# Patient Record
Sex: FEMALE | Race: BLACK OR AFRICAN AMERICAN | Employment: OTHER | URBAN - METROPOLITAN AREA
[De-identification: names, ages, dates, MRNs, and addresses within clinical notes are randomized per-mention and may not be internally consistent; named-entity substitution may affect disease eponyms.]

---

## 2018-04-05 ENCOUNTER — HOSPITAL ENCOUNTER (OUTPATIENT)
Age: 68
Discharge: HOME | End: 2018-04-05
Payer: MEDICARE

## 2018-04-05 LAB
ALBUMIN SERPL-MCNC: 4.5 G/DL (ref 3.5–5.2)
ALP BLD-CCNC: 60 U/L (ref 35–104)
ALT SERPL-CCNC: 31 U/L (ref 0–32)
ANION GAP SERPL CALCULATED.3IONS-SCNC: 15 MMOL/L (ref 7–16)
AST SERPL-CCNC: 24 U/L (ref 0–31)
BILIRUB SERPL-MCNC: 0.7 MG/DL (ref 0–1.2)
BUN BLDV-MCNC: 16 MG/DL (ref 8–23)
CALCIUM SERPL-MCNC: 9.7 MG/DL (ref 8.6–10.2)
CHLORIDE BLD-SCNC: 98 MMOL/L (ref 98–107)
CHOLESTEROL, FASTING: 211 MG/DL (ref 0–199)
CO2: 24 MMOL/L (ref 22–29)
CREAT SERPL-MCNC: 1 MG/DL (ref 0.5–1)
GFR AFRICAN AMERICAN: >60
GFR NON-AFRICAN AMERICAN: >60 ML/MIN/1.73
GLUCOSE FASTING: 82 MG/DL (ref 74–109)
HCT VFR BLD CALC: 41.6 % (ref 34–48)
HDLC SERPL-MCNC: 62 MG/DL
HEMOGLOBIN: 14 G/DL (ref 11.5–15.5)
LDL CHOLESTEROL CALCULATED: 121 MG/DL (ref 0–99)
MCH RBC QN AUTO: 31.2 PG (ref 26–35)
MCHC RBC AUTO-ENTMCNC: 33.7 % (ref 32–34.5)
MCV RBC AUTO: 92.7 FL (ref 80–99.9)
PDW BLD-RTO: 12.1 FL (ref 11.5–15)
PLATELET # BLD: 308 E9/L (ref 130–450)
PMV BLD AUTO: 10.9 FL (ref 7–12)
POTASSIUM SERPL-SCNC: 3.5 MMOL/L (ref 3.5–5)
RBC # BLD: 4.49 E12/L (ref 3.5–5.5)
SODIUM BLD-SCNC: 137 MMOL/L (ref 132–146)
TOTAL PROTEIN: 8.6 G/DL (ref 6.4–8.3)
TRIGLYCERIDE, FASTING: 140 MG/DL (ref 0–149)
TSH SERPL DL<=0.05 MIU/L-ACNC: 1.25 UIU/ML (ref 0.27–4.2)
VITAMIN D 25-HYDROXY: 53 NG/ML (ref 30–100)
VLDLC SERPL CALC-MCNC: 28 MG/DL
WBC # BLD: 8.4 E9/L (ref 4.5–11.5)

## 2018-04-05 PROCEDURE — 80053 COMPREHEN METABOLIC PANEL: CPT

## 2018-04-05 PROCEDURE — 84443 ASSAY THYROID STIM HORMONE: CPT

## 2018-04-05 PROCEDURE — 82306 VITAMIN D 25 HYDROXY: CPT

## 2018-04-05 PROCEDURE — 85027 COMPLETE CBC AUTOMATED: CPT

## 2018-04-05 PROCEDURE — 80061 LIPID PANEL: CPT

## 2018-04-05 PROCEDURE — 36415 COLL VENOUS BLD VENIPUNCTURE: CPT

## 2019-12-20 ENCOUNTER — HOSPITAL ENCOUNTER (OUTPATIENT)
Dept: MAMMOGRAPHY | Age: 69
Discharge: HOME | End: 2019-12-22
Payer: MEDICARE

## 2019-12-20 DIAGNOSIS — Z13.820 ENCOUNTER FOR SCREENING FOR OSTEOPOROSIS: ICD-10-CM

## 2019-12-20 PROCEDURE — 77080 DXA BONE DENSITY AXIAL: CPT

## 2021-12-22 ENCOUNTER — HOSPITAL ENCOUNTER (OUTPATIENT)
Dept: MAMMOGRAPHY | Age: 71
Discharge: HOME | End: 2021-12-24
Payer: MEDICARE

## 2021-12-22 DIAGNOSIS — M81.0 AGE-RELATED OSTEOPOROSIS WITHOUT CURRENT PATHOLOGICAL FRACTURE: ICD-10-CM

## 2021-12-22 PROCEDURE — 77080 DXA BONE DENSITY AXIAL: CPT

## 2022-05-08 ENCOUNTER — HOSPITAL ENCOUNTER (EMERGENCY)
Age: 72
Discharge: HOME | End: 2022-05-08
Attending: EMERGENCY MEDICINE
Payer: COMMERCIAL

## 2022-05-08 ENCOUNTER — APPOINTMENT (OUTPATIENT)
Dept: GENERAL RADIOLOGY | Age: 72
End: 2022-05-08
Payer: COMMERCIAL

## 2022-05-08 VITALS
WEIGHT: 226 LBS | BODY MASS INDEX: 41.59 KG/M2 | HEIGHT: 62 IN | DIASTOLIC BLOOD PRESSURE: 76 MMHG | HEART RATE: 86 BPM | RESPIRATION RATE: 16 BRPM | OXYGEN SATURATION: 99 % | SYSTOLIC BLOOD PRESSURE: 138 MMHG | TEMPERATURE: 97.1 F
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
## 2022-05-08 DIAGNOSIS — S33.5XXA LUMBAR SPRAIN, INITIAL ENCOUNTER: Primary | ICD-10-CM

## 2022-05-08 PROCEDURE — 72100 X-RAY EXAM L-S SPINE 2/3 VWS: CPT

## 2022-05-08 PROCEDURE — 99283 EMERGENCY DEPT VISIT LOW MDM: CPT

## 2022-05-08 RX ORDER — TRIAMTERENE AND HYDROCHLOROTHIAZIDE 37.5; 25 MG/1; MG/1
1 CAPSULE ORAL EVERY MORNING
COMMUNITY

## 2022-05-08 RX ORDER — AMLODIPINE BESYLATE 10 MG/1
10 TABLET ORAL DAILY
COMMUNITY

## 2022-05-08 RX ORDER — CYCLOBENZAPRINE HCL 10 MG
10 TABLET ORAL 3 TIMES DAILY PRN
Qty: 15 TABLET | Refills: 0 | Status: SHIPPED | OUTPATIENT
Start: 2022-05-08 | End: 2022-05-13

## 2022-05-08 RX ORDER — IBUPROFEN 400 MG/1
400 TABLET ORAL EVERY 8 HOURS PRN
Qty: 30 TABLET | Refills: 0 | Status: SHIPPED | OUTPATIENT
Start: 2022-05-08 | End: 2022-07-26

## 2022-05-08 RX ORDER — POTASSIUM CHLORIDE 750 MG/1
10 CAPSULE, EXTENDED RELEASE ORAL 2 TIMES DAILY
COMMUNITY

## 2022-05-08 RX ORDER — PHENOL 1.4 %
1 AEROSOL, SPRAY (ML) MUCOUS MEMBRANE 2 TIMES DAILY PRN
COMMUNITY

## 2022-05-08 ASSESSMENT — ENCOUNTER SYMPTOMS
EYE DISCHARGE: 0
EYE REDNESS: 0
SHORTNESS OF BREATH: 0
VOMITING: 0
ABDOMINAL PAIN: 0
ABDOMINAL DISTENTION: 0
COUGH: 0
EYE PAIN: 0
WHEEZING: 0
BACK PAIN: 0
SORE THROAT: 0
DIARRHEA: 0
BLOOD IN STOOL: 0
SINUS PRESSURE: 0
NAUSEA: 0

## 2022-05-08 ASSESSMENT — PAIN DESCRIPTION - LOCATION: LOCATION: ABDOMEN;BACK

## 2022-05-08 ASSESSMENT — PAIN DESCRIPTION - ORIENTATION: ORIENTATION: LOWER

## 2022-05-08 ASSESSMENT — PAIN DESCRIPTION - PAIN TYPE: TYPE: ACUTE PAIN

## 2022-05-08 ASSESSMENT — PAIN DESCRIPTION - DESCRIPTORS: DESCRIPTORS: SORE

## 2022-05-08 ASSESSMENT — PAIN SCALES - GENERAL: PAINLEVEL_OUTOF10: 7

## 2022-05-08 ASSESSMENT — PAIN DESCRIPTION - FREQUENCY: FREQUENCY: CONTINUOUS

## 2022-05-08 ASSESSMENT — PAIN - FUNCTIONAL ASSESSMENT: PAIN_FUNCTIONAL_ASSESSMENT: 0-10
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
## 2022-07-19 ENCOUNTER — APPOINTMENT (OUTPATIENT)
Dept: GENERAL RADIOLOGY | Age: 72
DRG: 494 | End: 2022-07-19
Payer: MEDICARE

## 2022-07-19 ENCOUNTER — HOSPITAL ENCOUNTER (INPATIENT)
Age: 72
LOS: 1 days | Discharge: NURSING, REHAB OR LONG-TERM CARE | DRG: 494 | End: 2022-07-21
Attending: EMERGENCY MEDICINE | Admitting: INTERNAL MEDICINE
Payer: MEDICARE

## 2022-07-19 DIAGNOSIS — Z01.811 PRE-OP CHEST EXAM: ICD-10-CM

## 2022-07-19 DIAGNOSIS — S82.892A CLOSED FRACTURE OF LEFT ANKLE, INITIAL ENCOUNTER: ICD-10-CM

## 2022-07-19 DIAGNOSIS — S82.842A CLOSED BIMALLEOLAR FRACTURE OF LEFT ANKLE, INITIAL ENCOUNTER: Primary | ICD-10-CM

## 2022-07-19 DIAGNOSIS — S82.842A: ICD-10-CM

## 2022-07-19 PROCEDURE — 2500000003 HC RX 250 WO HCPCS: Performed by: STUDENT IN AN ORGANIZED HEALTH CARE EDUCATION/TRAINING PROGRAM

## 2022-07-19 PROCEDURE — 6360000002 HC RX W HCPCS

## 2022-07-19 PROCEDURE — 99285 EMERGENCY DEPT VISIT HI MDM: CPT

## 2022-07-19 PROCEDURE — 73600 X-RAY EXAM OF ANKLE: CPT

## 2022-07-19 PROCEDURE — 6360000002 HC RX W HCPCS: Performed by: EMERGENCY MEDICINE

## 2022-07-19 PROCEDURE — 73590 X-RAY EXAM OF LOWER LEG: CPT

## 2022-07-19 PROCEDURE — 6370000000 HC RX 637 (ALT 250 FOR IP): Performed by: STUDENT IN AN ORGANIZED HEALTH CARE EDUCATION/TRAINING PROGRAM

## 2022-07-19 PROCEDURE — 2500000003 HC RX 250 WO HCPCS

## 2022-07-19 PROCEDURE — 73610 X-RAY EXAM OF ANKLE: CPT

## 2022-07-19 PROCEDURE — 96375 TX/PRO/DX INJ NEW DRUG ADDON: CPT

## 2022-07-19 PROCEDURE — 96374 THER/PROPH/DIAG INJ IV PUSH: CPT

## 2022-07-19 PROCEDURE — 73630 X-RAY EXAM OF FOOT: CPT

## 2022-07-19 PROCEDURE — 96376 TX/PRO/DX INJ SAME DRUG ADON: CPT

## 2022-07-19 PROCEDURE — 6360000002 HC RX W HCPCS: Performed by: STUDENT IN AN ORGANIZED HEALTH CARE EDUCATION/TRAINING PROGRAM

## 2022-07-19 RX ORDER — ONDANSETRON 2 MG/ML
4 INJECTION INTRAMUSCULAR; INTRAVENOUS EVERY 6 HOURS PRN
Status: DISCONTINUED | OUTPATIENT
Start: 2022-07-19 | End: 2022-07-22 | Stop reason: HOSPADM

## 2022-07-19 RX ORDER — LIDOCAINE HYDROCHLORIDE AND EPINEPHRINE 10; 10 MG/ML; UG/ML
20 INJECTION, SOLUTION INFILTRATION; PERINEURAL ONCE
Status: COMPLETED | OUTPATIENT
Start: 2022-07-19 | End: 2022-07-19

## 2022-07-19 RX ORDER — PROPOFOL 10 MG/ML
INJECTION, EMULSION INTRAVENOUS
Status: COMPLETED
Start: 2022-07-19 | End: 2022-07-19

## 2022-07-19 RX ORDER — FENTANYL CITRATE 0.05 MG/ML
50 INJECTION, SOLUTION INTRAMUSCULAR; INTRAVENOUS ONCE
Status: COMPLETED | OUTPATIENT
Start: 2022-07-19 | End: 2022-07-19

## 2022-07-19 RX ORDER — KETAMINE HYDROCHLORIDE 10 MG/ML
INJECTION, SOLUTION INTRAMUSCULAR; INTRAVENOUS
Status: COMPLETED
Start: 2022-07-19 | End: 2022-07-19

## 2022-07-19 RX ORDER — ACETAMINOPHEN 500 MG
1000 TABLET ORAL ONCE
Status: COMPLETED | OUTPATIENT
Start: 2022-07-19 | End: 2022-07-19

## 2022-07-19 RX ORDER — PROPOFOL 10 MG/ML
0.5 INJECTION, EMULSION INTRAVENOUS ONCE
Status: COMPLETED | OUTPATIENT
Start: 2022-07-19 | End: 2022-07-19

## 2022-07-19 RX ORDER — KETAMINE HYDROCHLORIDE 10 MG/ML
0.5 INJECTION, SOLUTION INTRAMUSCULAR; INTRAVENOUS ONCE
Status: COMPLETED | OUTPATIENT
Start: 2022-07-19 | End: 2022-07-19

## 2022-07-19 RX ADMIN — ONDANSETRON 4 MG: 2 INJECTION INTRAMUSCULAR; INTRAVENOUS at 19:00

## 2022-07-19 RX ADMIN — PROPOFOL 51 MG: 10 INJECTION, EMULSION INTRAVENOUS at 23:31

## 2022-07-19 RX ADMIN — ONDANSETRON 4 MG: 2 INJECTION INTRAMUSCULAR; INTRAVENOUS at 20:15

## 2022-07-19 RX ADMIN — FENTANYL CITRATE 50 MCG: 50 INJECTION INTRAMUSCULAR; INTRAVENOUS at 20:05

## 2022-07-19 RX ADMIN — LIDOCAINE HYDROCHLORIDE,EPINEPHRINE BITARTRATE 20 ML: 10; .01 INJECTION, SOLUTION INFILTRATION; PERINEURAL at 23:20

## 2022-07-19 RX ADMIN — KETAMINE HYDROCHLORIDE 51.1 MG: 10 INJECTION, SOLUTION INTRAMUSCULAR; INTRAVENOUS at 23:31

## 2022-07-19 RX ADMIN — ACETAMINOPHEN 1000 MG: 500 TABLET ORAL at 22:13

## 2022-07-19 ASSESSMENT — PAIN SCALES - GENERAL
PAINLEVEL_OUTOF10: 10
PAINLEVEL_OUTOF10: 10
PAINLEVEL_OUTOF10: 0

## 2022-07-19 ASSESSMENT — PAIN - FUNCTIONAL ASSESSMENT
PAIN_FUNCTIONAL_ASSESSMENT: NONE - DENIES PAIN

## 2022-07-19 NOTE — ED PROVIDER NOTES
79-year-old female presenting for leg injury. Injury happened prior to arrival.  Patient was on the porch when she slipped and fell onto her left leg. She not hit her head or pass out. She does not lose consciousness and is not on any blood thinners. She complains of pain in her left ankle. She denies any other injuries. Of note, patient was given Ketamine by EMS and had nausea and emesis subsequently. Review of Systems   Respiratory:  Negative for cough. Cardiovascular:  Negative for chest pain. Gastrointestinal:  Positive for nausea and vomiting. Negative for diarrhea. Musculoskeletal:  Positive for arthralgias. Negative for back pain and neck pain. Left ankle pain   Skin:  Negative for wound. Neurological:  Negative for dizziness, syncope, light-headedness, numbness and headaches. All other systems reviewed and are negative. Physical Exam  Constitutional:       General: She is not in acute distress. Appearance: She is not ill-appearing. HENT:      Head: Normocephalic and atraumatic. Right Ear: External ear normal.      Left Ear: External ear normal.      Nose: Nose normal.   Eyes:      Conjunctiva/sclera: Conjunctivae normal.   Cardiovascular:      Rate and Rhythm: Normal rate and regular rhythm. Comments: DP pulse strong LLE; PT pulse detectable LLE  Pulmonary:      Effort: Pulmonary effort is normal.      Breath sounds: Normal breath sounds. Abdominal:      General: There is no distension. Palpations: Abdomen is soft. Tenderness: There is no abdominal tenderness. Musculoskeletal:      Comments: Obvious deformity left ankle, no wound or overlying skin changes, tender to palpation, ROM limited   Lymphadenopathy:      Cervical: No cervical adenopathy. Skin:     General: Skin is warm and dry. Neurological:      General: No focal deficit present. Mental Status: She is alert.       Comments: Sensation to touch intact LLE in all nerve distributions   Psychiatric:         Mood and Affect: Mood normal.         Behavior: Behavior normal.        Procedures   Conscious Sedation Procedure Note    Indication: ankle dislocation    Consent: I have discussed with the patient and/or the patient representative the indication, alternatives, and the possible risks and/or complications of the planned procedure and the anesthesia methods. The patient and/or patient representative appear to understand and agree to proceed. Physician Involvement: The attending physician was present and supervising this procedure. Pre-Sedation Documentation and Exam:  Time: 0265  I have personally completed a history, physical exam & review of systems for this patient (see notes). Vital signs have been reviewed (see flow sheet for vitals). I have reviewed the patient's history and review of systems. Lungs: clear to auscultation bilaterally, Cardiovascular: regular rate and rhythm. Airway Assessment: Mallampati Class I - (soft palate, fauces, uvula & anterior/posterior tonsillar pillars are visible)    Prior History of Anesthesia Complications: vomiting with anesthesia administration    ASA Classification: Class 2 - A normal healthy patient with mild systemic disease    Sedation/ Anesthesia Plan: intravenous sedation    Medications Used: ketamine intravenously and propofol intravenously    Monitoring and Safety: The patient was placed on a cardiac monitor and vital signs, pulse oximetry and level of consciousness were continuously evaluated throughout the procedure. The patient was closely monitored until recovery from the medications was complete and the patient had returned to baseline status. Respiratory therapy was on standby at all times during the procedure. (The following sections must be completed)  Post-Sedation Vital Signs: Vital signs were reviewed and were stable after the procedure (see flow sheet for vitals)            Post-Sedation Exam:   Time: 4081. Lungs: clear to auscultation bilaterally and Cardiovascular: regular rate and rhythm           Complications: none        MDM  Number of Diagnoses or Management Options  Closed bimalleolar fracture of left ankle, initial encounter  Diagnosis management comments: 71-year-old female presenting for left ankle pain. X-ray showed bimalleolar fracture with subluxation. Orthopedic surgery was consulted. Patient underwent conscious sedation with closed reduction of ankle joint in the ED. patient neurovascular intact before and after procedure. Patient to be admitted with plans for surgery tomorrow. ED Course as of 07/20/22 0031 Tue Jul 19, 2022 2337 I Supervised the procedural sedation by the resident which was done well with no complications. [GJ]      ED Course User Index  [GJ] Odalis Cotter MD           ED Course as of 07/20/22 0031 Tue Jul 19, 2022 2337 I Supervised the procedural sedation by the resident which was done well with no complications. [GJ]      ED Course User Index  [GJ] Odalis Cotter MD       --------------------------------------------- PAST HISTORY ---------------------------------------------  Past Medical History:  has a past medical history of Hypertension. Past Surgical History:  has a past surgical history that includes Hysterectomy. Social History:  reports that she has never smoked. She has never used smokeless tobacco. She reports that she does not currently use alcohol. She reports that she does not use drugs. Family History: family history is not on file. The patients home medications have been reviewed. Allergies: Codeine    -------------------------------------------------- RESULTS -------------------------------------------------    LABS:  No results found for this visit on 07/19/22. RADIOLOGY:  XR TIBIA FIBULA LEFT (2 VIEWS)   Final Result   1. Fractures of the distal tibia and fibula with lateral subluxation of the   ankle joint.    2. The remainder of the tibia and fibula as well as left foot. 3. Suboptimal visualization of the ankle joint due to deformity. Attention   recommended the the on post reduction radiographs. XR ANKLE LEFT (2 VIEWS)   Final Result   1. Fractures of the distal tibia and fibula with lateral subluxation of the   ankle joint. 2. The remainder of the tibia and fibula as well as left foot. 3. Suboptimal visualization of the ankle joint due to deformity. Attention   recommended the the on post reduction radiographs. XR FOOT LEFT (MIN 3 VIEWS)   Final Result   1. Fractures of the distal tibia and fibula with lateral subluxation of the   ankle joint. 2. The remainder of the tibia and fibula as well as left foot. 3. Suboptimal visualization of the ankle joint due to deformity. Attention   recommended the the on post reduction radiographs. XR ANKLE LEFT (MIN 3 VIEWS)    (Results Pending)       ------------------------- NURSING NOTES AND VITALS REVIEWED ---------------------------  Date / Time Roomed:  7/19/2022  6:51 PM  ED Bed Assignment:  08/08    The nursing notes within the ED encounter and vital signs as below have been reviewed.      Patient Vitals for the past 24 hrs:   BP Temp Temp src Pulse Resp SpO2 Height Weight   07/19/22 2348 (!) 172/69 -- -- (!) 123 20 96 % -- --   07/19/22 2344 (!) 175/82 -- -- (!) 123 18 96 % -- --   07/19/22 2340 (!) 180/80 -- -- -- 24 96 % -- --   07/19/22 2336 (!) 176/84 -- -- (!) 133 26 95 % -- --   07/19/22 2329 (!) 173/76 -- -- (!) 126 20 98 % -- --   07/19/22 2215 (!) 143/78 -- -- 89 18 94 % -- --   07/19/22 1858 (!) 167/79 98.4 °F (36.9 °C) Oral (!) 113 20 93 % 5' 4\" (1.626 m) 225 lb (102.1 kg)       Oxygen Saturation Interpretation: Normal    ------------------------------------------ PROGRESS NOTES ------------------------------------------    Counseling:  I have spoken with the patient and discussed todays results, in addition to providing specific details for the plan of care and counseling regarding the diagnosis and prognosis. Their questions are answered at this time and they are agreeable with the plan of admission.    --------------------------------- ADDITIONAL PROVIDER NOTES ---------------------------------    This patient's ED course included: a personal history and physicial examination, re-evaluation prior to disposition, multiple bedside re-evaluations, IV medications, cardiac monitoring, and continuous pulse oximetry    This patient has remained hemodynamically stable during their ED course. Diagnosis:  1. Closed bimalleolar fracture of left ankle, initial encounter        Disposition:  Patient's disposition: Admit to med/surg floor  Patient's condition is stable.          Lottie Ding MD  Resident  07/20/22 7975       Lottie Ding MD  Resident  07/20/22 4753       Lottie Ding MD  Resident  07/20/22 2483

## 2022-07-19 NOTE — LETTER
[place] [employer] Medicaid  CERTIFICATION OF NECESSITY  FOR NON-EMERGENCY TRANSPORTATION   BY [facility]      Individual Information   1. Name: Emilia Merida 2. [place] Medicaid Billing Number: NA   3. Address: [address] [place] [address],[address]  [place] [address]      Transportation Provider Information   4. Provider Name: [facility]   5. [place] Medicaid Provider Number: NA National Provider Identifier (NPI):      Certification  7. Criteria:  During transport, this individual requires:  [x] Medical treatment or continuous     supervision by an EMT. [] The administration or regulation of oxygen by another person. [] Supervised protective restraint. 8. Period Beginning Date: 7/21/2022     9. Length  [x] Not more than 1 day(s)  [] One Year     Additional Information Relevant to Certification   10. Comments or Explanations, If Necessary or Appropriate Dx- Tibia/Fibula fracture, closed bimalleolar fracture of left ankle, placement of external fixator post-op on 7/20, pt non weight bearing, non ambulatory, mod assist of 2 and supervision with transfers and for safety in route. Certifying Practitioner Information   11. Name of Practitioner: Dr Glendy Souza   12. [place] Medicaid Provider Number, If Applicable: NA 13. National Provider Identifier (NPI):      Signature Information   14. Date of Signature: 7/21/2022   15. Name of Person Signing: MINAL Temple   Electronically signed by MINAL Temple on 7/21/2022 at 2:16 PM     16.  Signature and Professional Designation: ***     [facility] [id number]  Rev. 7/2015

## 2022-07-20 ENCOUNTER — APPOINTMENT (OUTPATIENT)
Dept: GENERAL RADIOLOGY | Age: 72
DRG: 494 | End: 2022-07-20
Payer: MEDICARE

## 2022-07-20 ENCOUNTER — ANESTHESIA (OUTPATIENT)
Dept: OPERATING ROOM | Age: 72
DRG: 494 | End: 2022-07-20
Payer: MEDICARE

## 2022-07-20 ENCOUNTER — APPOINTMENT (OUTPATIENT)
Dept: CT IMAGING | Age: 72
DRG: 494 | End: 2022-07-20
Payer: MEDICARE

## 2022-07-20 ENCOUNTER — ANESTHESIA EVENT (OUTPATIENT)
Dept: OPERATING ROOM | Age: 72
DRG: 494 | End: 2022-07-20
Payer: MEDICARE

## 2022-07-20 PROBLEM — S82.209A TIBIA/FIBULA FRACTURE: Status: ACTIVE | Noted: 2022-07-20

## 2022-07-20 PROBLEM — S82.842A: Status: ACTIVE | Noted: 2022-07-20

## 2022-07-20 PROBLEM — S82.409A TIBIA/FIBULA FRACTURE: Status: ACTIVE | Noted: 2022-07-20

## 2022-07-20 LAB
ABO/RH: NORMAL
ABO/RH: NORMAL
ANION GAP SERPL CALCULATED.3IONS-SCNC: 13 MMOL/L (ref 7–16)
ANTIBODY SCREEN: NORMAL
APTT: 29.7 SEC (ref 24.5–35.1)
BASOPHILS ABSOLUTE: 0.03 E9/L (ref 0–0.2)
BASOPHILS RELATIVE PERCENT: 0.2 % (ref 0–2)
BUN BLDV-MCNC: 14 MG/DL (ref 6–23)
CALCIUM SERPL-MCNC: 9.3 MG/DL (ref 8.6–10.2)
CHLORIDE BLD-SCNC: 106 MMOL/L (ref 98–107)
CO2: 26 MMOL/L (ref 22–29)
CREAT SERPL-MCNC: 1 MG/DL (ref 0.5–1)
EOSINOPHILS ABSOLUTE: 0.01 E9/L (ref 0.05–0.5)
EOSINOPHILS RELATIVE PERCENT: 0.1 % (ref 0–6)
GFR AFRICAN AMERICAN: >60
GFR NON-AFRICAN AMERICAN: >60 ML/MIN/1.73
GLUCOSE BLD-MCNC: 124 MG/DL (ref 74–99)
HCT VFR BLD CALC: 39.6 % (ref 34–48)
HEMOGLOBIN: 12.7 G/DL (ref 11.5–15.5)
IMMATURE GRANULOCYTES #: 0.05 E9/L
IMMATURE GRANULOCYTES %: 0.3 % (ref 0–5)
INR BLD: 1
LYMPHOCYTES ABSOLUTE: 1.65 E9/L (ref 1.5–4)
LYMPHOCYTES RELATIVE PERCENT: 11.5 % (ref 20–42)
MCH RBC QN AUTO: 31.8 PG (ref 26–35)
MCHC RBC AUTO-ENTMCNC: 32.1 % (ref 32–34.5)
MCV RBC AUTO: 99.2 FL (ref 80–99.9)
MONOCYTES ABSOLUTE: 1.03 E9/L (ref 0.1–0.95)
MONOCYTES RELATIVE PERCENT: 7.2 % (ref 2–12)
NEUTROPHILS ABSOLUTE: 11.54 E9/L (ref 1.8–7.3)
NEUTROPHILS RELATIVE PERCENT: 80.7 % (ref 43–80)
PDW BLD-RTO: 12.2 FL (ref 11.5–15)
PLATELET # BLD: 310 E9/L (ref 130–450)
PMV BLD AUTO: 10.7 FL (ref 7–12)
POTASSIUM SERPL-SCNC: 3.1 MMOL/L (ref 3.5–5)
PROTHROMBIN TIME: 12 SEC (ref 9.3–12.4)
RBC # BLD: 3.99 E12/L (ref 3.5–5.5)
SODIUM BLD-SCNC: 145 MMOL/L (ref 132–146)
WBC # BLD: 14.3 E9/L (ref 4.5–11.5)

## 2022-07-20 PROCEDURE — 2580000003 HC RX 258

## 2022-07-20 PROCEDURE — 73700 CT LOWER EXTREMITY W/O DYE: CPT

## 2022-07-20 PROCEDURE — 6370000000 HC RX 637 (ALT 250 FOR IP): Performed by: FAMILY MEDICINE

## 2022-07-20 PROCEDURE — 27818 TREATMENT OF ANKLE FRACTURE: CPT | Performed by: ORTHOPAEDIC SURGERY

## 2022-07-20 PROCEDURE — 2580000003 HC RX 258: Performed by: FAMILY MEDICINE

## 2022-07-20 PROCEDURE — 3700000001 HC ADD 15 MINUTES (ANESTHESIA): Performed by: ORTHOPAEDIC SURGERY

## 2022-07-20 PROCEDURE — 85730 THROMBOPLASTIN TIME PARTIAL: CPT

## 2022-07-20 PROCEDURE — 3600000002 HC SURGERY LEVEL 2 BASE: Performed by: ORTHOPAEDIC SURGERY

## 2022-07-20 PROCEDURE — 6360000002 HC RX W HCPCS: Performed by: FAMILY MEDICINE

## 2022-07-20 PROCEDURE — 36415 COLL VENOUS BLD VENIPUNCTURE: CPT

## 2022-07-20 PROCEDURE — 86900 BLOOD TYPING SEROLOGIC ABO: CPT

## 2022-07-20 PROCEDURE — 6370000000 HC RX 637 (ALT 250 FOR IP): Performed by: INTERNAL MEDICINE

## 2022-07-20 PROCEDURE — 6360000002 HC RX W HCPCS: Performed by: NURSE ANESTHETIST, CERTIFIED REGISTERED

## 2022-07-20 PROCEDURE — 7100000001 HC PACU RECOVERY - ADDTL 15 MIN: Performed by: ORTHOPAEDIC SURGERY

## 2022-07-20 PROCEDURE — C1713 ANCHOR/SCREW BN/BN,TIS/BN: HCPCS | Performed by: ORTHOPAEDIC SURGERY

## 2022-07-20 PROCEDURE — 6360000002 HC RX W HCPCS

## 2022-07-20 PROCEDURE — 0SSG34Z REPOSITION LEFT ANKLE JOINT WITH INTERNAL FIXATION DEVICE, PERCUTANEOUS APPROACH: ICD-10-PCS | Performed by: ORTHOPAEDIC SURGERY

## 2022-07-20 PROCEDURE — 99223 1ST HOSP IP/OBS HIGH 75: CPT | Performed by: ORTHOPAEDIC SURGERY

## 2022-07-20 PROCEDURE — 85610 PROTHROMBIN TIME: CPT

## 2022-07-20 PROCEDURE — 0QHH34Z INSERTION OF INTERNAL FIXATION DEVICE INTO LEFT TIBIA, PERCUTANEOUS APPROACH: ICD-10-PCS | Performed by: ORTHOPAEDIC SURGERY

## 2022-07-20 PROCEDURE — 86901 BLOOD TYPING SEROLOGIC RH(D): CPT

## 2022-07-20 PROCEDURE — 20690 APPL UNIPLN UNI EXT FIXJ SYS: CPT | Performed by: ORTHOPAEDIC SURGERY

## 2022-07-20 PROCEDURE — 85025 COMPLETE CBC W/AUTO DIFF WBC: CPT

## 2022-07-20 PROCEDURE — 93005 ELECTROCARDIOGRAM TRACING: CPT | Performed by: STUDENT IN AN ORGANIZED HEALTH CARE EDUCATION/TRAINING PROGRAM

## 2022-07-20 PROCEDURE — 7100000000 HC PACU RECOVERY - FIRST 15 MIN: Performed by: ORTHOPAEDIC SURGERY

## 2022-07-20 PROCEDURE — 2720000010 HC SURG SUPPLY STERILE: Performed by: ORTHOPAEDIC SURGERY

## 2022-07-20 PROCEDURE — 3209999900 FLUORO FOR SURGICAL PROCEDURES

## 2022-07-20 PROCEDURE — 6360000002 HC RX W HCPCS: Performed by: ANESTHESIOLOGY

## 2022-07-20 PROCEDURE — 2709999900 HC NON-CHARGEABLE SUPPLY: Performed by: ORTHOPAEDIC SURGERY

## 2022-07-20 PROCEDURE — 80048 BASIC METABOLIC PNL TOTAL CA: CPT

## 2022-07-20 PROCEDURE — 86850 RBC ANTIBODY SCREEN: CPT

## 2022-07-20 PROCEDURE — 3600000012 HC SURGERY LEVEL 2 ADDTL 15MIN: Performed by: ORTHOPAEDIC SURGERY

## 2022-07-20 PROCEDURE — 6360000002 HC RX W HCPCS: Performed by: EMERGENCY MEDICINE

## 2022-07-20 PROCEDURE — 71045 X-RAY EXAM CHEST 1 VIEW: CPT

## 2022-07-20 PROCEDURE — 3700000000 HC ANESTHESIA ATTENDED CARE: Performed by: ORTHOPAEDIC SURGERY

## 2022-07-20 PROCEDURE — 51702 INSERT TEMP BLADDER CATH: CPT

## 2022-07-20 PROCEDURE — 1200000000 HC SEMI PRIVATE

## 2022-07-20 PROCEDURE — 2580000003 HC RX 258: Performed by: NURSE ANESTHETIST, CERTIFIED REGISTERED

## 2022-07-20 PROCEDURE — 27842 TREAT ANKLE DISLOCATION: CPT | Performed by: ORTHOPAEDIC SURGERY

## 2022-07-20 RX ORDER — CEFAZOLIN 2 G/1
INJECTION, POWDER, FOR SOLUTION INTRAMUSCULAR; INTRAVENOUS
Status: DISCONTINUED
Start: 2022-07-20 | End: 2022-07-20 | Stop reason: WASHOUT

## 2022-07-20 RX ORDER — MORPHINE SULFATE 2 MG/ML
2 INJECTION, SOLUTION INTRAMUSCULAR; INTRAVENOUS EVERY 4 HOURS PRN
Status: DISCONTINUED | OUTPATIENT
Start: 2022-07-20 | End: 2022-07-21

## 2022-07-20 RX ORDER — ONDANSETRON 2 MG/ML
INJECTION INTRAMUSCULAR; INTRAVENOUS
Status: COMPLETED
Start: 2022-07-20 | End: 2022-07-20

## 2022-07-20 RX ORDER — DEXAMETHASONE SODIUM PHOSPHATE 10 MG/ML
INJECTION, SOLUTION INTRAMUSCULAR; INTRAVENOUS PRN
Status: DISCONTINUED | OUTPATIENT
Start: 2022-07-20 | End: 2022-07-20 | Stop reason: SDUPTHER

## 2022-07-20 RX ORDER — OXYCODONE HYDROCHLORIDE 5 MG/1
10 TABLET ORAL EVERY 4 HOURS PRN
Status: DISCONTINUED | OUTPATIENT
Start: 2022-07-20 | End: 2022-07-21

## 2022-07-20 RX ORDER — MEPERIDINE HYDROCHLORIDE 25 MG/ML
12.5 INJECTION INTRAMUSCULAR; INTRAVENOUS; SUBCUTANEOUS EVERY 5 MIN PRN
Status: COMPLETED | OUTPATIENT
Start: 2022-07-20 | End: 2022-07-20

## 2022-07-20 RX ORDER — SODIUM CHLORIDE 9 MG/ML
INJECTION, SOLUTION INTRAVENOUS PRN
Status: DISCONTINUED | OUTPATIENT
Start: 2022-07-20 | End: 2022-07-22 | Stop reason: HOSPADM

## 2022-07-20 RX ORDER — PHENOL 1.4 %
1 AEROSOL, SPRAY (ML) MUCOUS MEMBRANE 2 TIMES DAILY PRN
Status: DISCONTINUED | OUTPATIENT
Start: 2022-07-20 | End: 2022-07-20 | Stop reason: CLARIF

## 2022-07-20 RX ORDER — FENTANYL CITRATE 0.05 MG/ML
50 INJECTION, SOLUTION INTRAMUSCULAR; INTRAVENOUS ONCE
Status: DISCONTINUED | OUTPATIENT
Start: 2022-07-20 | End: 2022-07-22 | Stop reason: HOSPADM

## 2022-07-20 RX ORDER — LABETALOL HYDROCHLORIDE 5 MG/ML
10 INJECTION, SOLUTION INTRAVENOUS
Status: DISCONTINUED | OUTPATIENT
Start: 2022-07-20 | End: 2022-07-20 | Stop reason: HOSPADM

## 2022-07-20 RX ORDER — OXYCODONE HYDROCHLORIDE AND ACETAMINOPHEN 5; 325 MG/1; MG/1
1 TABLET ORAL EVERY 6 HOURS PRN
Qty: 28 TABLET | Refills: 0 | Status: SHIPPED | OUTPATIENT
Start: 2022-07-20 | End: 2022-07-21 | Stop reason: HOSPADM

## 2022-07-20 RX ORDER — IPRATROPIUM BROMIDE AND ALBUTEROL SULFATE 2.5; .5 MG/3ML; MG/3ML
1 SOLUTION RESPIRATORY (INHALATION)
Status: DISCONTINUED | OUTPATIENT
Start: 2022-07-20 | End: 2022-07-20 | Stop reason: HOSPADM

## 2022-07-20 RX ORDER — SODIUM CHLORIDE 0.9 % (FLUSH) 0.9 %
5-40 SYRINGE (ML) INJECTION PRN
Status: DISCONTINUED | OUTPATIENT
Start: 2022-07-20 | End: 2022-07-22 | Stop reason: HOSPADM

## 2022-07-20 RX ORDER — SODIUM CHLORIDE 0.9 % (FLUSH) 0.9 %
5-40 SYRINGE (ML) INJECTION EVERY 12 HOURS SCHEDULED
Status: DISCONTINUED | OUTPATIENT
Start: 2022-07-20 | End: 2022-07-22 | Stop reason: HOSPADM

## 2022-07-20 RX ORDER — LORAZEPAM 2 MG/ML
0.5 INJECTION INTRAMUSCULAR
Status: DISCONTINUED | OUTPATIENT
Start: 2022-07-20 | End: 2022-07-20 | Stop reason: HOSPADM

## 2022-07-20 RX ORDER — OXYCODONE HYDROCHLORIDE 5 MG/1
5 TABLET ORAL EVERY 4 HOURS PRN
Status: DISCONTINUED | OUTPATIENT
Start: 2022-07-20 | End: 2022-07-21

## 2022-07-20 RX ORDER — PROPOFOL 10 MG/ML
INJECTION, EMULSION INTRAVENOUS PRN
Status: DISCONTINUED | OUTPATIENT
Start: 2022-07-20 | End: 2022-07-20 | Stop reason: SDUPTHER

## 2022-07-20 RX ORDER — SODIUM CHLORIDE, SODIUM LACTATE, POTASSIUM CHLORIDE, CALCIUM CHLORIDE 600; 310; 30; 20 MG/100ML; MG/100ML; MG/100ML; MG/100ML
INJECTION, SOLUTION INTRAVENOUS CONTINUOUS PRN
Status: DISCONTINUED | OUTPATIENT
Start: 2022-07-20 | End: 2022-07-20 | Stop reason: SDUPTHER

## 2022-07-20 RX ORDER — TRIAMTERENE AND HYDROCHLOROTHIAZIDE 37.5; 25 MG/1; MG/1
1 CAPSULE ORAL EVERY MORNING
Status: DISCONTINUED | OUTPATIENT
Start: 2022-07-20 | End: 2022-07-20 | Stop reason: CLARIF

## 2022-07-20 RX ORDER — PROCHLORPERAZINE EDISYLATE 5 MG/ML
5 INJECTION INTRAMUSCULAR; INTRAVENOUS
Status: COMPLETED | OUTPATIENT
Start: 2022-07-20 | End: 2022-07-20

## 2022-07-20 RX ORDER — PROCHLORPERAZINE EDISYLATE 5 MG/ML
INJECTION INTRAMUSCULAR; INTRAVENOUS
Status: COMPLETED
Start: 2022-07-20 | End: 2022-07-20

## 2022-07-20 RX ORDER — AMLODIPINE BESYLATE 10 MG/1
10 TABLET ORAL DAILY
Status: DISCONTINUED | OUTPATIENT
Start: 2022-07-20 | End: 2022-07-22 | Stop reason: HOSPADM

## 2022-07-20 RX ORDER — ONDANSETRON 2 MG/ML
INJECTION INTRAMUSCULAR; INTRAVENOUS PRN
Status: DISCONTINUED | OUTPATIENT
Start: 2022-07-20 | End: 2022-07-20 | Stop reason: SDUPTHER

## 2022-07-20 RX ORDER — ONDANSETRON 2 MG/ML
4 INJECTION INTRAMUSCULAR; INTRAVENOUS
Status: COMPLETED | OUTPATIENT
Start: 2022-07-20 | End: 2022-07-20

## 2022-07-20 RX ORDER — DIPHENHYDRAMINE HYDROCHLORIDE 50 MG/ML
12.5 INJECTION INTRAMUSCULAR; INTRAVENOUS
Status: DISCONTINUED | OUTPATIENT
Start: 2022-07-20 | End: 2022-07-20 | Stop reason: HOSPADM

## 2022-07-20 RX ORDER — HYDRALAZINE HYDROCHLORIDE 20 MG/ML
10 INJECTION INTRAMUSCULAR; INTRAVENOUS
Status: DISCONTINUED | OUTPATIENT
Start: 2022-07-20 | End: 2022-07-20 | Stop reason: HOSPADM

## 2022-07-20 RX ORDER — ACETAMINOPHEN 325 MG/1
650 TABLET ORAL EVERY 6 HOURS PRN
Status: DISCONTINUED | OUTPATIENT
Start: 2022-07-20 | End: 2022-07-22 | Stop reason: HOSPADM

## 2022-07-20 RX ORDER — MEPERIDINE HYDROCHLORIDE 25 MG/ML
INJECTION INTRAMUSCULAR; INTRAVENOUS; SUBCUTANEOUS
Status: COMPLETED
Start: 2022-07-20 | End: 2022-07-20

## 2022-07-20 RX ORDER — MORPHINE SULFATE 2 MG/ML
2 INJECTION, SOLUTION INTRAMUSCULAR; INTRAVENOUS EVERY 5 MIN PRN
Status: DISCONTINUED | OUTPATIENT
Start: 2022-07-20 | End: 2022-07-20 | Stop reason: HOSPADM

## 2022-07-20 RX ORDER — TRIAMTERENE AND HYDROCHLOROTHIAZIDE 37.5; 25 MG/1; MG/1
1 TABLET ORAL DAILY
Status: DISCONTINUED | OUTPATIENT
Start: 2022-07-20 | End: 2022-07-22 | Stop reason: HOSPADM

## 2022-07-20 RX ORDER — ASPIRIN 325 MG
325 TABLET, DELAYED RELEASE (ENTERIC COATED) ORAL 2 TIMES DAILY
Qty: 56 TABLET | Refills: 0 | Status: SHIPPED | OUTPATIENT
Start: 2022-07-20 | End: 2022-08-17

## 2022-07-20 RX ORDER — CALCIUM CARBONATE 200(500)MG
500 TABLET,CHEWABLE ORAL 2 TIMES DAILY PRN
Status: DISCONTINUED | OUTPATIENT
Start: 2022-07-20 | End: 2022-07-22 | Stop reason: HOSPADM

## 2022-07-20 RX ORDER — FENTANYL CITRATE 50 UG/ML
INJECTION, SOLUTION INTRAMUSCULAR; INTRAVENOUS PRN
Status: DISCONTINUED | OUTPATIENT
Start: 2022-07-20 | End: 2022-07-20 | Stop reason: SDUPTHER

## 2022-07-20 RX ORDER — KETOROLAC TROMETHAMINE 30 MG/ML
INJECTION, SOLUTION INTRAMUSCULAR; INTRAVENOUS PRN
Status: DISCONTINUED | OUTPATIENT
Start: 2022-07-20 | End: 2022-07-20 | Stop reason: SDUPTHER

## 2022-07-20 RX ADMIN — MEPERIDINE HYDROCHLORIDE 12.5 MG: 25 INJECTION, SOLUTION INTRAMUSCULAR; INTRAVENOUS; SUBCUTANEOUS at 17:42

## 2022-07-20 RX ADMIN — AMLODIPINE BESYLATE 10 MG: 10 TABLET ORAL at 11:05

## 2022-07-20 RX ADMIN — ONDANSETRON 4 MG: 2 INJECTION INTRAMUSCULAR; INTRAVENOUS at 20:38

## 2022-07-20 RX ADMIN — SODIUM CHLORIDE, POTASSIUM CHLORIDE, SODIUM LACTATE AND CALCIUM CHLORIDE: 600; 310; 30; 20 INJECTION, SOLUTION INTRAVENOUS at 15:44

## 2022-07-20 RX ADMIN — FENTANYL CITRATE 100 MCG: 50 INJECTION, SOLUTION INTRAMUSCULAR; INTRAVENOUS at 15:40

## 2022-07-20 RX ADMIN — FENTANYL CITRATE 100 MCG: 50 INJECTION, SOLUTION INTRAMUSCULAR; INTRAVENOUS at 15:46

## 2022-07-20 RX ADMIN — KETOROLAC TROMETHAMINE 30 MG: 30 INJECTION, SOLUTION INTRAMUSCULAR at 16:39

## 2022-07-20 RX ADMIN — PROCHLORPERAZINE EDISYLATE 5 MG: 5 INJECTION INTRAMUSCULAR; INTRAVENOUS at 17:29

## 2022-07-20 RX ADMIN — PROPOFOL 200 MG: 10 INJECTION, EMULSION INTRAVENOUS at 15:45

## 2022-07-20 RX ADMIN — CEFAZOLIN 2000 MG: 2 INJECTION, POWDER, FOR SOLUTION INTRAMUSCULAR; INTRAVENOUS at 20:35

## 2022-07-20 RX ADMIN — MEPERIDINE HYDROCHLORIDE 12.5 MG: 25 INJECTION, SOLUTION INTRAMUSCULAR; INTRAVENOUS; SUBCUTANEOUS at 17:34

## 2022-07-20 RX ADMIN — OXYCODONE 10 MG: 5 TABLET ORAL at 20:37

## 2022-07-20 RX ADMIN — SODIUM CHLORIDE, POTASSIUM CHLORIDE, SODIUM LACTATE AND CALCIUM CHLORIDE: 600; 310; 30; 20 INJECTION, SOLUTION INTRAVENOUS at 16:39

## 2022-07-20 RX ADMIN — ONDANSETRON 4 MG: 2 INJECTION INTRAMUSCULAR; INTRAVENOUS at 15:59

## 2022-07-20 RX ADMIN — ACETAMINOPHEN 650 MG: 325 TABLET ORAL at 08:52

## 2022-07-20 RX ADMIN — CEFAZOLIN 2000 MG: 2 INJECTION, POWDER, FOR SOLUTION INTRAMUSCULAR; INTRAVENOUS at 15:47

## 2022-07-20 RX ADMIN — DEXAMETHASONE SODIUM PHOSPHATE 10 MG: 10 INJECTION, SOLUTION INTRAMUSCULAR; INTRAVENOUS at 15:59

## 2022-07-20 RX ADMIN — OXYCODONE 10 MG: 5 TABLET ORAL at 06:08

## 2022-07-20 RX ADMIN — ONDANSETRON 4 MG: 2 INJECTION INTRAMUSCULAR; INTRAVENOUS at 17:25

## 2022-07-20 RX ADMIN — ONDANSETRON 4 MG: 2 INJECTION INTRAMUSCULAR; INTRAVENOUS at 06:08

## 2022-07-20 ASSESSMENT — PAIN SCALES - GENERAL
PAINLEVEL_OUTOF10: 7
PAINLEVEL_OUTOF10: 8
PAINLEVEL_OUTOF10: 8
PAINLEVEL_OUTOF10: 10
PAINLEVEL_OUTOF10: 4
PAINLEVEL_OUTOF10: 8
PAINLEVEL_OUTOF10: 4

## 2022-07-20 ASSESSMENT — PAIN DESCRIPTION - FREQUENCY
FREQUENCY: CONTINUOUS
FREQUENCY: INTERMITTENT

## 2022-07-20 ASSESSMENT — PAIN DESCRIPTION - LOCATION
LOCATION: LEG
LOCATION: ANKLE
LOCATION: ANKLE
LOCATION: ANKLE;LEG
LOCATION: ANKLE
LOCATION: ANKLE

## 2022-07-20 ASSESSMENT — PAIN DESCRIPTION - ORIENTATION
ORIENTATION: LEFT

## 2022-07-20 ASSESSMENT — ENCOUNTER SYMPTOMS
VOMITING: 1
BACK PAIN: 0
NAUSEA: 1
DIARRHEA: 0
COUGH: 0

## 2022-07-20 ASSESSMENT — PAIN - FUNCTIONAL ASSESSMENT
PAIN_FUNCTIONAL_ASSESSMENT: 0-10
PAIN_FUNCTIONAL_ASSESSMENT: NONE - DENIES PAIN
PAIN_FUNCTIONAL_ASSESSMENT: PREVENTS OR INTERFERES SOME ACTIVE ACTIVITIES AND ADLS
PAIN_FUNCTIONAL_ASSESSMENT: NONE - DENIES PAIN

## 2022-07-20 ASSESSMENT — PAIN DESCRIPTION - DESCRIPTORS
DESCRIPTORS: ACHING;DISCOMFORT;SPASM
DESCRIPTORS: ACHING
DESCRIPTORS: ACHING;DISCOMFORT;SORE
DESCRIPTORS: ACHING;DISCOMFORT

## 2022-07-20 ASSESSMENT — PAIN DESCRIPTION - PAIN TYPE
TYPE: ACUTE PAIN
TYPE: ACUTE PAIN

## 2022-07-20 ASSESSMENT — LIFESTYLE VARIABLES: SMOKING_STATUS: 0

## 2022-07-20 NOTE — CONSULTS
Orthopedic surgery acknowledging consult for 79-year-old female with obvious deformity of the left ankle. X-rays were ordered and reveals a completely displaced bimalleolar fracture with dislocation of the talus laterally.     Formal consult to follow

## 2022-07-20 NOTE — ED PROVIDER NOTES
ATTENDING PROVIDER ATTESTATION:     Carlton Keita presented to the emergency department for evaluation of Leg Injury (Pt brought to the ED via EMT after having a fall off her porch at home. Pt has obvious deformity to her left lower leg. Given ketamine PTA. )   and was initially evaluated by the Medical Resident. See Original ED Note for H&P and ED course above. I have reviewed and discussed the case, including pertinent history (medical, surgical, family and social) and exam findings with the Medical Resident assigned to Carlton Keita. I have personally performed and/or participated in the history, exam, medical decision making, and procedures and agree with all pertinent clinical information. I have reviewed my findings and recommendations with the assigned Medical Resident, Carlton Keita and members of family present at the time of disposition. My findings/plan: The primary encounter diagnosis was Closed bimalleolar fracture of left ankle, initial encounter. Diagnoses of Pre-op chest exam and Closed fracture of left ankle, initial encounter were also pertinent to this visit.   Current Discharge Medication List        MD Joyce Osborne MD  07/20/22 9620

## 2022-07-20 NOTE — H&P
Department of Internal Medicine  History and Physical    PCP: Dr. Bella Green   Admitting Physician: Dr. Hazel Colindres  Consultants: Dr. Danae Braxton:  Fall at home    HISTORY OF PRESENT ILLNESS:    Alok Ramires is a 72-year-old female who presented to [address] [place] [address] emergency department after suffering a fall at home. She states she stepped on a bottle of water spray and fell awkwardly. Work-up in the emergency department revealed fractures of the distal tibia and fibula with lateral subluxation of the ankle joint. The patient underwent closed reduction and splinting by the orthopedic team and has had some relief in her presenting ankle pain. We discussed her past medical history at length as we attempted to determine timing of definitive orthopedic correction. PAST MEDICAL Hx:  Past Medical History:   Diagnosis Date    Hypertension        PAST SURGICAL Hx:   Past Surgical History:   Procedure Laterality Date    HYSTERECTOMY (CERVIX STATUS UNKNOWN)         FAMILY Hx:  History reviewed. No pertinent family history. HOME MEDICATIONS:  Prior to Admission medications    Medication Sig Start Date End Date Taking?  Authorizing Provider   amLODIPine (NORVASC) 10 MG tablet Take 10 mg by mouth daily    Historical Provider, MD   triamterene-hydroCHLOROthiazide (DYAZIDE) 37.5-25 MG per capsule Take 1 capsule by mouth every morning    Historical Provider, MD   potassium chloride (MICRO-K) 10 MEQ extended release capsule Take 10 mEq by mouth 2 times daily    Historical Provider, MD   calcium carbonate 600 MG TABS tablet Take 1 tablet by mouth 2 times daily as needed    Historical Provider, MD   Multiple Vitamins-Minerals (CENTRUM SILVER PO) Take by mouth    Historical Provider, MD   ibuprofen (IBU) 400 MG tablet Take 1 tablet by mouth every 8 hours as needed for Pain 5/8/22 5/18/22  Preston Antunez MD       ALLERGIES:  Codeine    SOCIAL Hx:  Social History     Socioeconomic History    Marital status: Single Spouse name: Not on file    Number of children: Not on file    Years of education: Not on file    Highest education level: Not on file   Occupational History    Not on file   Tobacco Use    Smoking status: Never    Smokeless tobacco: Never   Substance and Sexual Activity    Alcohol use: Not Currently    Drug use: Never    Sexual activity: Not on file   Other Topics Concern    Not on file   Social History Narrative    Not on file     Social Determinants of Health     Financial Resource Strain: Not on file   Food Insecurity: Not on file   Transportation Needs: Not on file   Physical Activity: Not on file   Stress: Not on file   Social Connections: Not on file   Intimate Partner Violence: Not on file   Housing Stability: Not on file       ROS:  General:   Denies chills, fatigue, fever, malaise, night sweats or weight loss    Psychological:   Denies anxiety, disorientation or hallucinations    ENT:    Denies epistaxis, headaches, vertigo or visual changes    Cardiovascular:   Denies any chest pain, irregular heartbeats, or palpitations. No paroxysmal nocturnal dyspnea. Respiratory:   Denies shortness of breath, coughing, sputum production, hemoptysis, or wheezing. No orthopnea. Gastrointestinal:   Denies nausea, vomiting, diarrhea, or constipation. Denies any abdominal pain. Denies change in bowel habits or stools. Genito-Urinary:    Denies any urgency, frequency, hematuria. Voiding without difficulty. Musculoskeletal:   Left ankle pain as to be expected. Neurology:    Denies any headache or focal neurological deficits. No weakness or paresthesia. Derm:    Denies any rashes, ulcers, or excoriations. Denies bruising. Extremities:   Denies any lower extremity swelling or edema.       PHYSICAL EXAM:  VITALS:  Vitals:    07/20/22 0608   BP:    Pulse:    Resp: 16   Temp:    SpO2:          CONSTITUTIONAL:    Awake, alert, cooperative, no apparent distress, and appears stated age    EYES:    PERRL, EOMI, sclera clear, conjunctiva normal    ENT:    Normocephalic, atraumatic, sinuses nontender on palpation. External ears without lesions. Oral pharynx with moist mucus membranes. Tonsils without erythema or exudates. NECK:    Supple, symmetrical, trachea midline, no adenopathy, thyroid symmetric, not enlarged and no tenderness, skin normal, no bruits, no JVD    HEMATOLOGIC/LYMPHATICS:    No cervical lymphadenopathy and no supraclavicular lymphadenopathy    LUNGS:    Symmetric. No increased work of breathing, good air exchange, clear to auscultation bilaterally, no wheezes, rhonchi, or rales,     CARDIOVASCULAR:    Normal apical impulse, regular rate and rhythm, normal S1 and S2, no S3 or S4, and no murmur noted    ABDOMEN:    No scars, normal bowel sounds, soft, non-distended, non-tender, no masses palpated, no hepatosplenomegaly, no rebound or guarding elicited on palpation     MUSCULOSKELETAL:    There is no redness, warmth, or swelling of the joints. Full range of motion noted. Motor strength is 5 out of 5 all extremities bilaterally. Tone is normal.    NEUROLOGIC:    Awake, alert, oriented to name, place and time. Cranial nerves II-XII are grossly intact. Motor is 5 out of 5 bilaterally. SKIN:    No bruising or bleeding. No redness, warmth, or swelling    EXTREMITIES:    Splint is in place to the left ankle. Leg is elevated. OSTEOPATHIC:    Examined in seated and supine positions. Normal thoracic kyphosis and lumbar lordosis. No acute somatic dysfunction.     LABORATORY DATA:  CBC with Differential:    Lab Results   Component Value Date/Time    WBC 8.4 04/05/2018 03:30 PM    RBC 4.49 04/05/2018 03:30 PM    HGB 14.0 04/05/2018 03:30 PM    HCT 41.6 04/05/2018 03:30 PM     04/05/2018 03:30 PM    MCV 92.7 04/05/2018 03:30 PM    MCH 31.2 04/05/2018 03:30 PM    MCHC 33.7 04/05/2018 03:30 PM    RDW 12.1 04/05/2018 03:30 PM     BMP:    Lab Results   Component Value Date/Time     04/05/2018 03:30 PM    K 3.5 04/05/2018 03:30 PM    CL 98 04/05/2018 03:30 PM    CO2 24 04/05/2018 03:30 PM    BUN 16 04/05/2018 03:30 PM    LABALBU 4.5 04/05/2018 03:30 PM    CREATININE 1.0 04/05/2018 03:30 PM    CALCIUM 9.7 04/05/2018 03:30 PM    GFRAA >60 04/05/2018 03:30 PM    LABGLOM >60 04/05/2018 03:30 PM       ASSESSMENT:  Closed left trimalleolar ankle fracture and dislocation status post closed reduction and splinting with need for formal orthopedic correction once swelling has improved  Essential hypertension    PLAN:  I will discuss timing of definitive orthopedic intervention with the orthopedic team this morning. She lives at home and functions independently and if surgery will be delayed, she will require temporary skilled nursing facility placement. Otherwise, appropriate pain control is being undertaken. The patient is cleared for surgical intervention and we will monitor her hypertension accordingly.     Farshad Ellis DO, D.O., [facility]  7:29 AM  7/20/2022    Electronically signed by Farshad Ellis DO on 7/20/22 at 7:29 AM EDT

## 2022-07-20 NOTE — ED NOTES
Visitors have been told by multiple staff. 1 visitor per room (currently has 4 in room). [facility] police notified and requested all but 1 visitor leave the room. Visitors complied.      Becca Spear RN  07/20/22 9459

## 2022-07-20 NOTE — OP NOTE
Operative Note      Patient: Blake Tadeo  YOB: 1950  MRN: [id number]    Date of Procedure: 7/20/2022    Pre-Op Diagnosis: Closed displaced trimalleolar fracture of left ankle with routine healing, subsequent encounter [E00.744S]    Post-Op Diagnosis:   Closed trimalleolar ankle fracture  Closed dislocation left ankle joint       PROCEDURE:  1. Closed treatment of left trimalleolar ankle fracture with  manipulation   2. Closed treatment of ankle dislocation with anesthesia  3. Left knee application spanning external fixation. Surgeon(s): Marianela Ortega DO    Assistant:   Resident: Wild Madison DO; Nia Kimble DO    Anesthesia: General    Estimated Blood Loss (mL): 5    Complications: None    Specimens:   * No specimens in log *    Implants:  * No implants in log *      Drains:   Urinary Catheter [place] (Active)   $ Urethral catheter insertion $ Not inserted for procedure 07/20/22 1513   Catheter Indications Perioperative use for selected surgical procedures 07/20/22 1513   Site Assessment Pink 07/20/22 1513   Urine Color Yellow 07/20/22 1513   Urine Appearance Clear 07/20/22 1513   Collection Container Standard 07/20/22 1513   Output (mL) 550 mL 07/20/22 1513       Detailed Description of Procedure:      INDICATIONS:   The patient is a 70-year-old female, had a mechanical fall injuring the left ankle. She presented to the  ER for evaluation. Radiographs demonstrated a trimalleolar ankle fracture dislocation. The patient did have some deformity and obvious  instability of her fracture as well as acute swelling about the fracture site. Treatment options were discussed with recommendations for closed reduction  and application of spanning external fixation to allow her soft tissues to  improve. The patient understood this. The patient elected for surgery. Risks and benefits were well outlined in detail with the patient. She  verbalized an understanding.  All her questions were addressed to onto the [facility] and to the post-anesthesia care unit in  stable condition. POSTOPERATIVE PLANS: The patient will be admitted back to the  medical/surgical [facility]. She will receive 24 hours of postoperative  antibiotics. She is to maintain elevation and nonweightbearing of the left  lower extremity and to maintain her external fixator. Once her pain is  appropriately controlled, she will be discharged home with a 1-week followup  in the orthopedic office to further evaluate her soft tissues. She will also  be started on DVT prophylaxis, likely Lovenox while in house and will likely  be transitioned to aspirin upon discharge.      Electronically signed by Obie Plascencia DO on 7/20/2022 at 5:09 PM

## 2022-07-20 NOTE — ANESTHESIA PRE PROCEDURE
kg)   05/08/22 226 lb (102.5 kg)     Body mass index is 39.86 kg/m². CBC:   Lab Results   Component Value Date/Time    WBC 14.3 07/20/2022 03:11 PM    RBC 3.99 07/20/2022 03:11 PM    HGB 12.7 07/20/2022 03:11 PM    HCT 39.6 07/20/2022 03:11 PM    MCV 99.2 07/20/2022 03:11 PM    RDW 12.2 07/20/2022 03:11 PM     07/20/2022 03:11 PM       CMP:   Lab Results   Component Value Date/Time     04/05/2018 03:30 PM    K 3.5 04/05/2018 03:30 PM    CL 98 04/05/2018 03:30 PM    CO2 24 04/05/2018 03:30 PM    BUN 16 04/05/2018 03:30 PM    CREATININE 1.0 04/05/2018 03:30 PM    GFRAA >60 04/05/2018 03:30 PM    LABGLOM >60 04/05/2018 03:30 PM    PROT 8.6 04/05/2018 03:30 PM    CALCIUM 9.7 04/05/2018 03:30 PM    BILITOT 0.7 04/05/2018 03:30 PM    ALKPHOS 60 04/05/2018 03:30 PM    AST 24 04/05/2018 03:30 PM    ALT 31 04/05/2018 03:30 PM       POC Tests: No results for input(s): POCGLU, POCNA, POCK, POCCL, POCBUN, POCHEMO, POCHCT in the last 72 hours.     Coags:   Lab Results   Component Value Date/Time    PROTIME 12.0 07/20/2022 04:58 AM    INR 1.0 07/20/2022 04:58 AM    APTT 29.7 07/20/2022 10:06 AM       HCG (If Applicable): No results found for: PREGTESTUR, PREGSERUM, HCG, HCGQUANT     ABGs: No results found for: PHART, PO2ART, VWZ1YNR, CAB8RBC, BEART, Q1NLNVXR     Type & Screen (If Applicable):  No results found for: LABABO, LABRH    Drug/Infectious Status (If Applicable):  No results found for: HIV, HEPCAB    COVID-19 Screening (If Applicable): No results found for: COVID19        Anesthesia Evaluation  Patient summary reviewed no history of anesthetic complications:   Airway: Mallampati: II  TM distance: >3 FB   Neck ROM: full  Mouth opening: > = 3 FB   Dental: normal exam         Pulmonary:Negative Pulmonary ROS breath sounds clear to auscultation      (-) not a current smoker                           Cardiovascular:    (+) hypertension:,       ECG reviewed  Rhythm: regular  Rate: normal           Beta Blocker: Not on Beta Blocker         Neuro/Psych:   Negative Neuro/Psych ROS              GI/Hepatic/Renal:   (+) morbid obesity          Endo/Other:        (-) blood dyscrasia                ROS comment: Closed displaced bimalleolar fracture of left lower leg Abdominal:             Vascular: negative vascular ROS. Other Findings:           Anesthesia Plan      general     ASA 3       Induction: intravenous. MIPS: Postoperative opioids intended and Prophylactic antiemetics administered. Anesthetic plan and risks discussed with patient. Plan discussed with CRNA. DOS STAFF ADDENDUM:    Pt seen and examined, chart reviewed (including anesthesia, drug and allergy history). Anesthetic plan, risks, benefits, alternatives, and personnel involved discussed with patient. Patient verbalized an understanding and agrees to proceed. Plan discussed with care team members and agreed upon.     Nicolasa Acuña MD  Staff Anesthesiologist  3:26 PM      Nicolasa Acuña MD   7/20/2022

## 2022-07-20 NOTE — PROGRESS NOTES
Department of Orthopedic Surgery  Resident Progress Note    Patient seen and examined. Still having moderate pain in the left ankle, states she has not taken any pain meds since last night. Denies any numbness or tingling. States she is not sure she can go home anytime soon because she has no one to help take care of her    VITALS:  /64   Pulse 82   Temp 98.5 °F (36.9 °C) (Oral)   Resp 16   Ht 5' 3\" (1.6 m)   Wt 225 lb (102.1 kg)   SpO2 96%   BMI 39.86 kg/m²     General: alert and oriented to person, place and time, well-developed and well-nourished, in no acute distress    MUSCULOSKELETAL:   left lower extremity:  Splint maintained, C/D/I.   Window created to assess pulse and degree of swelling  Compartments soft and compressible  Able to plantarflex and dorsiflex all digits in splint  DP pulse +2, toes are warm and perfused with brisk capillary refill  Distal sensation grossly intact in areas where able to assess around splint  Does have some edema over the dorsal foot    CBC:   Lab Results   Component Value Date/Time    WBC 8.4 04/05/2018 03:30 PM    HGB 14.0 04/05/2018 03:30 PM    HCT 41.6 04/05/2018 03:30 PM     04/05/2018 03:30 PM     PT/INR:    Lab Results   Component Value Date/Time    PROTIME 12.0 07/20/2022 04:58 AM    INR 1.0 07/20/2022 04:58 AM       ASSESSMENT  Closed left trimalleolar ankle fracture dislocation status post closed reduction and splinting    PLAN      Continue physical therapy and protocol: NWB -L LE  Patient states that she does not have any help at home and cannot take care of her self, she would likely be discharged to a SNF, whether that is before or after having ORIF of the ankle will depend on the amount of swelling at the planned time of surgery  Will discuss with attendings today regarding OR timing  Pain medications ordered  ASA at discharge for DVT prophylaxis  Discuss with attending

## 2022-07-20 NOTE — CARE COORDINATION
7/20/2022: SS Note/Discharge plan:  SS Consult for \"SNF placement\", pt admitted with a Tibia/Fibula fracture from a fall and having orthopedic surgery today, met with pt and pt's son, He Ryan at his mother's bedside, explained sw role for transition of care planning and consult, pt currently a&o and pleasant, pt reports functioning independently prior to admission with no past SNF/Rehab placements, pt does anticipate needing a temporary [facility] placement, SNF list reviewed, pt prefers [facility], referral made to [facility] admissions liaison, will follow post-op for chart review and acceptance under pt's Medicare, NO PRE CERT needed, nursing informed, sw to follow. Electronically signed by MINAL Jorgensen on 7/20/2022 at 1:02 PM

## 2022-07-20 NOTE — BRIEF OP NOTE
Brief Postoperative Note      Patient: Gwenn Dubin  YOB: 1950  MRN: [id number]    Date of Procedure: 7/20/2022    Pre-Op Diagnosis: Closed displaced trimalleolar fracture of left ankle with routine healing, subsequent encounter [T51.980H]    Post-Op Diagnosis: Same and ankle dislocation       Procedure(s):  LEFT ANKLE EXTERNAL FIXATOR    Surgeon(s):   Clover Felty, DO    Assistant:  Resident: Iraida Kat DO; Haley Rikc DO    Anesthesia: General    Estimated Blood Loss (mL): 5    Complications: None    Specimens:   * No specimens in log *    Implants:  * No implants in log *      Drains:   Urinary Catheter [place] (Active)   $ Urethral catheter insertion $ Not inserted for procedure 07/20/22 1513   Catheter Indications Perioperative use for selected surgical procedures 07/20/22 1513   Site Assessment Pink 07/20/22 1513   Urine Color Yellow 07/20/22 1513   Urine Appearance Clear 07/20/22 1513   Collection Container Standard 07/20/22 1513   Output (mL) 550 mL 07/20/22 1513       Findings: see report      Electronically signed by Clover Felty, DO on 7/20/2022 at 5:08 PM

## 2022-07-20 NOTE — H&P
I have reviewed the history and physical and examined the patient and find no relevant changes. I have reviewed with the patient and/or family the risks, benefits, and alternatives to the procedure. The patient was counseled at length about the risks of michael Covid-19 during their perioperative period and any recovery window from their procedure. The patient was made aware that michael Covid-19  may worsen their prognosis for recovering from their procedure  and lend to a higher morbidity and/or mortality risk. All material risks, benefits, and reasonable alternatives including postponing the procedure were discussed. The patient does wish to proceed with the procedure at this time.         Osbaldo Fletcher,   7/20/2022

## 2022-07-20 NOTE — CONSULTS
Department of Orthopedic Surgery  Consult Note          Reason for Consult:  Left Ankle Pain    HISTORY OF PRESENT ILLNESS:    27-year-old female presents to [address] [place], [place] [address] emergency department for left ankle pain secondary to mechanical fall. Patient's reports shortly prior to presentation, she was attempting to kill a wasp and slipped on the insect killer landing on her left side. She denies head trauma and loss of consciousness. Denies NTP to affected extremity. Pain is reported as severe with radiation to the knee. She denies any other musculoskeletal complaint at this time. Patient is reporting associated nausea. She has that she has an allergy to codeine which she was provided fentanyl in the emergency department thus leading to emesis. Patient is currently present with family and reports that she has no one nearby to assist her with this injury. She wishes to be admitted to the hospital for further care. Patient is not established with an orthopedic surgeon. She denies anticoagulation use. She denies assistive devices for ambulation. Past surgical history of foot surgery performed by podiatrist in the [facility].       Tobacco use: None  Alcohol use: none  Illicit drug use: None    Past Medical History:        Diagnosis Date    Hypertension      Past Surgical History:        Procedure Laterality Date    HYSTERECTOMY (CERVIX STATUS UNKNOWN)       Current Medications:   Current Facility-Administered Medications: ondansetron (ZOFRAN) injection 4 mg, 4 mg, IntraVENous, Q6H PRN  ondansetron (ZOFRAN) injection 4 mg, 4 mg, IntraVENous, Q6H PRN  lidocaine-EPINEPHrine 1 %-1:417908 injection 20 mL, 20 mL, IntraDERmal, Once  ketamine (KETALAR) 10 MG/ML injection, , ,   propofol 200 MG/20ML injection, , ,   propofol injection 51 mg, 0.5 mg/kg, IntraVENous, Once  ketamine (KETALAR) injection 51.1 mg, 0.5 mg/kg, IntraVENous, Once  Allergies:  Codeine    Social History:   TOBACCO:   reports that she has never smoked. She has never used smokeless tobacco.  ETOH:   reports that she does not currently use alcohol. DRUGS:   reports no history of drug use. ACTIVITIES OF DAILY LIVING:    OCCUPATION:    Family History:   History reviewed. No pertinent family history. REVIEW OF SYSTEMS:  CONSTITUTIONAL:  negative for  fevers, chills  EYES:  negative for blurred vision, visual disturbance  HEENT:  negative for  hearing loss, voice change  RESPIRATORY:  negative for  dyspnea, wheezing  CARDIOVASCULAR:  negative for  chest pain, palpitations  GASTROINTESTINAL:  negative for nausea, vomiting  GENITOURINARY:  negative for frequency, urinary incontinence  HEMATOLOGIC/LYMPHATIC:  negative for bleeding and petechiae  MUSCULOSKELETAL:  positive for  pain, joint swelling, and bone pain  NEUROLOGICAL:  negative for headaches, dizziness  BEHAVIOR/PSYCH:  negative for increased agitation and anxiety    PHYSICAL EXAM:    VITALS:  BP (!) 172/69   Pulse (!) 123   Temp 98.4 °F (36.9 °C) (Oral)   Resp 20   Ht 5' 4\" (1.626 m)   Wt 225 lb (102.1 kg)   SpO2 96%   BMI 38.62 kg/m²   General appearance: alert, well appearing, and in no distress,  normal appearing weight. No visible signs of trauma   Mental status: alert, oriented to person, place, and time, normal mood, behavior, speech, dress, motor activity, and thought processes  Abdomen: soft, nondistended  Resp:   resp easy and unlabored, no audible wheezes note, normal symmetrical expansion of both hemithoraces  Cardiac: distal pulses palpable, skin and extremities well perfused  Neurological: alert, oriented X3, normal speech, no focal findings or movement disorder noted, motor and sensory grossly normal bilaterally, normal muscle tone, no tremors, strength 5/5, normal gait and station  HEENT: normochephalic atraumatic, external ears and eyes normal, sclera normal, neck supple, no nasal discharge.    Extremities:   peripheral pulses normal, no edema, redness or tenderness in the calves   Skin: normal coloration, no rashes or open wounds, no suspicious skin lesions noted  Psych: Affect euthymic   MUSCULOSKELETAL:  Left lower Extremity:  Skin intact circumferentially. Mild abrasions noted at the knee. Mild ecchymosis noted at the ankle. Significant ankle swelling noted. No warmth felt to palpation compared to contralateral side. No erythema noted. + TTP diffusely over the Ankle  Limited range of motion at the ankle and sagittal plane secondary to pain and swelling  Compartments soft and compressible, calf non-tender  +DP & PT pulses, Brisk Cap refill, Toes warm and perfused  Sensation grossly intact superficial/deep peroneal,saphenous,sural,tibial n. distributions  +GS/TA/EHL/FHL    Secondary Exam:   bilateralUE: No obvious signs of trauma. -TTP to fingers, hand, wrist, forearm, elbow, humerus, shoulder or clavicle. rightLE: No obvious signs of trauma. -TTP to foot, ankle, leg, knee, thigh, hip. Sensation intact distally: Saphenous/sural/tibial/superficial and deep peroneal nerve distribution + TA/GS/EHL/FHL. Pulses intact DP/PT. Cap refill brisk    Pelvis: -TTP, -Log roll, -Heel strike     DATA:    CBC:   Lab Results   Component Value Date/Time    WBC 8.4 04/05/2018 03:30 PM    RBC 4.49 04/05/2018 03:30 PM    HGB 14.0 04/05/2018 03:30 PM    HCT 41.6 04/05/2018 03:30 PM    MCV 92.7 04/05/2018 03:30 PM    MCH 31.2 04/05/2018 03:30 PM    MCHC 33.7 04/05/2018 03:30 PM    RDW 12.1 04/05/2018 03:30 PM     04/05/2018 03:30 PM    MPV 10.9 04/05/2018 03:30 PM     PT/INR:  No results found for: PROTIME, INR    Radiology Review:  07/19/22 - XR left ankle demonstrating completely displaced bimalleolar fracture. Talus completely dislocated laterally. Oblique fracture through the lateral malleolus, short removed and displaced. Small fracture at the medial malleolus. Difficult to appreciate posterior malleolus involvement given orientation of the current films.        Left foot demonstrating no acute fracture dislocations    XR left tibia/fibula demonstrate no acute fracture dislocations of the proximal/middle or distal third of the tibia. IMPRESSION:    Closed, Left Trimalleolar Ankle Fracture  Left ankle dislocation    PLAN:  Patient seen and examined. Imaging reviewed with patient in detail. Natural history and course discussed with patient in long discussion  Treatment options discussed with patient in detail including risks and benefits. After obtaining written consent, conscious sedation was performed extricated by ED resident and attending. The ankle underwent closed reduction, however several attempts while sedated were attempted due to the instability of the ankle. Ultimately was able to maintain reduction with the talus within the mortise. Application of well padded 3-sided posterior splint. Neurovascular status was unchanged  Patient indicated that she does not have help or assistance at home and wishes to be admitted due to fear of worsening her injury. Family is at bedside and agreeable to patient's decision. Patient will be admitted to the hospital for possible operative intervention scheduled. Non-weight bearing to injured extremity  Pain medication per primary  CT scan will be ordered to further evaluate degree of injury and for preoperative planning  Continue ice and elevation to decrease swelling  NPO diet effective now  Neurovascular checks  Risks, benefits, and alternatives were discussed with the patient and their family. Risks include but are not limited to infection, blood loss, damage to neurovascular and musculoskeletal structures, malunion, nonunion, symptomatic hardware, need for further surgery, possible arthritis despite surgical stabilization, stiffness, and catastrophic anesthesia complications. They understand and wish to proceed.

## 2022-07-20 NOTE — ED NOTES
Report given to [facility] on 3rd floor.       Sylvia Yang RN  07/20/22 [id number]

## 2022-07-20 NOTE — DISCHARGE INSTRUCTIONS
Your information:  Name: Paris Corona  : 1950    Your instructions:  Discharge to [facility]. Orthopedics Discharge Instructions   Weight bearing Status - non-weight bearing - Operative Extremity  Pain medication Per Prescription  Ice to operative/injured site for 15-30 minutes of each hour for next 3-5 days           Elevate operative/injured limb on 2 pillows at home  Continue DVT Prophylaxis as Prescribed  Fracture Care -  Keep ex-fix clean, dry and intact until seen in office. Pin care will be discussed at that time  Follow Up in Office in 1 weeks with Dr. Kizzy Naidu     Call the office at [phone] for directions or with any questions. Watch for these significant complications. Call physician if they or any other problems occur:  Fever over 101°, redness, swelling or warmth at the operative site  Unrelieved nausea                          Foul smelling or cloudy drainage at the operative site       Unrelieved pain                   Blood soaked dressing.  (Some oozing may be normal)                Numb, pale, blue, cold or tingling extremity    What to do after you leave the hospital:    Recommended diet: regular diet    Recommended activity: NWB LLE    The following personal items were collected during your admission and were returned to you:    Belongings  Dental Appliances: None  Vision - Corrective Lenses: None  Hearing Aid: None  Clothing: Shorts, Undergarments  Jewelry: None  Body Piercings Removed: N/A  Electronic Devices: None  Weapons (Notify Protective Services/Security): None  Other Valuables: Purse, Wallet, Money  Home Medications: None  Valuables Given To: Family (Comment)  Provide Name(s) of Who Valuable(s) Were Given To: li borja  Responsible person(s) in the waiting room: na  Patient approves for provider to speak to responsible person post operatively: Yes    Information obtained by:  By signing below, I understand that if any problems occur once I leave the hospital I am to contact Dr. Prosper Rangel. I understand and acknowledge receipt of the instructions indicated above.

## 2022-07-20 NOTE — PROGRESS NOTES
CT Ankle reviewed and demonstrates a continued dislocation of the tibiotalar joint. Patient will go later today for ex fix vs orif of the ankle.      NPO  Treatment consent  Hold anticoagulation   Ice and elevation   Appropriate pain control

## 2022-07-21 VITALS
BODY MASS INDEX: 39.87 KG/M2 | RESPIRATION RATE: 18 BRPM | OXYGEN SATURATION: 94 % | TEMPERATURE: 97.8 F | SYSTOLIC BLOOD PRESSURE: 122 MMHG | DIASTOLIC BLOOD PRESSURE: 85 MMHG | HEIGHT: 63 IN | WEIGHT: 225 LBS | HEART RATE: 71 BPM

## 2022-07-21 LAB
ANION GAP SERPL CALCULATED.3IONS-SCNC: 13 MMOL/L (ref 7–16)
BASOPHILS ABSOLUTE: 0.01 E9/L (ref 0–0.2)
BASOPHILS RELATIVE PERCENT: 0.1 % (ref 0–2)
BUN BLDV-MCNC: 16 MG/DL (ref 6–23)
CALCIUM SERPL-MCNC: 8.9 MG/DL (ref 8.6–10.2)
CHLORIDE BLD-SCNC: 101 MMOL/L (ref 98–107)
CO2: 26 MMOL/L (ref 22–29)
CREAT SERPL-MCNC: 1.3 MG/DL (ref 0.5–1)
EKG ATRIAL RATE: 81 BPM
EKG P AXIS: 53 DEGREES
EKG P-R INTERVAL: 198 MS
EKG Q-T INTERVAL: 438 MS
EKG QRS DURATION: 96 MS
EKG QTC CALCULATION (BAZETT): 508 MS
EKG R AXIS: -3 DEGREES
EKG T AXIS: 30 DEGREES
EKG VENTRICULAR RATE: 81 BPM
EOSINOPHILS ABSOLUTE: 0 E9/L (ref 0.05–0.5)
EOSINOPHILS RELATIVE PERCENT: 0 % (ref 0–6)
GFR AFRICAN AMERICAN: 49
GFR NON-AFRICAN AMERICAN: 49 ML/MIN/1.73
GLUCOSE BLD-MCNC: 136 MG/DL (ref 74–99)
HCT VFR BLD CALC: 38.9 % (ref 34–48)
HEMOGLOBIN: 12.3 G/DL (ref 11.5–15.5)
IMMATURE GRANULOCYTES #: 0.06 E9/L
IMMATURE GRANULOCYTES %: 0.4 % (ref 0–5)
LYMPHOCYTES ABSOLUTE: 1.15 E9/L (ref 1.5–4)
LYMPHOCYTES RELATIVE PERCENT: 8 % (ref 20–42)
MCH RBC QN AUTO: 31.3 PG (ref 26–35)
MCHC RBC AUTO-ENTMCNC: 31.6 % (ref 32–34.5)
MCV RBC AUTO: 99 FL (ref 80–99.9)
MONOCYTES ABSOLUTE: 1.01 E9/L (ref 0.1–0.95)
MONOCYTES RELATIVE PERCENT: 7 % (ref 2–12)
NEUTROPHILS ABSOLUTE: 12.23 E9/L (ref 1.8–7.3)
NEUTROPHILS RELATIVE PERCENT: 84.5 % (ref 43–80)
PDW BLD-RTO: 12.3 FL (ref 11.5–15)
PLATELET # BLD: 270 E9/L (ref 130–450)
PMV BLD AUTO: 11 FL (ref 7–12)
POTASSIUM SERPL-SCNC: 3.5 MMOL/L (ref 3.5–5)
RBC # BLD: 3.93 E12/L (ref 3.5–5.5)
SARS-COV-2, NAAT: NOT DETECTED
SODIUM BLD-SCNC: 140 MMOL/L (ref 132–146)
WBC # BLD: 14.5 E9/L (ref 4.5–11.5)

## 2022-07-21 PROCEDURE — 80048 BASIC METABOLIC PNL TOTAL CA: CPT

## 2022-07-21 PROCEDURE — 36415 COLL VENOUS BLD VENIPUNCTURE: CPT

## 2022-07-21 PROCEDURE — 6360000002 HC RX W HCPCS: Performed by: FAMILY MEDICINE

## 2022-07-21 PROCEDURE — 2580000003 HC RX 258: Performed by: FAMILY MEDICINE

## 2022-07-21 PROCEDURE — 6370000000 HC RX 637 (ALT 250 FOR IP): Performed by: FAMILY MEDICINE

## 2022-07-21 PROCEDURE — 6370000000 HC RX 637 (ALT 250 FOR IP): Performed by: INTERNAL MEDICINE

## 2022-07-21 PROCEDURE — 87635 SARS-COV-2 COVID-19 AMP PRB: CPT

## 2022-07-21 PROCEDURE — 97165 OT EVAL LOW COMPLEX 30 MIN: CPT

## 2022-07-21 PROCEDURE — 97530 THERAPEUTIC ACTIVITIES: CPT

## 2022-07-21 PROCEDURE — 97161 PT EVAL LOW COMPLEX 20 MIN: CPT | Performed by: PHYSICAL THERAPIST

## 2022-07-21 PROCEDURE — 97530 THERAPEUTIC ACTIVITIES: CPT | Performed by: PHYSICAL THERAPIST

## 2022-07-21 PROCEDURE — 85025 COMPLETE CBC W/AUTO DIFF WBC: CPT

## 2022-07-21 RX ORDER — TRAMADOL HYDROCHLORIDE 50 MG/1
100 TABLET ORAL EVERY 6 HOURS PRN
Status: DISCONTINUED | OUTPATIENT
Start: 2022-07-21 | End: 2022-07-21

## 2022-07-21 RX ORDER — TRAMADOL HYDROCHLORIDE 50 MG/1
50 TABLET ORAL EVERY 6 HOURS PRN
Status: DISCONTINUED | OUTPATIENT
Start: 2022-07-21 | End: 2022-07-22 | Stop reason: HOSPADM

## 2022-07-21 RX ORDER — POTASSIUM BICARBONATE 25 MEQ/1
50 TABLET, EFFERVESCENT ORAL ONCE
Status: DISCONTINUED | OUTPATIENT
Start: 2022-07-21 | End: 2022-07-21 | Stop reason: CLARIF

## 2022-07-21 RX ORDER — TRAMADOL HYDROCHLORIDE 50 MG/1
50 TABLET ORAL EVERY 6 HOURS PRN
Qty: 28 TABLET | Refills: 0 | Status: SHIPPED | OUTPATIENT
Start: 2022-07-21 | End: 2022-07-28

## 2022-07-21 RX ADMIN — TRIAMTERENE AND HYDROCHLOROTHIAZIDE 1 TABLET: 37.5; 25 TABLET ORAL at 08:24

## 2022-07-21 RX ADMIN — ASPIRIN 325 MG: 325 TABLET, COATED ORAL at 20:32

## 2022-07-21 RX ADMIN — Medication 10 ML: at 20:35

## 2022-07-21 RX ADMIN — ASPIRIN 325 MG: 325 TABLET, COATED ORAL at 08:25

## 2022-07-21 RX ADMIN — Medication 10 ML: at 11:02

## 2022-07-21 RX ADMIN — TRAMADOL HYDROCHLORIDE 50 MG: 50 TABLET, COATED ORAL at 13:04

## 2022-07-21 RX ADMIN — OXYCODONE 10 MG: 5 TABLET ORAL at 03:06

## 2022-07-21 RX ADMIN — TRAMADOL HYDROCHLORIDE 50 MG: 50 TABLET, COATED ORAL at 20:32

## 2022-07-21 RX ADMIN — AMLODIPINE BESYLATE 10 MG: 10 TABLET ORAL at 08:24

## 2022-07-21 RX ADMIN — ONDANSETRON 4 MG: 2 INJECTION INTRAMUSCULAR; INTRAVENOUS at 03:26

## 2022-07-21 RX ADMIN — POTASSIUM BICARBONATE 40 MEQ: 782 TABLET, EFFERVESCENT ORAL at 08:25

## 2022-07-21 RX ADMIN — CEFAZOLIN 2000 MG: 2 INJECTION, POWDER, FOR SOLUTION INTRAMUSCULAR; INTRAVENOUS at 03:06

## 2022-07-21 ASSESSMENT — PAIN - FUNCTIONAL ASSESSMENT: PAIN_FUNCTIONAL_ASSESSMENT: PREVENTS OR INTERFERES SOME ACTIVE ACTIVITIES AND ADLS

## 2022-07-21 ASSESSMENT — PAIN DESCRIPTION - LOCATION: LOCATION: ANKLE

## 2022-07-21 ASSESSMENT — PAIN SCALES - GENERAL
PAINLEVEL_OUTOF10: 6
PAINLEVEL_OUTOF10: 5
PAINLEVEL_OUTOF10: 7

## 2022-07-21 ASSESSMENT — PAIN DESCRIPTION - ORIENTATION: ORIENTATION: LEFT

## 2022-07-21 ASSESSMENT — PAIN DESCRIPTION - DESCRIPTORS: DESCRIPTORS: ACHING;DISCOMFORT

## 2022-07-21 NOTE — PROGRESS NOTES
[address]  [facility] [place]. [place]        Date:2022                                                  Patient Name: Amara Moreau    MRN: [id number]    : 1950    Room: [address] [place] [address]      Evaluating OT: Debra Starr OTR/L; [id number]     Referring Provider and Specific Provider Orders/Date:      22  OT eval and treat  Start:  22,   End:  22,   ONE TIME,   Standing Count:  1 Occurrences,   R         Chicho Ramirez DO      Placement Recommendation: Subacute        Diagnosis:   1. Closed bimalleolar fracture of left ankle, initial encounter    2. Pre-op chest exam    3. Closed fracture of left ankle, initial encounter    4. Closed displaced bimalleolar fracture of left lower leg, initial encounter         Surgery/PROCEDURE:  1. Closed treatment of left trimalleolar ankle fracture with manipulation   2. Closed treatment of ankle dislocation with anesthesia  3. Left knee application spanning external fixation.      Pertinent Medical History:       Past Medical History:   Diagnosis Date    Hypertension          Past Surgical History:   Procedure Laterality Date    ANKLE FRACTURE SURGERY Left 2022    LEFT ANKLE EXTERNAL FIXATOR performed by Nelly Carbone DO at [address] (CERVIX STATUS UNKNOWN)        Precautions:  Fall Risk, NWB: L LE, distal L LE external fixator      Assessment of current deficits:     [x] Functional mobility  [x]ADLs  [x] Strength               []Cognition    [x] Functional transfers   [x] IADLs         [] Safety Awareness   [x]Endurance    [] Fine Coordination              [x] Balance      [] Vision/perception   []Sensation     []Gross Motor Coordination  [x] ROM  [] Delirium                   [] Motor Control     OT PLAN OF CARE   OT POC based on physician orders, patient diagnosis and results of clinical assessment    Frequency/Duration 1-3 days/wk for 2 weeks PRN     Specific OT Treatment Interventions to include:   * Instruction/training on adapted ADL techniques and AE recommendations to increase functional independence within precautions       * Training on energy conservation strategies, correct breathing pattern and techniques to improve independence/tolerance for self-care routine  * Functional transfer/mobility training/DME recommendations for increased independence, safety, and fall prevention  * Patient/Family education to increase follow through with safety techniques and functional independence  * Recommendation of environmental modifications for increased safety with functional transfers/mobility and ADLs  * Therapeutic exercise to improve motor endurance, ROM, and functional strength for ADLs/functional transfers  * Therapeutic activities to facilitate/challenge dynamic balance, stand tolerance for increased safety and independence with ADLs  * Positioning to improve skin integrity, interaction with environment and functional independence    Recommended Adaptive Equipment: TBD at rehab      Home Living: alone; single family home, 1 story, 5 steps to enter with rail, tub shower. Equipment owned: standard walker    Prior Level of Function: Independent with ADLs , Independent with IADLs; ambulated with no device    Driving: yes    Pain Level: 3/10 pain in L ankle; pt assisted back to bed and L LE elevated on pillows. Nursing notified.       Cognition: A&O: 4/4; Follows 3 step directions   Memory: good    Sequencing: good    Problem solving: good    Judgement/safety: good     [facility]   AM-PAC Daily Activity Inpatient   How much help for putting on and taking off regular lower body clothing?: Total  How much help for Bathing?: A Lot  How much help for Toileting?: Total  How much help for putting on and taking off regular upper body clothing?: A Little  How much help for taking care of personal grooming?: A Little  How much help for eating meals?: A Little  AM-[facility] Inpatient Daily Activity Raw Score: 13  AM-PAC Inpatient ADL T-Scale Score : 32.03  ADL Inpatient CMS 0-100% Score: 63.03  ADL Inpatient CMS G-Code Modifier : CL     Functional Assessment:    Initial Eval Status  Date: 7/21/22 Treatment Status  Date: STGs = LTGs  Time frame: 10-14 days   Feeding Supervision with set up   Independent    Grooming Supervision with set up   Independent    UB Dressing Minimal Assist   Independent    LB Dressing Dependent   Modified [place]    Bathing Maximal Assist  Minimal Assist    Toileting Dependent   Minimal Assist    Bed Mobility  Supine to sit: N/T as pt was up in chair   Sit to supine: Supervision   Supine to sit: Independent   Sit to supine: Independent    Functional Transfers Moderate Assist x 2 from bedside chair to wheeled walker   Moderate assist for transfer from standing to edge of bed   Transfer training with verbal cues for hand placement throughout session to improve safety. Minimal Assist    Functional Mobility Moderate Assist x 2 with bedside chair to edge of bed to improve balance, verbal cues for walker sequence and safety. Pt used toe heel shuffle of R LE, fair adherence to NWB: R LE  Modified [place]    Balance Sitting:     Static: good     Dynamic: fair plus  Standing: fair minus with wheeled walker     Activity Tolerance Fair/fair minus  good    Visual/  Perceptual Glasses: yes, readers                 Hand Dominance: right      AROM (PROM) Strength Additional Info:    RUE  WFL 4/5 good  and wfl FMC/dexterity noted during ADL tasks     LUE WFL 4/5 good  and wfl FMC/dexterity noted during ADL tasks       Hearing: [place]   Sensation:  No c/o numbness or tingling  Tone: WFL   Edema: yes, L LE    Comments: Upon arrival the patient was seated in bedside chair with L LE elevated. At end of session, patient was  with call light and phone within reach, all lines and tubes intact.   Overall patient demonstrated decreased independence and safety during completion of ADL/functional transfer/mobility tasks. Pt would benefit from continued skilled OT to increase safety and independence with completion of ADL/IADL tasks for functional independence and quality of life. Treatment: OT treatment provided this date includes:   Instruction/training on safety and adapted techniques for completion of ADLs   Instruction/training on safe functional mobility/transfer techniques   Instruction/training on energy conservation/work simplification for completion of ADLs   Proper Positioning/Alignment    Rehab Potential: Good for established goals. Patient / Family Goal: go to rehab      Patient and/or family were instructed on functional diagnosis, prognosis/goals and OT plan of care. Demonstrated good understanding. Eval Complexity: Low    Time In: 9:25am   Time Out: 10:05am    Total Treatment Time: 10      Min Units   OT Eval Low 97165  X  1    OT Eval Medium 47741      OT Eval High 61092      OT Re-Eval [id number]            ADL/Self Care 74852     Therapeutic Activities 80164  10  1    Therapeutic Ex 87683       Orthotic Management 14789       Manual 48660     Neuro Re-Ed 62300       Non-Billable Time        Evaluation Time additionally includes thorough review of current medical information, gathering information on past medical history/social history and prior level of function, interpretation of standardized testing/informal observation of tasks, assessment of data and development of plan of care and goals.         Evaluating OT: Farhan Wakefield OTR/L; [id number]

## 2022-07-21 NOTE — DISCHARGE INSTR - COC
Continuity of Care Form    Patient Name: Nik Cerna   :  1950  MRN:  [id number]    Admit date:  2022  Discharge date:  2022    Code Status Order: Full Code   Advance Directives:     Admitting Physician:  Thi Carmona DO  PCP: Bob Camacho MD    Discharging Nurse: [facility] Unit/Room#: 4043/2265-40  Discharging Unit Phone Number: [phone]    Emergency Contact:   Extended Emergency Contact Information  Primary Emergency Contact: Eleazar Castillo  Address:  [address] [place] [address], [facility] [address] Phone: [phone]  Work Phone: [phone]  Mobile Phone: [phone]  Relation: Other   needed?  No  Secondary Emergency Contact: Anai López  Home Phone: [phone]  Relation: Child    Past Surgical History:  Past Surgical History:   Procedure Laterality Date    HYSTERECTOMY (CERVIX STATUS UNKNOWN)         Immunization History:   Immunization History   Administered Date(s) Administered    COVID-19, PFIZER GRAY top, DO NOT Dilute, (age 15 y+), IM, 30 mcg/0.3 mL 2022    COVID-19, PFIZER PURPLE top, DILUTE for use, (age 15 y+), 30mcg/0.3mL 2021, 2021, 10/13/2021       Active Problems:  Patient Active Problem List   Diagnosis Code    Closed bimalleolar fracture of left ankle S82.842A    Tibia/fibula fracture S82.209A, S82.409A       Isolation/Infection:   Isolation            No Isolation          Patient Infection Status       None to display            Nurse Assessment:  Last Vital Signs: BP (!) 112/55   Pulse 70   Temp 97.6 °F (36.4 °C) (Oral)   Resp 18   Ht 5' 3\" (1.6 m)   Wt 225 lb (102.1 kg)   SpO2 92%   BMI 39.86 kg/m²     Last documented pain score (0-10 scale): Pain Level: 7  Last Weight:   Wt Readings from Last 1 Encounters:   22 225 lb (102.1 kg)     Mental Status:  oriented and alert    IV Access:  - None    Nursing Mobility/ADLs:  Walking   Dependent  Transfer  Dependent  Bathing Address:  Phone:  Fax:    Dialysis Facility (if applicable)   Name:  Address:  Dialysis Schedule:  Phone:  Fax:    / signature: {Esignature:[id number]}    PHYSICIAN SECTION    Prognosis: {Prognosis:1144401896}    Condition at Discharge: Nohemi Plascencia Patient Condition:925020566}    Rehab Potential (if transferring to Rehab): {Prognosis:4578805322}    Recommended Labs or Other Treatments After Discharge: ***    Physician Certification: I certify the above information and transfer of Gerard Sacks  is necessary for the continuing treatment of the diagnosis listed and that she requires {Admit to Appropriate Level of Care:97375} for {GREATER/LESS:666943295} 30 days.      Update Admission H&P: {CHP DME Changes in HDYCZ:045203720}    PHYSICIAN SIGNATURE:  Electronically signed by Sammi Barlow DO on 7/21/22 at 7:07 AM EDT

## 2022-07-21 NOTE — PROGRESS NOTES
Department of Orthopedic Surgery  Resident Progress Note    Patient seen and examined. Pain is well controlled this morning, she denies any numbness or tingling, however she is vomiting and states that the pain medications have been making her nauseous.     VITALS:  /85   Pulse 71   Temp 97.8 °F (36.6 °C)   Resp 18   Ht 5' 3\" (1.6 m)   Wt 225 lb (102.1 kg)   SpO2 94%   BMI 39.86 kg/m²     General: alert and oriented to person, place and time, well-developed and well-nourished, active vomiting in the room, but no acute distress    MUSCULOSKELETAL:   left lower extremity:  External fixator to left ankle, no bloody discharge on dressings, Ace wraps C/D/I  Compartments soft and compressible  Able to plantarflex and dorsiflex all digits  DP pulse +2, toes are warm and perfused  Distal sensation grossly intact in peroneal, sural, saphenous, tibial distribution    CBC:   Lab Results   Component Value Date/Time    WBC 14.5 07/21/2022 07:59 AM    HGB 12.3 07/21/2022 07:59 AM    HCT 38.9 07/21/2022 07:59 AM     07/21/2022 07:59 AM     PT/INR:    Lab Results   Component Value Date/Time    PROTIME 12.0 07/20/2022 04:58 AM    INR 1.0 07/20/2022 04:58 AM       ASSESSMENT  Closed left trimalleolar ankle fracture dislocation status post application of external fixator on 7/20    PLAN      Continue physical therapy and protocol: NWB -L SHARON  Patient states that she does not have any help at home and cannot take care of her self, she is likely going to be discharged to rehab facility, but we will plan on doing definitive fixation sometime next week once the swelling subsides  Will discuss with attendings today regarding OR timing  Pain medications ordered, will review orders and try alternative to oxycodone which the patient believes is making her vomit  ASA at discharge for DVT prophylaxis  Discuss with attending

## 2022-07-21 NOTE — CARE COORDINATION
7/21/2022: SS Note/Discharge plan:  Notified by Chris Reina admissions for [facility] that they have accepted pt for [facility] admission and transfer confirmed for today, physicians ambulance scheduled for 8:30pm  but will try to outsource trip for an earlier time, request a RAPID COVID TEST prior to transfer, nursing notified, pt and pt's son Jimmy Etienne notified of transfer arrangements, [address] and HENS completed. Electronically signed by MINAL Temple on 7/21/2022 at 2:46 PM

## 2022-07-21 NOTE — PROGRESS NOTES
Physical Therapy    Physical Therapy Initial Evaluation/Plan of Care    Room #:  3090/3070-10  Patient Name: Tanner Dillon  YOB: 1950  MRN: [id number]    Date of Service: 7/21/2022     Tentative placement recommendation: Subacute rehab  Equipment recommendation: To be determined      Evaluating Physical Therapist: Ellen Encarnacion Physical Therapist      Specific Provider Orders/Date/Referring Provider : 07/20/22 1845    PT eval and treat  Start:  07/20/22 1845,   End:  07/20/22 1845,   ONE TIME,   Standing Count:  1 Occurrences,   R         Kathleen Perez DO     Admitting Diagnosis:   Closed displaced bimalleolar fracture of left lower leg, initial encounter [S82.842A]  Closed bimalleolar fracture of left ankle, initial encounter [F99.413Q]    Admitted with completely displaced bimalleolar fracture with dislocation of the talus laterally. Surgery:     Date:  7/20/2022           Surgeon: Surgeon(s): Kenia Licona DO  Procedure: Procedure(s):  LEFT ANKLE EXTERNAL FIXATOR    Visit Diagnoses         Codes    Closed bimalleolar fracture of left ankle, initial encounter    -  Primary S87.968R    Pre-op chest exam     Z01.811    Closed fracture of left ankle, initial encounter     S82.892A            Patient Active Problem List   Diagnosis    Closed bimalleolar fracture of left ankle    Tibia/fibula fracture        ASSESSMENT of Current Deficits Patient exhibits decreased strength, balance, and endurance impairing functional mobility, transfers, gait , gait distance, and tolerance to activity. Patient has active motion of her toes on the left side and is able to perform both glut and quad sets. Patient requires cueing to maintain non weight bearing precautions during sit to stand transfers and takings heel toe steps to the chair. Patient would benefit from increased education and repetition on how to perform functional mobility while maintaining NWB precautions. PHYSICAL THERAPY  PLAN OF CARE       Physical therapy plan of care is established based on physician order,  patient diagnosis and clinical assessment    Current Treatment Recommendations:  -Sitting Balance: Incorporate reaching activities to activate trunk muscles   -Standing Balance: Perform strengthening exercises in standing to promote motor control with or without upper extremity support   -Transfers: Provide instruction on proper hand and foot position for adequate transfer of weight onto lower extremities and use of gait device if needed and Cues for hand placement, technique and safety. Provide stabilization to prevent fall   -Gait: Gait training and Standing activities to improve: base of support, weight shift, weight bearing    -Endurance: Utilize Supervised activities to increase level of endurance to allow for safe functional mobility including transfers and gait     PT long term treatment goals are located in below grid    Patient and or family understand(s) diagnosis, prognosis, and plan of care. Frequency of treatments: Patient will be seen  daily. Prior Level of Function: Patient ambulated independently   Rehab Potential: good  for baseline    Past medical history:   Past Medical History:   Diagnosis Date    Hypertension      Past Surgical History:   Procedure Laterality Date    ANKLE FRACTURE SURGERY Left 7/20/2022    LEFT ANKLE EXTERNAL FIXATOR performed by Jose Oliver DO at [address] (CERVIX STATUS UNKNOWN)         SUBJECTIVE:    Precautions: Ambulate patient , falls, alarm, and non weight bearing left lower extremity      RADIOLOGY:  XR TIBIA FIBULA LEFT (2 VIEWS)   Final Result   1. Fractures of the distal tibia and fibula with lateral subluxation of the   ankle joint.      Social history: Patient lives alone in a ranch home  with 4 steps  to enter with Rail  Tub shower  no grab bars     Equipment owned: None    AM-PAC Basic Mobility       AM-PAC Mobility Inpatient   How much difficulty turning over in bed?: None  How much difficulty sitting down on / standing up from a chair with arms?: A Lot  How much difficulty moving from lying on back to sitting on side of bed?: A Little  How much help from another person moving to and from a bed to a chair?: A Lot  How much help from another person needed to walk in hospital room?: A Lot  How much help from another person for climbing 3-5 steps with a railing?: Total  AM-PAC Inpatient Mobility Raw Score : 14  AM-PAC Inpatient T-Scale Score : 38.1  Mobility Inpatient CMS 0-100% Score: 61.29  Mobility Inpatient CMS G-Code Modifier : CL    Nursing cleared patient for PT evaluation. The admitting diagnosis and active problem list as listed above have been reviewed prior to the initiation of this evaluation. OBJECTIVE;   Initial Evaluation  Date: 07/21/2022 Treatment Date:     Short Term/ Long Term   Goals   Was pt agreeable to Eval/treatment? Yes  To be met in 3 days   Pain level   0/10       Bed Mobility    Rolling: Supervision     Supine to sit: Minimal assist of 1    Sit to supine: Minimal assist of 1    Scooting: Minimal assist of 1    Rolling: Independent    Supine to sit: Independent    Sit to supine: Independent    Scooting: Independent     Transfers Sit to stand:  Moderate assist of  2 cues for nonweightbearing precautions, upper extremity hand placement, and weight shift onto right lower extremity   Sit to stand: Minimal assist of 1 with wheeled walker    Ambulation     5 heel toe steps using  wheeled walker with Moderate assist of  2   to maintain precautions     15 feet using  wheeled walker with Supervision  and left non weight bearing gait pattern     Stair negotiation: ascended and descended   Not assessed        ROM Within functional limits except for left ankle and foot   Increase range of motion 10% of affected joints    Strength BUE:  refer to OT eval  RLE:  4/5  LLE:  not tested   Increase strength in affected mm groups by 1/3 grade   Balance Sitting EOB:  good   Dynamic Standing:  fair - wheeled walker   Sitting EOB:  good   Dynamic Standing: good - wheeled walker      Patient is Alert & Oriented x person, place, time, and situation and follows directions    Sensation:  Patient  denies numbness/tingling   Edema:  yes left lower extremity   Endurance: good  -    Vitals: room air   Blood Pressure at rest  Blood Pressure during session    Heart Rate at rest 93 Heart Rate during session    SPO2 at rest 98%  SPO2 during session %     Patient education  Patient educated on role of Physical Therapy, risks of immobility, safety and plan of care,  importance of mobility while in hospital , ankle pumps, quad set and glut set for edema control, blood clot prevention, importance and purpose of adaptive device and adjusted to proper height for the patient. , and weight bearing status      Patient response to education:   Pt verbalized understanding Pt demonstrated skill Pt requires further education in this area   Yes Partial Yes      Treatment:  Patient practiced and was instructed/facilitated in the following treatment: Patient performed supine to sit transfer, Sat edge of bed 10 minutes with Supervision  to increase dynamic sitting balance and activity tolerance. Patient performed sit to stand transfer x2, took heel toe steps to transfer from bed to chair, performed seated exercises while in chair. Patient requires rest breaks in between transfers today due to feelings of dizziness and nausea. Therapeutic Exercises:  ankle pumps, quad sets, glut sets, and long arc quad  x 10 reps. (Ankle pumps and long arc quads only done on right)       At end of session, patient in chair with     call light and phone within reach,  all lines and tubes intact, nursing notified. Patient would benefit from continued skilled Physical Therapy to improve functional independence and quality of life.          Patient's/ family goals   rehab

## 2022-07-21 NOTE — CARE COORDINATION
7/21/2022: SS Note/Discharge plan:  Contacted by pt's son, Danisha Go that pt/family now prefer [address] as their first preference and [facility] 2nd choice and [facility] as 3rd choice now, referral made to Eleazar Jackson admissions liaison for [facility] who confirmed that they do have a rehab bed available to admit pt, awaiting PT/OT evals and acceptance under pt's Medicare for potential admission today, NO PRE CERT NEEDED, sw notified [facility] liaison for [facility], nursing informed, sw will follow to confirm d/c plan and transfer arrangements. Electronically signed by MINAL Smith on 7/21/2022 at 9:46 AM

## 2022-07-21 NOTE — PLAN OF CARE
Problem: Discharge Planning  Goal: Discharge to home or other facility with appropriate resources  7/21/2022 1330 by Nini Quinones RN  Outcome: Progressing  7/21/2022 0435 by Miguel Chau RN  Outcome: Progressing     Problem: Pain  Goal: Verbalizes/displays adequate comfort level or baseline comfort level  7/21/2022 1330 by Nini Quinones RN  Outcome: Progressing  7/21/2022 0435 by Miguel Chau RN  Outcome: Progressing     Problem: Safety - Adult  Goal: Free from fall injury  7/21/2022 1330 by Nini Quinones RN  Outcome: Progressing  7/21/2022 0435 by Miguel Chau RN  Outcome: Progressing     Problem: ABCDS Injury Assessment  Goal: Absence of physical injury  7/21/2022 1330 by Nini Quinones RN  Outcome: Progressing  7/21/2022 0435 by Miguel Chau RN  Outcome: Progressing

## 2022-07-21 NOTE — DISCHARGE SUMMARY
Internal Medicine Progress Note     [facility]     Anne Coelho. Mike Jackman., CAMACHO.A.CNnekaONnekaI. Pratibha Tarango D.O., TOMASOMIKEY Haines, MSN, APRN, NP-C  Allison Berumen. Sallie Romo, MSN, APRN-CNP       Internal Medicine  Discharge Summary    NAME: Suzanna Choudhury  :  1950  MRN:  [id number]  Eugene Litten, MD  ADMITTED: 2022      DISCHARGED: 22    ADMITTING PHYSICIAN: Qasim Sharif DO    CONSULTANT(S):   IP CONSULT TO ORTHOPEDIC SURGERY  IP CONSULT TO ANESTHESIOLOGY  IP CONSULT TO SOCIAL WORK     ADMITTING DIAGNOSIS:   Closed displaced bimalleolar fracture of left lower leg, initial encounter [S82.842A]  Closed bimalleolar fracture of left ankle, initial encounter [S82.842A]     DISCHARGE DIAGNOSES:   Closed left trimalleolar ankle fracture and dislocation status post closed reduction and splinting with subsequent closed treatment of left trimalleolar ankle fracture with manipulation and closed treatment of ankle dislocation with anesthesia with application of left knee external fixation  Essential hypertension    BRIEF HISTORY OF PRESENT ILLNESS:   Lesly Kim is a 28-year-old female who presented to [address] [place] [address] emergency department after suffering a fall at home. She states she stepped on a bottle of water spray and fell awkwardly. Work-up in the emergency department revealed fractures of the distal tibia and fibula with lateral subluxation of the ankle joint. The patient underwent closed reduction and splinting by the orthopedic team and has had some relief in her presenting ankle pain. We discussed her past medical history at length as we attempted to determine timing of definitive orthopedic correction.     LABS[de-identified]  Lab Results   Component Value Date    WBC 14.3 (H) 2022    HGB 12.7 2022    HCT 39.6 2022     2022     2022    K 3.1 (L) 2022     2022    CREATININE 1.0 07/20/2022    BUN 14 07/20/2022    CO2 26 07/20/2022    GLUCOSE 124 (H) 07/20/2022    ALT 31 04/05/2018    AST 24 04/05/2018    INR 1.0 07/20/2022     Lab Results   Component Value Date    INR 1.0 07/20/2022    PROTIME 12.0 07/20/2022      Lab Results   Component Value Date    TSH 1.250 04/05/2018     No results found for: TRIG  Lab Results   Component Value Date    HDL 62 04/05/2018     Lab Results   Component Value Date    LDLCALC 121 (H) 04/05/2018     No results found for: LABA1C    IMAGING:  XR TIBIA FIBULA LEFT (2 VIEWS)    Result Date: 7/19/2022  EXAMINATION: 2 XRAY VIEWS OF THE LEFT ANKLE;  2 XRAY VIEWS OF THE LEFT TIBIA AND FIBULA; THREE XRAY VIEWS OF THE LEFT FOOT 7/19/2022 7:37 pm COMPARISON: None. HISTORY: ORDERING SYSTEM PROVIDED HISTORY: fall, deformity TECHNOLOGIST PROVIDED HISTORY: Reason for exam:->fall, deformity FINDINGS: Radiographs of the right leg and ankle demonstrate fractures of the distal tibial and fibular shafts, with lateral subluxation of the ankle joint. Due to the deformity in the ankle is suboptimally evaluated. Radiographs of the left foot reveal no evidence of fracture or joint dislocation. The joint spaces are grossly preserved. Soft tissue swelling is seen along the distal leg and ankle. 1. Fractures of the distal tibia and fibula with lateral subluxation of the ankle joint. 2. The remainder of the tibia and fibula as well as left foot. 3. Suboptimal visualization of the ankle joint due to deformity. Attention recommended the the on post reduction radiographs. XR ANKLE LEFT (2 VIEWS)    Result Date: 7/19/2022  EXAMINATION: 2 XRAY VIEWS OF THE LEFT ANKLE;  2 XRAY VIEWS OF THE LEFT TIBIA AND FIBULA; THREE XRAY VIEWS OF THE LEFT FOOT 7/19/2022 7:37 pm COMPARISON: None.  HISTORY: ORDERING SYSTEM PROVIDED HISTORY: fall, deformity TECHNOLOGIST PROVIDED HISTORY: Reason for exam:->fall, deformity FINDINGS: Radiographs of the right leg and ankle demonstrate fractures of the distal tibial and fibular shafts, with lateral subluxation of the ankle joint. Due to the deformity in the ankle is suboptimally evaluated. Radiographs of the left foot reveal no evidence of fracture or joint dislocation. The joint spaces are grossly preserved. Soft tissue swelling is seen along the distal leg and ankle. 1. Fractures of the distal tibia and fibula with lateral subluxation of the ankle joint. 2. The remainder of the tibia and fibula as well as left foot. 3. Suboptimal visualization of the ankle joint due to deformity. Attention recommended the the on post reduction radiographs. XR ANKLE LEFT (MIN 3 VIEWS)    Result Date: 7/20/2022  EXAMINATION: THREE XRAY VIEWS OF THE LEFT ANKLE 7/19/2022 11:29 pm COMPARISON: Earlier today HISTORY: ORDERING SYSTEM PROVIDED HISTORY: post reduction TECHNOLOGIST PROVIDED HISTORY: Reason for exam:->post reduction FINDINGS: Status post interval reduction and splinting of the distal tibial and fibular fractures, now in improved anatomic alignment. No new osseous abnormality is identified. Circumferential soft tissue swelling. Status post interval reduction and splinting. XR FOOT LEFT (MIN 3 VIEWS)    Result Date: 7/19/2022  EXAMINATION: 2 XRAY VIEWS OF THE LEFT ANKLE;  2 XRAY VIEWS OF THE LEFT TIBIA AND FIBULA; THREE XRAY VIEWS OF THE LEFT FOOT 7/19/2022 7:37 pm COMPARISON: None. HISTORY: ORDERING SYSTEM PROVIDED HISTORY: fall, deformity TECHNOLOGIST PROVIDED HISTORY: Reason for exam:->fall, deformity FINDINGS: Radiographs of the right leg and ankle demonstrate fractures of the distal tibial and fibular shafts, with lateral subluxation of the ankle joint. Due to the deformity in the ankle is suboptimally evaluated. Radiographs of the left foot reveal no evidence of fracture or joint dislocation. The joint spaces are grossly preserved. Soft tissue swelling is seen along the distal leg and ankle.      1. Fractures of the distal tibia and fibula with lateral subluxation of the ankle joint. 2. The remainder of the tibia and fibula as well as left foot. 3. Suboptimal visualization of the ankle joint due to deformity. Attention recommended the the on post reduction radiographs. XR CHEST PORTABLE    Result Date: 7/20/2022  EXAMINATION: ONE XRAY VIEW OF THE CHEST 7/20/2022 10:07 am COMPARISON: None. HISTORY: ORDERING SYSTEM PROVIDED HISTORY: Pre-op chest exam TECHNOLOGIST PROVIDED HISTORY: Reason for exam:->pre-operative planning and medical clearance FINDINGS: Single AP upright portable chest demonstrate satisfactory expansion lungs which are clear. The cardiac silhouette appears normal.  The soft tissues and osseous structures are normal.  There is no evidence of a pneumothorax. No acute cardiopulmonary process. CT ANKLE LEFT WO CONTRAST    Result Date: 7/20/2022  EXAMINATION: CT OF THE LEFT ANKLE WITHOUT CONTRAST 7/20/2022 7:41 am TECHNIQUE: CT of the left ankle was performed without the administration of intravenous contrast.  Multiplanar reformatted images are provided for review. Automated exposure control, iterative reconstruction, and/or weight based adjustment of the mA/kV was utilized to reduce the radiation dose to as low as reasonably achievable. COMPARISON: None. HISTORY ORDERING SYSTEM PROVIDED HISTORY: ankle vs pilon fx TECHNOLOGIST PROVIDED HISTORY: Reason for exam:->ankle vs pilon fx FINDINGS: Bones: There is a trimalleolar fracture dislocation of the ankle joint. There is an oblique supra syndesmotic fracture of the distal fibula. Distal fibular remains in its normal position adjacent to the talus bone. Distal tibiofibular syndesmosis appears disrupted however the superior portion portion of the syndesmosis may remain intact associated with the proximal fibular fragment. There is a comminuted transverse posterior malleolar fracture extending into the medial malleolus.   The transverse fracture involves the posterior 5-6 mm of the tibial plafond. The fracture is mildly comminuted. The posterior malleolar fracture extends into the medial malleolus posteriorly. No displaced fractures of the talus. Midfoot and calcaneus appear intact. Subtalar joints appear normal. Soft Tissue: Soft tissue swelling is noted. Small amount of anterior soft tissue gas is present. Open fracture suspected. Joint: Posterior dislocation of the talus in relation to the tibia is present. 1.  Supra syndesmotic fracture of the fibula with probable disruption of the inferior aspect of the tibio-fibular syndesmosis. 2.  Transverse posterior malleolar fracture involving 5 mm of the posterior tibial plafond and extending into the medial malleolus. 3.  Complete posterior dislocation of the talus in relation to the tibia. 4.  Minimal gas in the soft tissues anterior to the distal tibia, possible open fracture. Fluoro For Surgical Procedures    Result Date: 7/20/2022  EXAMINATION: SPOT FLUOROSCOPIC IMAGES 7/20/2022 1:17 pm TECHNIQUE: Fluoroscopy was provided by the radiology department for procedure. Radiologist was not present during examination. FLUOROSCOPY DOSE AND TYPE OR TIME AND EXPOSURES: Total fluoroscopic time: 35.2 seconds. Total estimated patient dose: 0.84 mGy. Total number of images: 5 COMPARISON: None HISTORY: ORDERING SYSTEM PROVIDED HISTORY: Closed fracture of left ankle, initial encounter TECHNOLOGIST PROVIDED HISTORY: Reason for exam:->ins external fixator left ankle in or Intraprocedural imaging. FINDINGS: 5 spot images of the left ankle were obtained. Intraprocedural fluoroscopic spot images as above. See separate procedure report for more information. HOSPITAL COURSE:   Aaaksh Myers did well during the hospitalization. She was initially stabilized and splinted in the emergency department with findings of a trimalleolar fracture. She underwent formal surgical intervention with placement of an external fixator on 7/20/2022.   She will now be discharged to the nursing home facility and will be followed up by the orthopedic team as an outpatient for formal management of the fracture. Otherwise, her pain is well controlled. Her home medications have been resumed. BRIEF PHYSICAL EXAMINATION AND LABORATORIES ON DAY OF DISCHARGE:  VITALS:  BP (!) 112/55   Pulse 70   Temp 97.6 °F (36.4 °C) (Oral)   Resp 18   Ht 5' 3\" (1.6 m)   Wt 225 lb (102.1 kg)   SpO2 92%   BMI 39.86 kg/m²     HEENT:  PERRLA. EOMI. Sclera clear. Buccal mucosa moist.    Neck:  Supple. Trachea midline. No thyromegaly. No JVD. No bruits. Heart:  Rhythm regular, rate controlled. No murmurs. Lungs:  Symmetrical. Clear to auscultation bilaterally. No wheezes. No rhonchi. No rales. Abdomen: Soft. Non-tender. Non-distended. Bowel sounds positive. No organomegaly or masses. No pain on palpation    Extremities:  Peripheral pulses present. External fixator in place. Neurologic:  Alert x 3. No focal deficit. Cranial nerves grossly intact. Skin:  No petechia. No hemorrhage. No wounds. DISPOSITION:  The patient's condition is good. At this time the patient is without objective evidence of an acute process requiring continuing hospitalization or inpatient management. They are stable for discharge with outpatient follow-up. I have spoken with the patient and discussed the results of the current hospitalization, in addition to providing specific details for the plan of care and counseling regarding the diagnosis and prognosis. The plan has been discussed in detail and they are aware of the specific conditions for emergent return, as well as the importance of follow-up.   Their questions are answered at this time and they are agreeable with the plan for discharge to nursing home    DISCHARGE MEDICATIONS:   Current Discharge Medication List             Details   oxyCODONE-acetaminophen (PERCOCET) 5-325 MG per tablet Take 1 tablet by mouth every 6 hours as needed for Pain for up to 7 days. Intended supply: 7 days. Take lowest dose possible to manage pain  Qty: 28 tablet, Refills: 0    Comments: Reduce doses taken as pain becomes manageable  Associated Diagnoses: Closed displaced bimalleolar fracture of left lower leg, initial encounter      aspirin 325 MG EC tablet Take 1 tablet by mouth in the morning and 1 tablet before bedtime. Do all this for 28 days. Qty: 56 tablet, Refills: 0                Details   amLODIPine (NORVASC) 10 MG tablet Take 10 mg by mouth daily      triamterene-hydroCHLOROthiazide (DYAZIDE) 37.5-25 MG per capsule Take 1 capsule by mouth every morning      potassium chloride (MICRO-K) 10 MEQ extended release capsule Take 10 mEq by mouth 2 times daily      calcium carbonate 600 MG TABS tablet Take 1 tablet by mouth 2 times daily as needed      Multiple Vitamins-Minerals (CENTRUM SILVER PO) Take by mouth      ibuprofen (IBU) 400 MG tablet Take 1 tablet by mouth every 8 hours as needed for Pain  Qty: 30 tablet, Refills: 0             FOLLOW UP/INSTRUCTIONS:  This patient is instructed to follow-up with her primary care physician. Patient is instructed to follow-up with the consults listed above as directed by them. she is instructed to resume home medications and take new medications as indicated in the list above. If the patient has a recurrence of symptoms, she is instructed to go to the ED. Preparing for this patient's discharge, including paperwork, orders, instructions, and meeting with patient did require > 40 minutes.     Qasim Sharif DO   7/21/2022  7:07 AM

## 2022-07-22 ENCOUNTER — TELEPHONE (OUTPATIENT)
Dept: ORTHOPEDIC SURGERY | Age: 72
End: 2022-07-22

## 2022-07-22 NOTE — TELEPHONE ENCOUNTER
Prior Authorization Form:      DEMOGRAPHICS:                     Patient Name:  Linda Berrios  Patient :  1950            Insurance:  Payor: Jessie Narayan / Plan: Vicky Gilmore / Product Type: *No Product type* /   Insurance ID Number:    Payer/Plan Subscr  Sex Relation Sub. Ins. ID Effective Group Num   1. [facility] E 1950 Female Self [id number] 22                                    c/o Ranelle Canavan, [address], [address] [place] [id number]   2.  [address] 1950 Female Self [id number]                                   [address]         DIAGNOSIS & PROCEDURE:                       Procedure/Operation: ORIF - LEFT ANKLE           CPT Code: 29375    Diagnosis:  CLOSED BIMALLEOLAR FRACTURE LEFT ANKLE    ICD10 Code: C06.430Q    Location:  [facility]    Surgeon:  MIKEY Muniz Scarce INFORMATION:                          Date: 2022    Time: TBA              Anesthesia:  General                                                       Status:  Outpatient        Special Comments:  NONE       Electronically signed by Keily Ashley on 2022 at 9:25 AM

## 2022-07-22 NOTE — PROGRESS NOTES
[address] [facility]                                                                                                                    PRE OP INSTRUCTIONS FOR  Ryley Adan        Date: 7/22/2022    Date of surgery: 7/25/22 1345      Arrival Time: 5594 in [place] 8057 in the hospital    Do not eat or drink anything after midnight prior to surgery. This includes no water, chewing gum, mints or ice chips. Take the following medications with a small sip of water on the morning of Surgery: Amlodipine and Tramadol if needed for pain     Diabetics may take evening dose of insulin but none after midnight. If you feel symptomatic or low blood sugar morning of surgery drink 1-2 ounces of apple juice only. Aspirin, Ibuprofen, Advil, Naproxen, Vitamin E and other Anti-inflammatory products should be stopped  before surgery  as directed by your physician. Take Tylenol only unless instructed otherwise by your surgeon. Check with your Doctor regarding stopping Plavix, Coumadin, Lovenox, Eliquis, Effient, or other blood thinners. Do not smoke,use illicit drugs and do not drink any alcoholic beverages 24 hours prior to surgery. You may brush your teeth the morning of surgery. DO NOT SWALLOW WATER    You MUST make arrangements for a responsible adult to take you home after your surgery. You will not be allowed to leave alone or drive yourself home. It is strongly suggested someone stay with you the first 24 hrs. Your surgery will be cancelled if you do not have a ride home. PEDIATRIC PATIENTS ONLY:  A parent/legal guardian must accompany a child scheduled for surgery and plan to stay at the hospital until the child is discharged. Please do not bring other children with you.     Please wear simple, loose fitting clothing to the hospital.  Marigene Pel not bring valuables (money, credit cards, checkbooks, etc.) Do not wear any makeup (including no eye makeup) or nail polish on your fingers or toes.    DO NOT wear any jewelry or piercings on day of surgery. All body piercing jewelry must be removed. Shower the night before surgery with _x__Antibacterial soap /FRED WIPES________    TOTAL JOINT REPLACEMENT/HYSTERECTOMY PATIENTS ONLY---Remember to bring Blood Bank bracelet to the hospital on the day of surgery. If you have a Living Will and Durable Power of  for Healthcare, please bring in a copy. If appropriate bring crutches, inspirex, WALKER, CANE etc... Notify your Surgeon if you develop any illness between now and surgery time, cough, cold, fever, sore throat, nausea, vomiting, etc.  Please notify your surgeon if you experience dizziness, shortness of breath or blurred vision between now & the time of your surgery. If you have _x__dentures, they will be removed before going to the OR; we will provide you a container. If you wear ___contact lenses or _x__glasses, they will be removed; please bring a case for them. To provide excellent care visitors will be limited to 2 in the room at any given time. Please bring picture ID and insurance card. Sleep apnea patients need to bring CPAP AND SETTINGS to hospital on day of surgery. During flu season no children under the age of 15 are permitted in the hospital for the safety of all patients. Other                  Please call AMBULATORY CARE if you have any further questions.    [facility]     [address]

## 2022-07-22 NOTE — DISCHARGE INSTRUCTIONS
Your information:  Name: Shaneka Aburto  : 1950    [facility] INSTRUCTIONS TO HOME     ACTIVITY INSTRUCTIONS:    [x]Elevate operative/injured limb on 2 pillows at home   []Sling to operative/injured limb  [x]No heavy lifting, pushing, pulling or strenuous activity      [x]Use crutches  []Use  walker   [x]Use  wheelchair  [x]Weight bearing Status: None left extremity    []Other    WOUND/DRESSING INSTRUCTIONS:  [facility] to perform the following wound care or dressing changes. Always ensure you and your care giver have clean hands before and after caring for the wound. []May shower after dressing has been removed     [x]May NOT shower until seen in office and sutures/staples removed  [x]Keep splint clean and dry, do not remove or get wet      [x]Keep dressing clean and dry            [x]Do not remove dressing     MEDICATION INSTRUCTIONS:  [x]Take pain medicine as directed. When taking pain medications, you may experience dizziness or drowsiness. Do not drink alcohol or drive when taking these medications. [x]Take blood thinner medications as directed and continue taking until instructed to stop by your Orthopaedic Surgeon. [x]Give the list of your medications to your primary care physician on your next visit. Keep your med list updated and carry it with you in case of emergency  Other Instructions:    FOLLOW-UP CARE:  [x]Follow up with your Orthopaedic Surgeon in 2 weeks. Call the office for directions or with any questions. Signs and symptoms to look out for:  Call 911  anytime you think you may need emergency care. For example, call if:    You passed out (lost consciousness). You have chest pain, are short of breath, or cough up blood. Call your doctor now or seek immediate medical care if:    You have pain that does not get better after you take pain medicine. Your foot or toes are tingly, weak, or numb.      Your boot or cast feels too tight.     Your foot is cool or pale or changes color. You are sick to your stomach or can't keep down fluids. You have loose stitches, or your incision comes open. You have signs of a blood clot in your leg (called a deep vein thrombosis), such as:  Pain in your calf, back of the knee, thigh, or groin. Redness or swelling in your leg. You have symptoms of infection, such as: Increased pain, swelling, warmth, or redness. Red streaks leading from the area. Pus draining from the area. A fever. Bright red blood has soaked through the bandage over your incision. Watch closely for changes in your health, and be sure to contact your doctor if:    You have a problem with your boot or cast.     You do not get better as expected. The following personal items were collected during your admission and were returned to you:    Belongings  Dental Appliances: None  Vision - Corrective Lenses: None  Hearing Aid: None  Clothing: Other (Comment) (clothes at Facility)  Jewelry: None  Body Piercings Removed: N/A  Electronic Devices: Cell Phone,   Weapons (Notify Protective Services/Security): None  Other Valuables: Money, Purse, Wallet (purse sent home with son)  Home Medications: None  Valuables Given To: Family (Comment)  Provide Name(s) of Who Valuable(s) Were Given To: Ammon Mendez  Responsible person(s) in the waiting room: N/a  Patient approves for provider to speak to responsible person post operatively: Yes    Information obtained by:  By signing below, I understand that if any problems occur once I leave the hospital I am to contact Dr. Trung Stubbs. I understand and acknowledge receipt of the instructions indicated above.

## 2022-07-23 ENCOUNTER — ANESTHESIA EVENT (OUTPATIENT)
Dept: OPERATING ROOM | Age: 72
DRG: 493 | End: 2022-07-23
Payer: MEDICARE

## 2022-07-23 RX ORDER — SODIUM CHLORIDE 0.9 % (FLUSH) 0.9 %
5-40 SYRINGE (ML) INJECTION PRN
Status: CANCELLED | OUTPATIENT
Start: 2022-07-23

## 2022-07-23 RX ORDER — HYDRALAZINE HYDROCHLORIDE 20 MG/ML
10 INJECTION INTRAMUSCULAR; INTRAVENOUS
Status: CANCELLED | OUTPATIENT
Start: 2022-07-23

## 2022-07-23 RX ORDER — LABETALOL HYDROCHLORIDE 5 MG/ML
10 INJECTION, SOLUTION INTRAVENOUS
Status: CANCELLED | OUTPATIENT
Start: 2022-07-23

## 2022-07-23 RX ORDER — SODIUM CHLORIDE 0.9 % (FLUSH) 0.9 %
5-40 SYRINGE (ML) INJECTION EVERY 12 HOURS SCHEDULED
Status: CANCELLED | OUTPATIENT
Start: 2022-07-23

## 2022-07-23 RX ORDER — DIPHENHYDRAMINE HYDROCHLORIDE 50 MG/ML
12.5 INJECTION INTRAMUSCULAR; INTRAVENOUS
Status: CANCELLED | OUTPATIENT
Start: 2022-07-23 | End: 2022-07-23

## 2022-07-23 RX ORDER — OXYCODONE HYDROCHLORIDE 5 MG/1
5 TABLET ORAL
Status: CANCELLED | OUTPATIENT
Start: 2022-07-23 | End: 2022-07-23

## 2022-07-23 RX ORDER — PROCHLORPERAZINE EDISYLATE 5 MG/ML
5 INJECTION INTRAMUSCULAR; INTRAVENOUS
Status: CANCELLED | OUTPATIENT
Start: 2022-07-23 | End: 2022-07-23

## 2022-07-23 RX ORDER — HALOPERIDOL 5 MG/ML
1 INJECTION INTRAMUSCULAR
Status: CANCELLED | OUTPATIENT
Start: 2022-07-23 | End: 2022-07-23

## 2022-07-23 RX ORDER — IPRATROPIUM BROMIDE AND ALBUTEROL SULFATE 2.5; .5 MG/3ML; MG/3ML
1 SOLUTION RESPIRATORY (INHALATION)
Status: CANCELLED | OUTPATIENT
Start: 2022-07-23 | End: 2022-07-23

## 2022-07-23 RX ORDER — SODIUM CHLORIDE 9 MG/ML
INJECTION, SOLUTION INTRAVENOUS PRN
Status: CANCELLED | OUTPATIENT
Start: 2022-07-23

## 2022-07-23 ASSESSMENT — LIFESTYLE VARIABLES: SMOKING_STATUS: 0

## 2022-07-23 NOTE — ANESTHESIA PRE PROCEDURE
Department of Anesthesiology  Preprocedure Note       Name:  Carolin Self   Age:  67 y.o.  :  1950                                          MRN:  [id number]         Date:  2022      Surgeon: Ted Mills): Deandre Logan DO    Procedure: Procedure(s):  LEFT ANKLE OPEN REDUCTION INTERNAL FIXATION **DO NOT CHANGE TIME**    Medications prior to admission:   Prior to Admission medications    Medication Sig Start Date End Date Taking? Authorizing Provider   traMADol (ULTRAM) 50 MG tablet Take 1 tablet by mouth every 6 hours as needed for Pain for up to 7 days. Intended supply: 7 days. Take lowest dose possible to manage pain 22  Mlastrid Romero DO   aspirin 325 MG EC tablet Take 1 tablet by mouth in the morning and 1 tablet before bedtime. Do all this for 28 days.  22  Juice Romero DO   amLODIPine (NORVASC) 10 MG tablet Take 10 mg by mouth daily    Historical Provider, MD   triamterene-hydroCHLOROthiazide (DYAZIDE) 37.5-25 MG per capsule Take 1 capsule by mouth every morning    Historical Provider, MD   potassium chloride (MICRO-K) 10 MEQ extended release capsule Take 10 mEq by mouth 2 times daily    Historical Provider, MD   calcium carbonate 600 MG TABS tablet Take 1 tablet by mouth 2 times daily as needed    Historical Provider, MD   Multiple Vitamins-Minerals (CENTRUM SILVER PO) Take by mouth    Historical Provider, MD   ibuprofen (IBU) 400 MG tablet Take 1 tablet by mouth every 8 hours as needed for Pain 22  Preston Leon MD       Current medications:    Current Facility-Administered Medications   Medication Dose Route Frequency Provider Last Rate Last Admin    lactated ringers infusion   IntraVENous Continuous Madelyn Zavala MD 10 mL/hr at 22 1228 New Bag at 22 1228    ceFAZolin (ANCEF) 3,000 mg in dextrose 5 % 100 mL IVPB  3,000 mg IntraVENous Once Deandre Logan DO        scopolamine (TRANSDERM-SCOP) transdermal patch 1 patch  1 patch TransDERmal Q72H Jered Mitchell MD   1 patch at 07/25/22 1525       Allergies:     Allergies   Allergen Reactions    Codeine Nausea And Vomiting       Problem List:    Patient Active Problem List   Diagnosis Code    Closed bimalleolar fracture of left ankle S82.842A    Tibia/fibula fracture S82.209A, S82.409A       Past Medical History:        Diagnosis Date    Hypertension     PONV (postoperative nausea and vomiting)        Past Surgical History:        Procedure Laterality Date    ANKLE FRACTURE SURGERY Left 7/20/2022    LEFT ANKLE EXTERNAL FIXATOR performed by Rashid Cali DO at [address] (CERVIX STATUS UNKNOWN)         Social History:    Social History     Tobacco Use    Smoking status: Never    Smokeless tobacco: Never   Substance Use Topics    Alcohol use: Not Currently                                Counseling given: Not Answered      Vital Signs (Current):   Vitals:    07/25/22 1145   BP: (!) 164/70   Pulse: 93   Resp: 18   Temp: 97.6 °F (36.4 °C)   TempSrc: Temporal   SpO2: 97%                                              BP Readings from Last 3 Encounters:   07/25/22 (!) 164/70   07/21/22 122/85   05/08/22 138/76       NPO Status: Time of last liquid consumption: 0800 (sip with meds)                        Time of last solid consumption: 2200                        Date of last liquid consumption: 07/25/22                        Date of last solid food consumption: 07/24/22    BMI:   Wt Readings from Last 3 Encounters:   07/20/22 225 lb (102.1 kg)   05/08/22 226 lb (102.5 kg)     There is no height or weight on file to calculate BMI.    CBC:   Lab Results   Component Value Date/Time    WBC 14.5 07/21/2022 07:59 AM    RBC 3.93 07/21/2022 07:59 AM    HGB 12.3 07/21/2022 07:59 AM    HCT 38.9 07/21/2022 07:59 AM    MCV 99.0 07/21/2022 07:59 AM    RDW 12.3 07/21/2022 07:59 AM     07/21/2022 07:59 AM       CMP:   Lab Results   Component Value Date/Time     07/21/2022 07:59 AM    K 3.5 07/21/2022 07:59 AM     07/21/2022 07:59 AM    CO2 26 07/21/2022 07:59 AM    BUN 16 07/21/2022 07:59 AM    CREATININE 1.3 07/21/2022 07:59 AM    GFRAA 49 07/21/2022 07:59 AM    LABGLOM 49 07/21/2022 07:59 AM    GLUCOSE 136 07/21/2022 07:59 AM    PROT 8.6 04/05/2018 03:30 PM    CALCIUM 8.9 07/21/2022 07:59 AM    BILITOT 0.7 04/05/2018 03:30 PM    ALKPHOS 60 04/05/2018 03:30 PM    AST 24 04/05/2018 03:30 PM    ALT 31 04/05/2018 03:30 PM       POC Tests: No results for input(s): POCGLU, POCNA, POCK, POCCL, POCBUN, POCHEMO, POCHCT in the last 72 hours. Coags:   Lab Results   Component Value Date/Time    PROTIME 12.0 07/20/2022 04:58 AM    INR 1.0 07/20/2022 04:58 AM    APTT 29.7 07/20/2022 10:06 AM       HCG (If Applicable): No results found for: PREGTESTUR, PREGSERUM, HCG, HCGQUANT     ABGs: No results found for: PHART, PO2ART, SNZ2NUZ, PZL3AAF, BEART, Q0KJGXRY     Type & Screen (If Applicable):  No results found for: LABABO, LABRH    Drug/Infectious Status (If Applicable):  No results found for: HIV, HEPCAB    COVID-19 Screening (If Applicable):   Lab Results   Component Value Date/Time    COVID19 Not Detected 07/21/2022 04:20 PM           Anesthesia Evaluation  Patient summary reviewed no history of anesthetic complications:   Airway: Mallampati: II  TM distance: >3 FB   Neck ROM: full  Mouth opening: > = 3 FB   Dental: normal exam         Pulmonary:Negative Pulmonary ROS breath sounds clear to auscultation      (-) not a current smoker                           Cardiovascular:    (+) hypertension:, hyperlipidemia      ECG reviewed  Rhythm: regular  Rate: normal           Beta Blocker:  Not on Beta Blocker      ROS comment: ECG: Normal sinus rhythm  Prolonged QT  Abnormal ECG  No previous ECGs available        Reviewed:  As above.   Confirmed by Ceci Fitzgerald ([phone]) on 7/21/2022 10:14:56 PM     Neuro/Psych:   Negative Neuro/Psych ROS              GI/Hepatic/Renal:   (+) morbid obesity Endo/Other:    (+) : arthritis: OA., .    (-) blood dyscrasia                ROS comment: Closed displaced bimalleolar fracture of left lower leg Abdominal:             Vascular: negative vascular ROS. Other Findings:             Anesthesia Plan      general     ASA 3       Induction: intravenous. MIPS: Postoperative opioids intended and Prophylactic antiemetics administered. Anesthetic plan and risks discussed with patient. Plan discussed with CRNA. DOS STAFF ADDENDUM:    Pt seen and examined, chart reviewed (including anesthesia, drug and allergy history). Anesthetic plan, risks, benefits, alternatives, and personnel involved discussed with patient. Patient verbalized an understanding and agrees to proceed. Plan discussed with care team members and agreed upon.     Reny Freed MD  Staff Anesthesiologist  4:17 PM      Reny Freed MD   7/25/2022

## 2022-07-25 ENCOUNTER — HOSPITAL ENCOUNTER (OUTPATIENT)
Dept: GENERAL RADIOLOGY | Age: 72
Discharge: HOME | DRG: 493 | End: 2022-07-27
Payer: MEDICARE

## 2022-07-25 ENCOUNTER — APPOINTMENT (OUTPATIENT)
Dept: GENERAL RADIOLOGY | Age: 72
DRG: 493 | End: 2022-07-25
Attending: ORTHOPAEDIC SURGERY
Payer: MEDICARE

## 2022-07-25 ENCOUNTER — ANESTHESIA (OUTPATIENT)
Dept: OPERATING ROOM | Age: 72
DRG: 493 | End: 2022-07-25
Payer: MEDICARE

## 2022-07-25 ENCOUNTER — HOSPITAL ENCOUNTER (INPATIENT)
Age: 72
LOS: 1 days | Discharge: NURSING, REHAB OR LONG-TERM CARE | DRG: 493 | End: 2022-07-26
Attending: ORTHOPAEDIC SURGERY | Admitting: ORTHOPAEDIC SURGERY
Payer: MEDICARE

## 2022-07-25 DIAGNOSIS — S82.892A CLOSED FRACTURE OF LEFT ANKLE, INITIAL ENCOUNTER: ICD-10-CM

## 2022-07-25 PROBLEM — S82.852A LEFT TRIMALLEOLAR FRACTURE, CLOSED, INITIAL ENCOUNTER: Status: ACTIVE | Noted: 2022-07-25

## 2022-07-25 PROCEDURE — 6370000000 HC RX 637 (ALT 250 FOR IP)

## 2022-07-25 PROCEDURE — 7100000001 HC PACU RECOVERY - ADDTL 15 MIN: Performed by: ORTHOPAEDIC SURGERY

## 2022-07-25 PROCEDURE — 2500000003 HC RX 250 WO HCPCS: Performed by: NURSE ANESTHETIST, CERTIFIED REGISTERED

## 2022-07-25 PROCEDURE — 6370000000 HC RX 637 (ALT 250 FOR IP): Performed by: STUDENT IN AN ORGANIZED HEALTH CARE EDUCATION/TRAINING PROGRAM

## 2022-07-25 PROCEDURE — 0QSH04Z REPOSITION LEFT TIBIA WITH INTERNAL FIXATION DEVICE, OPEN APPROACH: ICD-10-PCS | Performed by: ORTHOPAEDIC SURGERY

## 2022-07-25 PROCEDURE — 2500000003 HC RX 250 WO HCPCS: Performed by: ANESTHESIOLOGY

## 2022-07-25 PROCEDURE — 3700000000 HC ANESTHESIA ATTENDED CARE: Performed by: ORTHOPAEDIC SURGERY

## 2022-07-25 PROCEDURE — 6360000002 HC RX W HCPCS: Performed by: NURSE ANESTHETIST, CERTIFIED REGISTERED

## 2022-07-25 PROCEDURE — 2580000003 HC RX 258: Performed by: ANESTHESIOLOGY

## 2022-07-25 PROCEDURE — 6360000002 HC RX W HCPCS: Performed by: ORTHOPAEDIC SURGERY

## 2022-07-25 PROCEDURE — 6370000000 HC RX 637 (ALT 250 FOR IP): Performed by: ANESTHESIOLOGY

## 2022-07-25 PROCEDURE — 73610 X-RAY EXAM OF ANKLE: CPT

## 2022-07-25 PROCEDURE — 7100000000 HC PACU RECOVERY - FIRST 15 MIN: Performed by: ORTHOPAEDIC SURGERY

## 2022-07-25 PROCEDURE — 3700000001 HC ADD 15 MINUTES (ANESTHESIA): Performed by: ORTHOPAEDIC SURGERY

## 2022-07-25 PROCEDURE — 3209999900 FLUORO FOR SURGICAL PROCEDURES

## 2022-07-25 PROCEDURE — 2720000010 HC SURG SUPPLY STERILE: Performed by: ORTHOPAEDIC SURGERY

## 2022-07-25 PROCEDURE — 0SPG04Z REMOVAL OF INTERNAL FIXATION DEVICE FROM LEFT ANKLE JOINT, OPEN APPROACH: ICD-10-PCS | Performed by: ORTHOPAEDIC SURGERY

## 2022-07-25 PROCEDURE — C1769 GUIDE WIRE: HCPCS | Performed by: ORTHOPAEDIC SURGERY

## 2022-07-25 PROCEDURE — 2580000003 HC RX 258: Performed by: ORTHOPAEDIC SURGERY

## 2022-07-25 PROCEDURE — C1713 ANCHOR/SCREW BN/BN,TIS/BN: HCPCS | Performed by: ORTHOPAEDIC SURGERY

## 2022-07-25 PROCEDURE — 2709999900 HC NON-CHARGEABLE SUPPLY: Performed by: ORTHOPAEDIC SURGERY

## 2022-07-25 PROCEDURE — 1200000000 HC SEMI PRIVATE

## 2022-07-25 PROCEDURE — 2700000000 HC OXYGEN THERAPY PER DAY

## 2022-07-25 PROCEDURE — 3600000004 HC SURGERY LEVEL 4 BASE: Performed by: ORTHOPAEDIC SURGERY

## 2022-07-25 PROCEDURE — 3600000014 HC SURGERY LEVEL 4 ADDTL 15MIN: Performed by: ORTHOPAEDIC SURGERY

## 2022-07-25 PROCEDURE — 0QSK04Z REPOSITION LEFT FIBULA WITH INTERNAL FIXATION DEVICE, OPEN APPROACH: ICD-10-PCS | Performed by: ORTHOPAEDIC SURGERY

## 2022-07-25 PROCEDURE — 2580000003 HC RX 258: Performed by: STUDENT IN AN ORGANIZED HEALTH CARE EDUCATION/TRAINING PROGRAM

## 2022-07-25 DEVICE — PLATE BNE L112MM 6 H L DST LAT FIBULAR S STL LOK COMPR FOR: Type: IMPLANTABLE DEVICE | Site: ANKLE | Status: FUNCTIONAL

## 2022-07-25 DEVICE — SCREW BNE L45MM DIA3.5MM CORT S STL ST NONCANNULATED LOK: Type: IMPLANTABLE DEVICE | Site: ANKLE | Status: FUNCTIONAL

## 2022-07-25 DEVICE — SCREW BNE L12MM DIA3.5MM CORT S STL ST NONCANNULATED LOK: Type: IMPLANTABLE DEVICE | Site: ANKLE | Status: FUNCTIONAL

## 2022-07-25 DEVICE — SCREW BNE L14MM DIA3.5MM CORT S STL ST NONCANNULATED LOK: Type: IMPLANTABLE DEVICE | Site: ANKLE | Status: FUNCTIONAL

## 2022-07-25 DEVICE — SCREW BNE L46MM DIA4MM S STL CANN LNG HALF THRD SM HEX SOCK: Type: IMPLANTABLE DEVICE | Site: ANKLE | Status: FUNCTIONAL

## 2022-07-25 DEVICE — SCREW BNE L16MM DIA2.7MM CORT S STL ST LOK FULL THRD T8: Type: IMPLANTABLE DEVICE | Site: ANKLE | Status: FUNCTIONAL

## 2022-07-25 DEVICE — SCREW BNE L14MM DIA2.7MM CORT S STL ST LOK FULL THRD T8: Type: IMPLANTABLE DEVICE | Site: ANKLE | Status: FUNCTIONAL

## 2022-07-25 DEVICE — SCREW BNE L14MM DIA3.5MM CORT S STL ST LOK FULL THRD: Type: IMPLANTABLE DEVICE | Site: ANKLE | Status: FUNCTIONAL

## 2022-07-25 RX ORDER — PROCHLORPERAZINE EDISYLATE 5 MG/ML
10 INJECTION INTRAMUSCULAR; INTRAVENOUS EVERY 6 HOURS PRN
Status: DISCONTINUED | OUTPATIENT
Start: 2022-07-25 | End: 2022-07-26 | Stop reason: HOSPADM

## 2022-07-25 RX ORDER — DIPHENHYDRAMINE HYDROCHLORIDE 50 MG/ML
12.5 INJECTION INTRAMUSCULAR; INTRAVENOUS
Status: DISCONTINUED | OUTPATIENT
Start: 2022-07-25 | End: 2022-07-25 | Stop reason: HOSPADM

## 2022-07-25 RX ORDER — PROPOFOL 10 MG/ML
INJECTION, EMULSION INTRAVENOUS PRN
Status: DISCONTINUED | OUTPATIENT
Start: 2022-07-25 | End: 2022-07-25 | Stop reason: SDUPTHER

## 2022-07-25 RX ORDER — DEXAMETHASONE SODIUM PHOSPHATE 10 MG/ML
INJECTION, SOLUTION INTRAMUSCULAR; INTRAVENOUS PRN
Status: DISCONTINUED | OUTPATIENT
Start: 2022-07-25 | End: 2022-07-25 | Stop reason: SDUPTHER

## 2022-07-25 RX ORDER — ACETAMINOPHEN 325 MG/1
650 TABLET ORAL EVERY 6 HOURS
Status: DISCONTINUED | OUTPATIENT
Start: 2022-07-25 | End: 2022-07-26 | Stop reason: HOSPADM

## 2022-07-25 RX ORDER — VANCOMYCIN HYDROCHLORIDE 1 G/20ML
INJECTION, POWDER, LYOPHILIZED, FOR SOLUTION INTRAVENOUS PRN
Status: DISCONTINUED | OUTPATIENT
Start: 2022-07-25 | End: 2022-07-25 | Stop reason: ALTCHOICE

## 2022-07-25 RX ORDER — LABETALOL HYDROCHLORIDE 5 MG/ML
10 INJECTION, SOLUTION INTRAVENOUS
Status: DISCONTINUED | OUTPATIENT
Start: 2022-07-25 | End: 2022-07-25 | Stop reason: HOSPADM

## 2022-07-25 RX ORDER — FAMOTIDINE 10 MG/ML
20 INJECTION, SOLUTION INTRAVENOUS ONCE
Status: COMPLETED | OUTPATIENT
Start: 2022-07-25 | End: 2022-07-25

## 2022-07-25 RX ORDER — MORPHINE SULFATE 4 MG/ML
4 INJECTION, SOLUTION INTRAMUSCULAR; INTRAVENOUS
Status: DISCONTINUED | OUTPATIENT
Start: 2022-07-25 | End: 2022-07-25 | Stop reason: DRUGHIGH

## 2022-07-25 RX ORDER — SODIUM CHLORIDE 0.9 % (FLUSH) 0.9 %
5-40 SYRINGE (ML) INJECTION PRN
Status: DISCONTINUED | OUTPATIENT
Start: 2022-07-25 | End: 2022-07-26 | Stop reason: HOSPADM

## 2022-07-25 RX ORDER — SODIUM CHLORIDE, SODIUM LACTATE, POTASSIUM CHLORIDE, CALCIUM CHLORIDE 600; 310; 30; 20 MG/100ML; MG/100ML; MG/100ML; MG/100ML
INJECTION, SOLUTION INTRAVENOUS CONTINUOUS
Status: DISCONTINUED | OUTPATIENT
Start: 2022-07-25 | End: 2022-07-25 | Stop reason: HOSPADM

## 2022-07-25 RX ORDER — SODIUM CHLORIDE 9 MG/ML
INJECTION, SOLUTION INTRAVENOUS CONTINUOUS
Status: DISCONTINUED | OUTPATIENT
Start: 2022-07-25 | End: 2022-07-26 | Stop reason: HOSPADM

## 2022-07-25 RX ORDER — SODIUM CHLORIDE 9 MG/ML
INJECTION, SOLUTION INTRAVENOUS PRN
Status: DISCONTINUED | OUTPATIENT
Start: 2022-07-25 | End: 2022-07-26 | Stop reason: HOSPADM

## 2022-07-25 RX ORDER — METOPROLOL TARTRATE 5 MG/5ML
INJECTION INTRAVENOUS PRN
Status: DISCONTINUED | OUTPATIENT
Start: 2022-07-25 | End: 2022-07-25 | Stop reason: SDUPTHER

## 2022-07-25 RX ORDER — ONDANSETRON 4 MG/1
4 TABLET, ORALLY DISINTEGRATING ORAL EVERY 8 HOURS PRN
Status: DISCONTINUED | OUTPATIENT
Start: 2022-07-25 | End: 2022-07-25 | Stop reason: ALTCHOICE

## 2022-07-25 RX ORDER — ONDANSETRON 2 MG/ML
4 INJECTION INTRAMUSCULAR; INTRAVENOUS EVERY 6 HOURS PRN
Status: DISCONTINUED | OUTPATIENT
Start: 2022-07-25 | End: 2022-07-25 | Stop reason: ALTCHOICE

## 2022-07-25 RX ORDER — ONDANSETRON 2 MG/ML
4 INJECTION INTRAMUSCULAR; INTRAVENOUS
Status: DISCONTINUED | OUTPATIENT
Start: 2022-07-25 | End: 2022-07-25 | Stop reason: HOSPADM

## 2022-07-25 RX ORDER — PROCHLORPERAZINE EDISYLATE 5 MG/ML
5 INJECTION INTRAMUSCULAR; INTRAVENOUS
Status: DISCONTINUED | OUTPATIENT
Start: 2022-07-25 | End: 2022-07-25 | Stop reason: HOSPADM

## 2022-07-25 RX ORDER — LORAZEPAM 2 MG/ML
0.5 INJECTION INTRAMUSCULAR
Status: DISCONTINUED | OUTPATIENT
Start: 2022-07-25 | End: 2022-07-25 | Stop reason: HOSPADM

## 2022-07-25 RX ORDER — SODIUM CHLORIDE 0.9 % (FLUSH) 0.9 %
5-40 SYRINGE (ML) INJECTION EVERY 12 HOURS SCHEDULED
Status: DISCONTINUED | OUTPATIENT
Start: 2022-07-25 | End: 2022-07-26 | Stop reason: HOSPADM

## 2022-07-25 RX ORDER — AMLODIPINE BESYLATE 10 MG/1
10 TABLET ORAL DAILY
Status: DISCONTINUED | OUTPATIENT
Start: 2022-07-26 | End: 2022-07-26 | Stop reason: HOSPADM

## 2022-07-25 RX ORDER — MORPHINE SULFATE 2 MG/ML
2 INJECTION, SOLUTION INTRAMUSCULAR; INTRAVENOUS EVERY 5 MIN PRN
Status: DISCONTINUED | OUTPATIENT
Start: 2022-07-25 | End: 2022-07-25 | Stop reason: HOSPADM

## 2022-07-25 RX ORDER — MIDAZOLAM HYDROCHLORIDE 1 MG/ML
INJECTION INTRAMUSCULAR; INTRAVENOUS PRN
Status: DISCONTINUED | OUTPATIENT
Start: 2022-07-25 | End: 2022-07-25 | Stop reason: SDUPTHER

## 2022-07-25 RX ORDER — IPRATROPIUM BROMIDE AND ALBUTEROL SULFATE 2.5; .5 MG/3ML; MG/3ML
1 SOLUTION RESPIRATORY (INHALATION)
Status: COMPLETED | OUTPATIENT
Start: 2022-07-25 | End: 2022-07-25

## 2022-07-25 RX ORDER — ONDANSETRON 2 MG/ML
INJECTION INTRAMUSCULAR; INTRAVENOUS PRN
Status: DISCONTINUED | OUTPATIENT
Start: 2022-07-25 | End: 2022-07-25 | Stop reason: SDUPTHER

## 2022-07-25 RX ORDER — FENTANYL CITRATE 50 UG/ML
INJECTION, SOLUTION INTRAMUSCULAR; INTRAVENOUS PRN
Status: DISCONTINUED | OUTPATIENT
Start: 2022-07-25 | End: 2022-07-25 | Stop reason: SDUPTHER

## 2022-07-25 RX ORDER — SCOLOPAMINE TRANSDERMAL SYSTEM 1 MG/1
1 PATCH, EXTENDED RELEASE TRANSDERMAL
Status: DISCONTINUED | OUTPATIENT
Start: 2022-07-25 | End: 2022-07-26 | Stop reason: HOSPADM

## 2022-07-25 RX ORDER — KETOROLAC TROMETHAMINE 30 MG/ML
INJECTION, SOLUTION INTRAMUSCULAR; INTRAVENOUS PRN
Status: DISCONTINUED | OUTPATIENT
Start: 2022-07-25 | End: 2022-07-25 | Stop reason: SDUPTHER

## 2022-07-25 RX ORDER — MORPHINE SULFATE 2 MG/ML
2 INJECTION, SOLUTION INTRAMUSCULAR; INTRAVENOUS
Status: DISCONTINUED | OUTPATIENT
Start: 2022-07-25 | End: 2022-07-25 | Stop reason: DRUGHIGH

## 2022-07-25 RX ORDER — MORPHINE SULFATE 4 MG/ML
4 INJECTION, SOLUTION INTRAMUSCULAR; INTRAVENOUS EVERY 4 HOURS PRN
Status: DISCONTINUED | OUTPATIENT
Start: 2022-07-25 | End: 2022-07-26 | Stop reason: HOSPADM

## 2022-07-25 RX ORDER — OXYCODONE HYDROCHLORIDE 5 MG/1
10 TABLET ORAL EVERY 4 HOURS PRN
Status: DISCONTINUED | OUTPATIENT
Start: 2022-07-25 | End: 2022-07-26 | Stop reason: HOSPADM

## 2022-07-25 RX ORDER — OXYCODONE HYDROCHLORIDE 5 MG/1
5 TABLET ORAL EVERY 4 HOURS PRN
Status: DISCONTINUED | OUTPATIENT
Start: 2022-07-25 | End: 2022-07-26 | Stop reason: HOSPADM

## 2022-07-25 RX ORDER — IPRATROPIUM BROMIDE AND ALBUTEROL SULFATE 2.5; .5 MG/3ML; MG/3ML
SOLUTION RESPIRATORY (INHALATION)
Status: COMPLETED
Start: 2022-07-25 | End: 2022-07-25

## 2022-07-25 RX ORDER — MORPHINE SULFATE 2 MG/ML
2 INJECTION, SOLUTION INTRAMUSCULAR; INTRAVENOUS EVERY 4 HOURS PRN
Status: DISCONTINUED | OUTPATIENT
Start: 2022-07-25 | End: 2022-07-26 | Stop reason: HOSPADM

## 2022-07-25 RX ORDER — POTASSIUM CHLORIDE 750 MG/1
10 TABLET, EXTENDED RELEASE ORAL 2 TIMES DAILY
Status: DISCONTINUED | OUTPATIENT
Start: 2022-07-25 | End: 2022-07-26 | Stop reason: HOSPADM

## 2022-07-25 RX ORDER — HYDRALAZINE HYDROCHLORIDE 20 MG/ML
10 INJECTION INTRAMUSCULAR; INTRAVENOUS
Status: DISCONTINUED | OUTPATIENT
Start: 2022-07-25 | End: 2022-07-25 | Stop reason: HOSPADM

## 2022-07-25 RX ORDER — TRIAMTERENE AND HYDROCHLOROTHIAZIDE 37.5; 25 MG/1; MG/1
1 TABLET ORAL EVERY MORNING
Status: DISCONTINUED | OUTPATIENT
Start: 2022-07-26 | End: 2022-07-26 | Stop reason: HOSPADM

## 2022-07-25 RX ORDER — MORPHINE SULFATE 10 MG/ML
INJECTION, SOLUTION INTRAMUSCULAR; INTRAVENOUS PRN
Status: DISCONTINUED | OUTPATIENT
Start: 2022-07-25 | End: 2022-07-25 | Stop reason: SDUPTHER

## 2022-07-25 RX ADMIN — METOPROLOL TARTRATE 2 MG: 5 INJECTION, SOLUTION INTRAVENOUS at 17:20

## 2022-07-25 RX ADMIN — ONDANSETRON 4 MG: 2 INJECTION INTRAMUSCULAR; INTRAVENOUS at 17:54

## 2022-07-25 RX ADMIN — FENTANYL CITRATE 50 MCG: 50 INJECTION, SOLUTION INTRAMUSCULAR; INTRAVENOUS at 16:47

## 2022-07-25 RX ADMIN — POTASSIUM CHLORIDE 10 MEQ: 750 TABLET, EXTENDED RELEASE ORAL at 22:39

## 2022-07-25 RX ADMIN — SODIUM CHLORIDE, POTASSIUM CHLORIDE, SODIUM LACTATE AND CALCIUM CHLORIDE: 600; 310; 30; 20 INJECTION, SOLUTION INTRAVENOUS at 12:28

## 2022-07-25 RX ADMIN — FENTANYL CITRATE 50 MCG: 50 INJECTION, SOLUTION INTRAMUSCULAR; INTRAVENOUS at 17:13

## 2022-07-25 RX ADMIN — LABETALOL HYDROCHLORIDE 10 MG: 5 INJECTION INTRAVENOUS at 20:21

## 2022-07-25 RX ADMIN — MORPHINE SULFATE 2 MG: 10 INJECTION INTRAMUSCULAR; INTRAVENOUS; SUBCUTANEOUS at 18:46

## 2022-07-25 RX ADMIN — CEFAZOLIN SODIUM 3000 MG: 10 INJECTION, POWDER, FOR SOLUTION INTRAVENOUS at 17:01

## 2022-07-25 RX ADMIN — FENTANYL CITRATE 50 MCG: 50 INJECTION, SOLUTION INTRAMUSCULAR; INTRAVENOUS at 16:56

## 2022-07-25 RX ADMIN — MORPHINE SULFATE 4 MG: 10 INJECTION INTRAMUSCULAR; INTRAVENOUS; SUBCUTANEOUS at 18:19

## 2022-07-25 RX ADMIN — MIDAZOLAM 2 MG: 1 INJECTION INTRAMUSCULAR; INTRAVENOUS at 16:52

## 2022-07-25 RX ADMIN — KETOROLAC TROMETHAMINE 30 MG: 30 INJECTION, SOLUTION INTRAMUSCULAR at 17:54

## 2022-07-25 RX ADMIN — SODIUM CHLORIDE: 9 INJECTION, SOLUTION INTRAVENOUS at 22:47

## 2022-07-25 RX ADMIN — IPRATROPIUM BROMIDE AND ALBUTEROL SULFATE 1 AMPULE: 2.5; .5 SOLUTION RESPIRATORY (INHALATION) at 19:57

## 2022-07-25 RX ADMIN — SODIUM CHLORIDE, POTASSIUM CHLORIDE, SODIUM LACTATE AND CALCIUM CHLORIDE: 600; 310; 30; 20 INJECTION, SOLUTION INTRAVENOUS at 17:40

## 2022-07-25 RX ADMIN — PROPOFOL 200 MG: 10 INJECTION, EMULSION INTRAVENOUS at 16:53

## 2022-07-25 RX ADMIN — DEXAMETHASONE SODIUM PHOSPHATE 10 MG: 10 INJECTION, SOLUTION INTRAMUSCULAR; INTRAVENOUS at 17:54

## 2022-07-25 RX ADMIN — METOPROLOL TARTRATE 3 MG: 5 INJECTION, SOLUTION INTRAVENOUS at 16:54

## 2022-07-25 RX ADMIN — FAMOTIDINE 20 MG: 10 INJECTION, SOLUTION INTRAVENOUS at 15:25

## 2022-07-25 RX ADMIN — ACETAMINOPHEN 650 MG: 325 TABLET ORAL at 22:39

## 2022-07-25 RX ADMIN — MORPHINE SULFATE 4 MG: 10 INJECTION INTRAMUSCULAR; INTRAVENOUS; SUBCUTANEOUS at 18:26

## 2022-07-25 RX ADMIN — SODIUM CHLORIDE, PRESERVATIVE FREE 10 ML: 5 INJECTION INTRAVENOUS at 22:39

## 2022-07-25 RX ADMIN — SODIUM CHLORIDE, POTASSIUM CHLORIDE, SODIUM LACTATE AND CALCIUM CHLORIDE: 600; 310; 30; 20 INJECTION, SOLUTION INTRAVENOUS at 18:37

## 2022-07-25 RX ADMIN — FENTANYL CITRATE 50 MCG: 50 INJECTION, SOLUTION INTRAMUSCULAR; INTRAVENOUS at 17:52

## 2022-07-25 RX ADMIN — FENTANYL CITRATE 50 MCG: 50 INJECTION, SOLUTION INTRAMUSCULAR; INTRAVENOUS at 17:21

## 2022-07-25 ASSESSMENT — PAIN DESCRIPTION - LOCATION: LOCATION: LEG

## 2022-07-25 ASSESSMENT — PAIN SCALES - GENERAL
PAINLEVEL_OUTOF10: 4
PAINLEVEL_OUTOF10: 0
PAINLEVEL_OUTOF10: 0

## 2022-07-25 ASSESSMENT — LIFESTYLE VARIABLES
HOW OFTEN DO YOU HAVE A DRINK CONTAINING ALCOHOL: NEVER
SMOKING_STATUS: 0
HOW MANY STANDARD DRINKS CONTAINING ALCOHOL DO YOU HAVE ON A TYPICAL DAY: PATIENT DOES NOT DRINK

## 2022-07-25 ASSESSMENT — PAIN DESCRIPTION - DESCRIPTORS: DESCRIPTORS: ACHING;DISCOMFORT

## 2022-07-25 ASSESSMENT — PAIN DESCRIPTION - ORIENTATION: ORIENTATION: LEFT

## 2022-07-25 NOTE — BRIEF OP NOTE
Brief Postoperative Note      Patient: Kimber Worthington  YOB: 1950  MRN: [id number]    Date of Procedure: 7/25/2022    Pre-Op Diagnosis: Trimalleolar fracture, left, closed, initial encounter [X05.025O]    Post-Op Diagnosis: Same and syndesmotic disruption        Procedure(s):  LEFT ANKLE OPEN REDUCTION INTERNAL FIXATION **DO NOT CHANGE TIME**    Surgeon(s): Osbaldo Fletcher DO    Assistant:  First Assistant: Jerome Reed RN  Resident: Lorena Krishna DO; Janelle Harman DO    Anesthesia: General    Estimated Blood Loss (mL): 50    Complications: None    Specimens:   * No specimens in log *    Implants:  Implant Name Type Inv.  Item Serial No.  Lot No. LRB No. Used Action   PLATE BNE [id number] 6 H L DST LAT FIBULAR S STL JOSIE COMPR FOR - [id number]  PLATE BNE [id number] 6 H L DST LAT FIBULAR S STL JOSIE COMPR FOR  [employer]PU[employer]-  Left 1 Implanted   SCREW BNE L14MM DIA2.7MM STAN S STL ST JOSIE FULL THRD T8 - [id number]  SCREW BNE L14MM DIA2.7MM STAN S STL ST JOSIE FULL THRD T8  DEPU[employer] [place]-WD  Left 4 Implanted   SCREW BNE L16MM DIA2.7MM STAN S STL ST JOSIE FULL THRD T8 - [id number]  SCREW BNE L16MM DIA2.7MM STAN S STL ST JOSIE FULL THRD T8  DEPU[employer] [place]-WD  Left 1 Implanted   SCREW BNE L12MM DIA3.5MM STAN S STL ST NONCANNULATED JOSIE - [id number]  SCREW BNE L12MM DIA3.5MM STAN S STL ST NONCANNULATED JOSIE  DEPU[employer] [place]-WD  Left 1 Explanted   SCREW BNE L14MM DIA3.5MM STAN S STL ST NONCANNULATED JOSIE - [id number]  SCREW BNE L14MM DIA3.5MM STAN S STL ST NONCANNULATED JOSIE  DEPU[employer] [place]-WD  Left 1 Implanted   SCREW BNE L14MM DIA3.5MM STAN S STL ST JOSIE FULL THRD - [id number]  SCREW BNE L14MM DIA3.5MM STAN S STL ST JOSIE FULL THRD  DEPU[employer] [place]-WD  Left 1 Implanted   SCREW BNE L12MM DIA3.5MM STAN S STL ST NONCANNULATED JOSIE - [id number]  SCREW BNE L12MM DIA3.5MM STAN S STL ST NONCANNULATED JOSIE  DEPU[employer]-  Left 3 Implanted   SCREW BNE L46MM DIA4MM S STL KEYSHA LNG HALF THRD SM HEX SOCK - [id number]  SCREW BNE L46MM DIA4MM S STL KEYSHA LNG HALF THRD SM HEX SOCK  DEPU[employer] [place]-WD  Left 1 Implanted   SCREW BNE L45MM DIA3.5MM STAN S STL ST NONCANNULATED JOSIE - [id number]  SCREW BNE L45MM DIA3.5MM STAN S STL ST NONCANNULATED JOSIE  DEPU[employer] [place]-WD  Left 1 Implanted         Drains:   [REMOVED] Urinary Catheter [place] (Removed)   $ Urethral catheter insertion $ Not inserted for procedure 07/20/22 1513   Catheter Indications Perioperative use for selected surgical procedures 07/21/22 2000   Site Assessment Pink 07/20/22 1513   Urine Color Yellow 07/20/22 2000   Urine Appearance Clear 07/21/22 2000   Collection Container Standard 07/20/22 2000   Status Draining;Patent 07/21/22 2000   Output (mL) 650 mL 07/21/22 2243       Findings: see report      Electronically signed by Nelly Carbone DO on 7/25/2022 at 7:01 PM

## 2022-07-25 NOTE — OP NOTE
S STL KEYSHA LNG HALF THRD SM HEX SOCK - [id number]  SCREW BNE L46MM DIA4MM S STL KEYSHA LNG HALF THRD SM HEX SOCK  DEPU[employer] [place]-WD  Left 1 Implanted   SCREW BNE L45MM DIA3.5MM STAN S STL ST NONCANNULATED JOSIE - [id number]  SCREW BNE L45MM DIA3.5MM STAN S STL ST NONCANNULATED JOSIE  DEPU[employer] [place]-WD  Left 1 Implanted         Drains:   [REMOVED] Urinary Catheter [place] (Removed)   $ Urethral catheter insertion $ Not inserted for procedure 07/20/22 1513   Catheter Indications Perioperative use for selected surgical procedures 07/21/22 2000   Site Assessment Pink 07/20/22 1513   Urine Color Yellow 07/20/22 2000   Urine Appearance Clear 07/21/22 2000   Collection Container Standard 07/20/22 2000   Status Draining;Patent 07/21/22 2000   Output (mL) 650 mL 07/21/22 9593       Findings: ***    Detailed Description of Procedure:   ***    Electronically signed by Willy Cesar DO on 7/25/2022 at 7:02 PM

## 2022-07-25 NOTE — PROGRESS NOTES
7/25/22 1300 pt would like to stay overnight. Per nursing home and  the nursing home can only take pt back to nursing home by 3:30p and [facility] only can take pt back by 6p with charge to pt. Dr would need medical reason for pt to use ambulance to return to nursing home this evening. reviewed options with pt and pt would like to stay overnight in hospital if possible d/t \"I always get nauseated.  Kmo rn

## 2022-07-25 NOTE — CARE COORDINATION
7/25/2022: SS Note/Discharge plan:  SS Consult received for transportation back to [facility] post-op, per [facility] [address] and facility liaison,Alyce pt's surgery was delayed and pt will not be ready for discharge now until 7-8pm, the facility w/c Divina Arnold brought pt in but only run until 3:30pm and will not be able to take pt back this evening, if pt says overnight Observation their [facility] transport her back in the morning, pt's son is present but unable to take pt by private car and is concerned that at ambulance will not be covered. Contacted Chris katz, ph # [phone], cost is $69.87 and would need family to provide a credit card prior to trip, they only run until 6:00pm, [facility] notified. Electronically signed by MINAL Baldwin on 7/25/2022 at 12:57 PM

## 2022-07-25 NOTE — H&P
I have reviewed the history and physical and examined the patient and find no relevant changes. I have reviewed with the patient and/or family the risks, benefits, and alternatives to the procedure. The patient was counseled at length about the risks of michael Covid-19 during their perioperative period and any recovery window from their procedure. The patient was made aware that michael Covid-19  may worsen their prognosis for recovering from their procedure  and lend to a higher morbidity and/or mortality risk. All material risks, benefits, and reasonable alternatives including postponing the procedure were discussed. The patient does wish to proceed with the procedure at this time.         Osbaldo Fletcher DO  7/25/2022

## 2022-07-26 VITALS
TEMPERATURE: 98.2 F | HEIGHT: 63 IN | HEART RATE: 73 BPM | RESPIRATION RATE: 20 BRPM | WEIGHT: 234 LBS | BODY MASS INDEX: 41.46 KG/M2 | SYSTOLIC BLOOD PRESSURE: 148 MMHG | OXYGEN SATURATION: 95 % | DIASTOLIC BLOOD PRESSURE: 62 MMHG

## 2022-07-26 LAB
ANION GAP SERPL CALCULATED.3IONS-SCNC: 9 MMOL/L (ref 7–16)
BUN BLDV-MCNC: 13 MG/DL (ref 6–23)
CALCIUM SERPL-MCNC: 8.6 MG/DL (ref 8.6–10.2)
CHLORIDE BLD-SCNC: 106 MMOL/L (ref 98–107)
CO2: 26 MMOL/L (ref 22–29)
CREAT SERPL-MCNC: 0.8 MG/DL (ref 0.5–1)
GFR AFRICAN AMERICAN: >60
GFR NON-AFRICAN AMERICAN: >60 ML/MIN/1.73
GLUCOSE BLD-MCNC: 150 MG/DL (ref 74–99)
HCT VFR BLD CALC: 37.8 % (ref 34–48)
HEMOGLOBIN: 11.9 G/DL (ref 11.5–15.5)
MCH RBC QN AUTO: 31.6 PG (ref 26–35)
MCHC RBC AUTO-ENTMCNC: 31.5 % (ref 32–34.5)
MCV RBC AUTO: 100.3 FL (ref 80–99.9)
PDW BLD-RTO: 12.4 FL (ref 11.5–15)
PLATELET # BLD: 303 E9/L (ref 130–450)
PMV BLD AUTO: 10.8 FL (ref 7–12)
POTASSIUM REFLEX MAGNESIUM: 4.1 MMOL/L (ref 3.5–5)
RBC # BLD: 3.77 E12/L (ref 3.5–5.5)
SARS-COV-2, NAAT: NOT DETECTED
SODIUM BLD-SCNC: 141 MMOL/L (ref 132–146)
WBC # BLD: 13.3 E9/L (ref 4.5–11.5)

## 2022-07-26 PROCEDURE — 97165 OT EVAL LOW COMPLEX 30 MIN: CPT

## 2022-07-26 PROCEDURE — 85027 COMPLETE CBC AUTOMATED: CPT

## 2022-07-26 PROCEDURE — 97530 THERAPEUTIC ACTIVITIES: CPT

## 2022-07-26 PROCEDURE — 20694 RMVL EXT FIXJ SYS UNDER ANES: CPT | Performed by: ORTHOPAEDIC SURGERY

## 2022-07-26 PROCEDURE — 87635 SARS-COV-2 COVID-19 AMP PRB: CPT

## 2022-07-26 PROCEDURE — 27829 TREAT LOWER LEG JOINT: CPT | Performed by: ORTHOPAEDIC SURGERY

## 2022-07-26 PROCEDURE — 36415 COLL VENOUS BLD VENIPUNCTURE: CPT

## 2022-07-26 PROCEDURE — 97535 SELF CARE MNGMENT TRAINING: CPT

## 2022-07-26 PROCEDURE — 2700000000 HC OXYGEN THERAPY PER DAY

## 2022-07-26 PROCEDURE — 97530 THERAPEUTIC ACTIVITIES: CPT | Performed by: PHYSICAL THERAPIST

## 2022-07-26 PROCEDURE — 6360000002 HC RX W HCPCS: Performed by: STUDENT IN AN ORGANIZED HEALTH CARE EDUCATION/TRAINING PROGRAM

## 2022-07-26 PROCEDURE — 80048 BASIC METABOLIC PNL TOTAL CA: CPT

## 2022-07-26 PROCEDURE — 97110 THERAPEUTIC EXERCISES: CPT | Performed by: PHYSICAL THERAPIST

## 2022-07-26 PROCEDURE — 6370000000 HC RX 637 (ALT 250 FOR IP): Performed by: STUDENT IN AN ORGANIZED HEALTH CARE EDUCATION/TRAINING PROGRAM

## 2022-07-26 PROCEDURE — 27822 TREATMENT OF ANKLE FRACTURE: CPT | Performed by: ORTHOPAEDIC SURGERY

## 2022-07-26 PROCEDURE — 97161 PT EVAL LOW COMPLEX 20 MIN: CPT | Performed by: PHYSICAL THERAPIST

## 2022-07-26 PROCEDURE — 2580000003 HC RX 258: Performed by: STUDENT IN AN ORGANIZED HEALTH CARE EDUCATION/TRAINING PROGRAM

## 2022-07-26 RX ADMIN — OXYCODONE 5 MG: 5 TABLET ORAL at 01:43

## 2022-07-26 RX ADMIN — OXYCODONE 5 MG: 5 TABLET ORAL at 09:04

## 2022-07-26 RX ADMIN — CEFAZOLIN 2000 MG: 2 INJECTION, POWDER, FOR SOLUTION INTRAMUSCULAR; INTRAVENOUS at 00:50

## 2022-07-26 RX ADMIN — OXYCODONE 10 MG: 5 TABLET ORAL at 13:31

## 2022-07-26 RX ADMIN — ACETAMINOPHEN 650 MG: 325 TABLET ORAL at 05:06

## 2022-07-26 RX ADMIN — ASPIRIN 325 MG: 325 TABLET, COATED ORAL at 09:03

## 2022-07-26 RX ADMIN — TRIAMTERENE AND HYDROCHLOROTHIAZIDE 1 TABLET: 37.5; 25 TABLET ORAL at 09:03

## 2022-07-26 RX ADMIN — ACETAMINOPHEN 650 MG: 325 TABLET ORAL at 09:08

## 2022-07-26 RX ADMIN — POTASSIUM CHLORIDE 10 MEQ: 750 TABLET, EXTENDED RELEASE ORAL at 09:04

## 2022-07-26 RX ADMIN — AMLODIPINE BESYLATE 10 MG: 10 TABLET ORAL at 09:04

## 2022-07-26 RX ADMIN — CEFAZOLIN 2000 MG: 2 INJECTION, POWDER, FOR SOLUTION INTRAMUSCULAR; INTRAVENOUS at 09:02

## 2022-07-26 RX ADMIN — BISACODYL 5 MG: 5 TABLET, COATED ORAL at 09:03

## 2022-07-26 ASSESSMENT — PAIN DESCRIPTION - FREQUENCY: FREQUENCY: INTERMITTENT

## 2022-07-26 ASSESSMENT — PAIN DESCRIPTION - ORIENTATION
ORIENTATION: LEFT

## 2022-07-26 ASSESSMENT — PAIN - FUNCTIONAL ASSESSMENT
PAIN_FUNCTIONAL_ASSESSMENT: PREVENTS OR INTERFERES SOME ACTIVE ACTIVITIES AND ADLS

## 2022-07-26 ASSESSMENT — PAIN SCALES - GENERAL
PAINLEVEL_OUTOF10: 8
PAINLEVEL_OUTOF10: 4
PAINLEVEL_OUTOF10: 3
PAINLEVEL_OUTOF10: 6
PAINLEVEL_OUTOF10: 1
PAINLEVEL_OUTOF10: 1
PAINLEVEL_OUTOF10: 6

## 2022-07-26 ASSESSMENT — PAIN DESCRIPTION - DESCRIPTORS
DESCRIPTORS: DISCOMFORT
DESCRIPTORS: ACHING
DESCRIPTORS: ACHING;DISCOMFORT;DULL
DESCRIPTORS: ACHING;DULL
DESCRIPTORS: ACHING;DULL;STABBING

## 2022-07-26 ASSESSMENT — PAIN DESCRIPTION - LOCATION
LOCATION: ANKLE
LOCATION: LEG

## 2022-07-26 ASSESSMENT — PAIN DESCRIPTION - PAIN TYPE: TYPE: SURGICAL PAIN

## 2022-07-26 NOTE — PLAN OF CARE
Problem: Discharge Planning  Goal: Discharge to home or other facility with appropriate resources  Outcome: Progressing Towards Goal  Flowsheets (Taken 7/26/2022 0426)  Discharge to home or other facility with appropriate resources: Identify barriers to discharge with patient and caregiver     Problem: Pain  Goal: Verbalizes/displays adequate comfort level or baseline comfort level  Outcome: Progressing Towards Goal     Problem: Skin/Tissue Integrity  Goal: Absence of new skin breakdown  Description: 1. Monitor for areas of redness and/or skin breakdown  2. Assess vascular access sites hourly  3. Every 4-6 hours minimum:  Change oxygen saturation probe site  4. Every 4-6 hours:  If on nasal continuous positive airway pressure, respiratory therapy assess nares and determine need for appliance change or resting period.   Outcome: Progressing Towards Goal     Problem: ABCDS Injury Assessment  Goal: Absence of physical injury  Outcome: Progressing Towards Goal

## 2022-07-26 NOTE — DISCHARGE INSTR - COC
Continuity of Care Form    Patient Name: Carlton Keita   :  1950  MRN:  [id number]    Admit date:  2022  Discharge date:  22    Code Status Order: Full Code   Advance Directives:     Admitting Physician:  Marianela Ortega DO  PCP: Gustavo Majano MD    Discharging Nurse: [facility] Unit/Room#: 0316/0316-01  Discharging Unit Phone Number: [phone]    Emergency Contact:   Extended Emergency Contact Information  Primary Emergency Contact: Eleazar Castillo  Address: [address] [place] [address], [address] Phone: [phone]  Work Phone: [phone]  Mobile Phone: [phone]  Relation: Other   needed?  No  Secondary Emergency Contact: Fernanda Saldaña  Home Phone: [phone]  Relation: Child    Past Surgical History:  Past Surgical History:   Procedure Laterality Date    ANKLE FRACTURE SURGERY Left 2022    LEFT ANKLE EXTERNAL FIXATOR performed by Marianela Ortega DO at [address] [place] [address] 2022    LEFT ANKLE OPEN REDUCTION INTERNAL FIXATION **DO NOT CHANGE TIME** performed by Marianela Ortega DO at [place] [address] (CERVIX STATUS UNKNOWN)         Immunization History:   Immunization History   Administered Date(s) Administered    COVID-19, PFIZER GRAY top, DO NOT Dilute, (age 15 y+), IM, 30 mcg/0.3 mL 2022    COVID-19, PFIZER PURPLE top, DILUTE for use, (age 15 y+), 30mcg/0.3mL 2021, 2021, 10/13/2021       Active Problems:  Patient Active Problem List   Diagnosis Code    Closed bimalleolar fracture of left ankle S82.842A    Tibia/fibula fracture S82.209A, S82.409A    Left trimalleolar fracture, closed, initial encounter S82.852A       Isolation/Infection:   Isolation            No Isolation          Patient Infection Status       None to display            Nurse Assessment:  Last Vital Signs: BP (!) 148/62   Pulse 73   Temp 98.2 °F (36.8 °C) (Oral)   Resp 20   Ht 5' 3\" (1.6 m)   Wt 234 lb (106.1 kg) SpO2 95%   BMI 41.45 kg/m²     Last documented pain score (0-10 scale): Pain Level: 1  Last Weight:   Wt Readings from Last 1 Encounters:   07/25/22 234 lb (106.1 kg)     Mental Status:  oriented and alert    IV Access:  - None    Nursing Mobility/ADLs:  Walking   Assisted  Transfer  Assisted  Bathing  Assisted  Dressing  Assisted  Toileting  Assisted  Feeding  Independent  Med Admin  Independent  Med Delivery   whole    Wound Care Documentation and Therapy:  Incision 07/20/22 Ankle Anterior; Left (Active)   Dressing Status Clean;Dry; Intact 07/26/22 0800   Drainage Amount None 07/26/22 0800   Number of days: 6       Incision 07/25/22 Ankle Left (Active)   Dressing Status Clean;Dry; Intact 07/26/22 0917   Dressing/Treatment Ace wrap 07/26/22 0917   Drainage Amount None 07/26/22 0800   Number of days: 0        Elimination:  Continence: Bowel: Yes  Bladder: Yes  Urinary Catheter: None   Colostomy/Ileostomy/Ileal Conduit: No       Date of Last BM: ***    Intake/Output Summary (Last 24 hours) at 7/26/2022 1011  Last data filed at 7/25/2022 2100  Gross per 24 hour   Intake 1202 ml   Output 10 ml   Net 1192 ml     I/O last 3 completed shifts: In: 4797 [I.V.:1202]  Out: 10 [Blood:10]    Safety Concerns: At Risk for Falls    Impairments/Disabilities:      None    Nutrition Therapy:  Current Nutrition Therapy:   - Oral Diet:  General    Routes of Feeding: Oral  Liquids: Thin Liquids  Daily Fluid Restriction: no  Last Modified Barium Swallow with Video (Video Swallowing Test): not done    Treatments at the Time of Hospital Discharge:   Respiratory Treatments: ***  Oxygen Therapy:  is not on home oxygen therapy.   Ventilator:    - No ventilator support    Rehab Therapies: Physical Therapy and Occupational Therapy  Weight Bearing Status/Restrictions: Non-weight bearing on left leg  Other Medical Equipment (for information only, NOT a DME order):  walker and bedside commode  Other Treatments: ***    Patient's personal belongings (please select all that are sent with patient):  None    RN SIGNATURE:  Electronically signed by Noah Combs RN on 7/26/22 at 10:18 AM EDT    CASE MANAGEMENT/SOCIAL WORK SECTION    Inpatient Status Date: Observation:    Readmission Risk Assessment Score:  Readmission Risk              Risk of Unplanned Readmission:  [id number]           Discharging to Facility/ Agency   Name: Terry Aly  Address:  Phone: [phone]  Fax: [phone]    Dialysis Facility (if applicable)   Name:  Address:  Dialysis Schedule:  Phone:  Fax:    / signature: Electronically signed by MINAL Walker on 7/26/22 at 10:11 AM EDT    PHYSICIAN SECTION    Prognosis: Good    Condition at Discharge: Stable    Rehab Potential (if transferring to Rehab): Good    Recommended Labs or Other Treatments After Discharge: ***    Physician Certification: I certify the above information and transfer of Fabio Acosta  is necessary for the continuing treatment of the diagnosis listed and that she requires [facility] for less 30 days.      Update Admission H&P: {CHP DME Changes in EBOQJ:[id number]}    PHYSICIAN SIGNATURE:  Electronically signed by Natan Aguilera on 7/26/22 at 8:18 AM EDT

## 2022-07-26 NOTE — CARE COORDINATION
7/26/2022: SS Note/Discharge plan: Observation:  SS Consult for \"discharge planning\" for d/c today, sw contacted Lionel Starks admissions liaison for [facility] confirming skilled return for today and that their w/c [facility] will provide transport, pt has her w/c with her, facility request pt be brought down to the main lobby at 2:00pm, pt and nursing informed, LISA initiated, pt notified her son of her transfer arrangements.  Electronically signed by MINAL Can on 7/26/2022 at 10:05 AM

## 2022-07-26 NOTE — PROGRESS NOTES
[facility]  [facility] [place]        Date:2022                                                  Patient Name: Marcellus Aquino    MRN: [id number]    : 1950    Room: [address] [place] [address]-      Evaluating OT: Sascha Rodríguez OTR/L; [id number]     Referring Provider and Specific Provider Orders/Date:      22  OT eval and treat  Start:  22,   End:  22,   ONE TIME,   Standing Count:  1 Occurrences,   [address],       Placement Recommendation: Subacute       Diagnosis:   No diagnosis found.        Surgery: Status post removal of spanning left ankle external fixator, ORIF of left trimalleolar ankle fracture      Pertinent Medical History:       Past Medical History:   Diagnosis Date    Hypertension     PONV (postoperative nausea and vomiting)          Past Surgical History:   Procedure Laterality Date    ANKLE FRACTURE SURGERY Left 2022    LEFT ANKLE EXTERNAL FIXATOR performed by Rozina Villela DO at [address] [place] [address] Left 2022    LEFT ANKLE OPEN REDUCTION INTERNAL FIXATION **DO NOT CHANGE TIME** performed by Rozina Villela DO at [facility] (CERVIX STATUS UNKNOWN)        Precautions:  Fall Risk, NWB: L LE, s/p: removal of spanning left ankle external fixator, ORIF of left trimalleolar ankle fracture     Assessment of current deficits:    [x] Functional mobility  [x]ADLs  [x] Strength               []Cognition    [x] Functional transfers   [x] IADLs         [] Safety Awareness   [x]Endurance    [] Fine Coordination              [x] Balance      [] Vision/perception   []Sensation     []Gross Motor Coordination  [x] ROM  [] Delirium                   [] Motor Control     OT PLAN OF CARE   OT POC based on physician orders, patient diagnosis and results of clinical assessment    Frequency/Duration 1-3 days/wk for 2 weeks PRN     Specific OT Treatment Interventions to include:   * Instruction/training on adapted ADL techniques and AE recommendations to increase functional independence within precautions       * Training on energy conservation strategies, correct breathing pattern and techniques to improve independence/tolerance for self-care routine  * Functional transfer/mobility training/DME recommendations for increased independence, safety, and fall prevention  * Patient/Family education to increase follow through with safety techniques and functional independence  * Recommendation of environmental modifications for increased safety with functional transfers/mobility and ADLs  * Therapeutic exercise to improve motor endurance, ROM, and functional strength for ADLs/functional transfers  * Therapeutic activities to facilitate/challenge dynamic balance, stand tolerance for increased safety and independence with ADLs  * Positioning to improve skin integrity, interaction with environment and functional independence    Recommended Adaptive Equipment: TBD at rehab      Home Living: pt came to us from rehab ([facility]) ; prior to ankle fracture she resided alone in a single family home, 5 steps to enter with rail, tub shower, and laundry in the basement     Equipment owned: none     Prior Level of Function: has needed assistance with ADLs and IADLs since fracturing her L ankle a week ago, prior to fracture the patient was independent; at rehab the patient was a 1-2 person assist for transfers to and from the wheelchair. Driving: yes but not since fx     Pain Level: 4/10 pain in L ankle; L LE elevated. Nursing notified.       Cognition: A&O: 4/4; Follows 3 step directions   Memory: good    Sequencing: good    Problem solving: good    Judgement/safety: good     [facility]   AM-PAC Daily Activity Inpatient   How much help for putting on and taking off regular lower body clothing?: A Lot  How much help for Bathing?: A Lot  How much help for Toileting?: Total  How much help for putting on and taking off regular upper body clothing?: A Little  How much help for taking care of personal grooming?: A Little  How much help for eating meals?: None  AM-PAC Inpatient Daily Activity Raw Score: 15  AM-PAC Inpatient ADL T-Scale Score : 34.69  ADL Inpatient CMS 0-100% Score: 56.46  ADL Inpatient CMS G-Code Modifier : CK     Functional Assessment:    Initial Eval Status  Date: 7/26/22 Treatment Status  Date: STGs = LTGs  Time frame: 10-14 days   Feeding Independent   Independent    Grooming Supervision   Independent    UB Dressing Minimal assist to adjust and tie gown and outer gown  Independent    LB Dressing Moderate Assist   Supervision    Bathing Moderate Assist  Supervision    Toileting Dependent for hygiene after using bedside commode  Supervision    Bed Mobility  Supine to sit: Supervision   Minimal assist to scoot to edge of bed. Sit to supine: N/T as pt was seated in bedside chair with L LE elevated on a stool and 2 pillows. Supine to sit: Independent   Sit to supine: Independent    Functional Transfers Moderate Assist from EOB to wheeled walker with bed height elevated. Moderate Assist for transfer to and from bedside commode. Minimal assist for transfer from standing with wheeled walker to bedside chair. Transfer training with verbal cues for hand placement throughout session to improve safety. Supervision    Functional Mobility Moderate Assist with wheeled walker to improve balance from EOB to [facility] and BS to bedside chair, verbal cues for walker sequence and safety. Pt used a combination of toe heel shuffle of the R LE and hopping. Good adherence to NWB: L LE.    Modified [place]    Balance Sitting:     Static: good     Dynamic: fair plus  Standing: fair with wheeled walker     Activity Tolerance fair   good    Visual/  Perceptual Glasses: yes, readers                 Hand Dominance: right      AROM (PROM) Strength Additional Info:    SHIVAM WFL 5/5 good  and wfl FMC/dexterity noted during ADL tasks     LUE WFL 5/5 good  and wfl FMC/dexterity noted during ADL tasks       Hearing: WFL   Sensation:  No c/o numbness or tingling  Tone: WFL   Edema: no    Comments: Upon arrival the patient was supine  At end of session, patient was up in bedside chair with L LE elevated on a stool and two pillows. Call light and phone within reach, all lines and tubes intact. Overall patient demonstrated decreased independence and safety during completion of ADL/functional transfer/mobility tasks. Pt would benefit from continued skilled OT to increase safety and independence with completion of ADL/IADL tasks for functional independence and quality of life. Treatment: OT treatment provided this date includes:   Instruction/training on safety and adapted techniques for completion of ADLs   Instruction/training on safe functional mobility/transfer techniques   Instruction/training on energy conservation/work simplification for completion of ADLs   Proper Positioning/Alignment    Rehab Potential: Good for established goals. Patient / Family Goal: return home       Patient and/or family were instructed on functional diagnosis, prognosis/goals and OT plan of care. Demonstrated good understanding.      Eval Complexity: Low    Time In: 8:10 am   Time Out: 8:55 am    Total Treatment Time: 25      Min Units   OT Eval Low 97165  X  1    OT Eval Medium 61990      OT Eval High 29439      OT Re-Eval 43823            ADL/Self Care 33834 15 1    Therapeutic Activities 14251  10  1    Therapeutic Ex 64984       Orthotic Management 04959       Manual 06530     Neuro Re-Ed 58005       Non-Billable Time        Evaluation Time additionally includes thorough review of current medical information, gathering information on past medical history/social history and prior level of function, interpretation of standardized testing/informal observation of tasks, assessment of data and development of plan of care and goals.         Evaluating OT: Sascha Rodríguez OTR/L; [id number]

## 2022-07-26 NOTE — PROGRESS NOTES
Physical Therapy  Physical Therapy Initial Evaluation/Plan of Care    Room #:  1779/0171-65  Patient Name: Amara Moreau  YOB: 1950  MRN: [id number]    Date of Service: 7/26/2022     Tentative placement recommendation: Subacute rehab  Equipment recommendation: To be determined      Evaluating Physical Therapist: Virginia Medina PT, DPT #[id number]      Specific Provider Orders/Date/Referring Provider :     07/25/22 2115    PT eval and treat  Start:  07/25/22 2115,   End:  07/25/22 2115,   ONE TIME,   Standing Count:  1 Occurrences,   R         Porfirio Cali, DO Acknowledge New     Admitting Diagnosis:   Trimalleolar fracture, left, closed, initial encounter [S82.852A]  Left trimalleolar fracture, closed, initial encounter [K06.659X]      Surgery: L ankle ORIF on 7/26      Patient Active Problem List   Diagnosis    Closed bimalleolar fracture of left ankle    Tibia/fibula fracture    Left trimalleolar fracture, closed, initial encounter        ASSESSMENT of Current Deficits Patient exhibits decreased strength, balance, and endurance impairing functional mobility, transfers, gait , gait distance, and tolerance to activity. Pt required VC for sequencing, hand placement, weight shifting during transfers, and general education of biomechanics in order to maximize function while remaining NWB on 1 leg. Pt was mild-moderately unsteady during session with VC for safety. Pt able to perform seated exercises following function with VC for technique PT returned for 2nd session later in the day to assist pt to get to the bedside commode and to help pt transfers to [place] [address] in preparation for d/c.         PHYSICAL THERAPY  PLAN OF CARE       Physical therapy plan of care is established based on physician order,  patient diagnosis and clinical assessment    Current Treatment Recommendations:    -Bed Mobility: Lower extremity exercises  and Trunk control activities   -Sitting Balance: Incorporate reaching activities to activate trunk muscles , Hands on support to maintain midline , Facilitate active trunk muscle engagement , Facilitate postural control in all planes , and Engage in core activities to allow for movement within base of support   -Standing Balance: Perform strengthening exercises in standing to promote motor control with or without upper extremity support , Instruct patient on adequate base of support to maintain balance, and Challenge balance utilizing reaching  activities beyond center of gravity    -Transfers: Provide instruction on proper hand and foot position for adequate transfer of weight onto lower extremities and use of gait device if needed, Cues for hand placement, technique and safety. Provide stabilization to prevent fall , Facilitate weight shift forward on to lower extremities and provide necessary stabilization of bilateral lower extremities , Support transfer of weight on to lower extremities, and Assist with extension of knees trunk and hip to accept weight transfer   -Gait: Gait training, Standing activities to improve: base of support, weight shift, weight bearing , Exercises to improve trunk control, Exercises to improve hip and knee control, Performance of protected weight bearing activities, and Activities to increase weight bearing   -Endurance: Utilize Supervised activities to increase level of endurance to allow for safe functional mobility including transfers and gait  and Use graduated activities to promote good breathing techniques and provide support and education to maximize respiratory function    PT long term treatment goals are located in below grid    Patient and or family understand(s) diagnosis, prognosis, and plan of care. Frequency of treatments: Patient will be seen  twice daily.          Prior Level of Function: Patient ambulated independently   Rehab Potential: good - for baseline    Past medical history:   Past Medical History:   Diagnosis Date    Hypertension     PONV (postoperative nausea and vomiting)      Past Surgical History:   Procedure Laterality Date    ANKLE FRACTURE SURGERY Left 7/20/2022    LEFT ANKLE EXTERNAL FIXATOR performed by Rhoda Flores DO at [facility] Left 7/25/2022    LEFT ANKLE OPEN REDUCTION INTERNAL FIXATION **DO NOT CHANGE TIME** performed by Rhoda Flores DO at [place] [address] (CERVIX STATUS UNKNOWN)         SUBJECTIVE:    Precautions: Up with assistance, falls and non weight bearing LLE      Social history: Patient to be d/mackenzie back to [facility] for rehab    Equipment owned: None    AM-PAC Basic Mobility       -[facility] Mobility Inpatient   How much difficulty turning over in bed?: A Little  How much difficulty sitting down on / standing up from a chair with arms?: A Lot  How much difficulty moving from lying on back to sitting on side of bed?: A Little  How much help from another person moving to and from a bed to a chair?: A Lot  How much help from another person needed to walk in hospital room?: A Lot  How much help from another person for climbing 3-5 steps with a railing?: Total  AM-PAC Inpatient Mobility Raw Score : 13  AM-PAC Inpatient T-Scale Score : 36.74  Mobility Inpatient CMS 0-100% Score: 64.91  Mobility Inpatient CMS G-Code Modifier : CL    Nursing cleared patient for PT evaluation. The admitting diagnosis and active problem list as listed above have been reviewed prior to the initiation of this evaluation. OBJECTIVE;   Initial Evaluation  Date: 7/26/2022 Treatment Date:     Short Term/ Long Term   Goals   Was pt agreeable to Eval/treatment? Yes  To be met in 3 days   Pain level   0/10       Bed Mobility    Rolling: Not assessed patient in chair    Supine to sit: Not assessed patient in chair    Sit to supine: Not assessed patient in chair    Scooting: Not assessed patient in chair    Rolling: Independent    Supine to sit:  Independent    Sit to supine: Independent    Scooting: Independent     Transfers Sit to stand: Moderate assist of 1   Sit to stand: Supervision     Ambulation     2 x 10' and 15 side steps using  wheeled walker with Moderate assist of 1   for walker control, walker approximation, balance, upright, weight shift, and safety while remaining NWB through LLE    > 50 feet using  wheeled walker with Supervision while remaining NWB through LLE   Stair negotiation: ascended and descended   Not assessed      ROM Within functional limits    Increase range of motion 10% of affected joints    Strength BUE:  refer to OT eval  RLE:  4/5  LLE:  3+/5  Increase strength in affected mm groups by 1/3 grade   Balance Sitting EOB:  fair +  Dynamic Standing:  fair -  Sitting EOB:  good   Dynamic Standing: fair      Patient is Alert & Oriented x person, place, time, and situation and follows directions    Sensation:  Patient  denies numbness/tingling   Edema:  no   Endurance: fair  -    Vitals: room air   Blood Pressure at rest  Blood Pressure during session    Heart Rate at rest  Heart Rate during session    SPO2 at rest %  SPO2 during session %     Patient education  Patient educated on role of Physical Therapy, risks of immobility, safety and plan of care, energy conservation,  importance of mobility while in hospital , ankle pumps, quad set and glut set for edema control, blood clot prevention, importance and purpose of adaptive device and adjusted to proper height for the patient. , and safety      Patient response to education:   Pt verbalized understanding Pt demonstrated skill Pt requires further education in this area   Yes Partial Yes      Treatment:  Patient practiced and was instructed/facilitated in the following treatment: Patient Sat edge of bed 10 minutes with Supervision  to increase dynamic sitting balance and activity tolerance. Pt performed bed mobility, transfers, ambulation, seated exercises. Therapeutic Exercises:  ankle pumps, heel raises, long arc quad, and seated marching  x 10 reps x 2 sets. At end of session, patient in chair with     call light and phone within reach,  all lines and tubes intact, nursing notified. Patient would benefit from continued skilled Physical Therapy to improve functional independence and quality of life. Patient's/ family goals   rehab    Time in  80  Time out  1114  And   Time in  1226  Time out  1242    Total Treatment Time  39 minutes    Evaluation time includes thorough review of current medical information, gathering information on past medical history/social history and prior level of function, completion of standardized testing/informal observation of tasks, assessment of data, and development of Plan of care and goals.      CPT codes:  Low Complexity PT evaluation (25288)  Therapeutic activities (53974)   26 minutes  2 unit(s)  Therapeutic exercises (95077)   13 minutes  1 unit(s)    Marilin Mendiola, PT

## 2022-07-26 NOTE — PLAN OF CARE
Problem: Discharge Planning  Goal: Discharge to home or other facility with appropriate resources  7/26/2022 0942 by Rosalva Erazo RN  Outcome: Resolved/Met     Problem: Pain  Goal: Verbalizes/displays adequate comfort level or baseline comfort level  7/26/2022 0942 by Rosalva Erazo RN  Outcome: Resolved/Met     Problem: Skin/Tissue Integrity  Goal: Absence of new skin breakdown  7/26/2022 0942 by Rosalva Erazo RN  Outcome: Resolved/Met     Problem: Safety - Adult  Goal: Free from fall injury  Outcome: Resolved/Met  Flowsheets (Taken 7/26/2022 0916)  Free From Fall Injury: Instruct family/caregiver on patient safety     Problem: Safety - Adult  Goal: Free from fall injury  Outcome: Resolved/Met  Flowsheets (Taken 7/26/2022 0916)  Free From Fall Injury: Instruct family/caregiver on patient safety     Problem: ABCDS Injury Assessment  Goal: Absence of physical injury  7/26/2022 0942 by Rosalva Erazo RN  Outcome: Resolved/Met  Flowsheets (Taken 7/26/2022 0916)  Absence of Physical Injury: Implement safety measures based on patient assessment

## 2022-07-26 NOTE — PROGRESS NOTES
Department of Orthopedic Surgery  Resident Progress Note    Patient seen and examined. Pain is well controlled today, denies any nausea or vomiting. Denies any numbness or tingling on the operative leg. VITALS:  BP (!) 141/66   Pulse 73   Temp 98.1 °F (36.7 °C) (Oral)   Resp 20   Ht 5' 3\" (1.6 m)   Wt 234 lb (106.1 kg)   SpO2 95%   BMI 41.45 kg/m²     General: alert and oriented to person, place and time, well-developed and well-nourished, in no acute distress    MUSCULOSKELETAL:   left lower extremity:  Splint maintained, C/D/I  Compartments soft and compressible  +5/5 dorsiflexion and plantarflexion of the toes  Toes warm and perfused, unable to assess distal pulses due to splint  Distal sensation grossly intact to Peroneals, Sural, Saphenous, and tibial nrs where able to assess around splint    CBC:   Lab Results   Component Value Date/Time    WBC 13.3 07/26/2022 04:48 AM    HGB 11.9 07/26/2022 04:48 AM    HCT 37.8 07/26/2022 04:48 AM     07/26/2022 04:48 AM     PT/INR:    Lab Results   Component Value Date/Time    PROTIME 12.0 07/20/2022 04:58 AM    INR 1.0 07/20/2022 04:58 AM         ASSESSMENT  Status post removal of spanning left ankle external fixator, ORIF of left trimalleolar ankle fracture    PLAN      Continue physical therapy and protocol: NWB -left LE  24 hour abx coverage  Deep venous thrombosis prophylaxis -okay to resume today, early mobilization  PT/OT  Pain Control: IV and PO  Monitor H&H  D/C Plan: SNF [place], from orthopedic standpoint, patient can be discharged when medically stable with appropriate plan in place.   PT/OT/SW recommendations appreciated

## 2022-07-26 NOTE — ANESTHESIA POSTPROCEDURE EVALUATION
Department of Anesthesiology  Postprocedure Note    Patient: Mary Shultz  MRN: [id number]  YOB: 1950  Date of evaluation: 7/25/2022      Procedure Summary     Date: 07/25/22 Room / Location: [address] [place] [address] / [address] [place] [address]    Anesthesia Start: 1646 Anesthesia Stop: 1934    Procedure: LEFT ANKLE OPEN REDUCTION INTERNAL FIXATION **DO NOT CHANGE TIME** (Left: Ankle) Diagnosis:       Trimalleolar fracture, left, closed, initial encounter      (Trimalleolar fracture, left, closed, initial encounter [L32.613B])    Surgeons: Willy Cesar DO Responsible Provider: Marissa Hunter MD    Anesthesia Type: general ASA Status: 3          Anesthesia Type: No value filed.     Marco Phase I: Marco Score: 9    Marco Phase II:        Anesthesia Post Evaluation    Patient location during evaluation: PACU  Patient participation: complete - patient participated  Level of consciousness: awake  Airway patency: patent  Nausea & Vomiting: no nausea and no vomiting  Complications: no  Cardiovascular status: hemodynamically stable  Respiratory status: acceptable  Hydration status: euvolemic

## 2022-07-29 DIAGNOSIS — S82.842D CLOSED BIMALLEOLAR FRACTURE OF LEFT ANKLE WITH ROUTINE HEALING, SUBSEQUENT ENCOUNTER: Primary | ICD-10-CM

## 2022-07-29 PROCEDURE — 29425 APPL SHORT LEG CAST WALKING: CPT | Performed by: ORTHOPAEDIC SURGERY

## 2022-07-31 NOTE — OP NOTE
Operative Note      Patient: Lauren Macias  YOB: 1950  MRN: [id number]    Date of Procedure: 7/25/2022    Pre-Op Diagnosis:   Trimalleolar fracture, left, closed  Syndesmotic injury, left ankle  Presence of external fixator, left ankle    Post-Op Diagnosis: Same       Procedure:  1. Open treatment of trimalleolar left ankle fracture without fixation of posterior lip  2. Open treatment of distal tibiofibular joint (syndesmosis) disruption includes internal fixation  3. Removal, under anesthesia, of external fixator device, left ankle    Surgeon(s): Tomás Perez DO    Assistant:   First Assistant: Cristopher Fuentes RN  Resident: Amanda Lopez DO; Mary Paige DO    Anesthesia: General    Estimated Blood Loss (mL): 20    Complications: None    Specimens:   * No specimens in log *    Implants:  Implant Name Type Inv.  Item Serial No.  Lot No. LRB No. Used Action   PLATE BNE [id number] 6 H L DST LAT FIBULAR S STL JOSIE COMPR FOR - [id number]  PLATE BNE [id number] 6 H L DST LAT FIBULAR S STL JOSIE COMPR FOR  DEP[employer]elly USA-  Left 1 Implanted   SCREW BNE L14MM DIA2.7MM STAN S STL ST JOSIE FULL THRD T8 - [id number]  SCREW BNE L14MM DIA2.7MM STAN S STL ST JOSIE FULL THRD T8  DEPUY MovieSet USA-  Left 4 Implanted   SCREW BNE L16MM DIA2.7MM STAN S STL ST JOSIE FULL THRD T8 - [id number]  SCREW BNE L16MM DIA2.7MM STAN S STL ST JOSIE FULL THRD T8  DEPUY [employer]Set USA-  Left 1 Implanted   SCREW BNE L12MM DIA3.5MM STAN S STL ST NONCANNULATED JOSIE - [id number]  SCREW BNE L12MM DIA3.5MM STAN S STL ST NONCANNULATED JOSIE  DEPUY SYNTHES USA-WD  Left 1 Explanted   SCREW BNE L14MM DIA3.5MM STAN S STL ST NONCANNULATED JOSIE - [id number]  SCREW BNE L14MM DIA3.5MM STAN S STL ST NONCANNULATED JOSIE  DEPUY SYNTHES USA-  Left 1 Implanted   SCREW BNE L14MM DIA3.5MM STAN S STL ST JOSIE FULL THRD - [id number]  SCREW BNE L14MM DIA3.5MM STAN S STL ST JOSIE FULL THRD  DEPFut[employer] Management SYNTHES USA-WD  Left 1 Implanted   SCREW BNE L12MM DIA3.5MM STAN S STL ST NONCANNULATED JOSIE - [id number]  SCREW BNE L12MM DIA3.5MM STAN S STL ST NONCANNULATED JOSIE  DEPUY SYNTHES USA-WD  Left 3 Implanted   SCREW BNE L46MM DIA4MM S STL KEYSHA LNG HALF THRD SM HEX SOCK - [id number]  SCREW BNE L46MM DIA4MM S STL KEYSHA LNG HALF THRD SM HEX SOCK  DEPUY SYNTHES USA-WD  Left 1 Implanted   SCREW BNE L45MM DIA3.5MM STAN S STL ST NONCANNULATED JOSIE - [id number]  SCREW BNE L45MM DIA3.5MM STAN S STL ST NONCANNULATED JOSIE  DEPUY SYNTHES USA-WD  Left 1 Implanted         Drains:   [REMOVED] Urinary Catheter [place] (Removed)   $ Urethral catheter insertion $ Not inserted for procedure 07/20/22 1513   Catheter Indications Perioperative use for selected surgical procedures 07/21/22 2000   Site Assessment Pink 07/20/22 1513   Urine Color Yellow 07/20/22 2000   Urine Appearance Clear 07/21/22 2000   Collection Container Standard 07/20/22 2000   Status Draining;Patent 07/21/22 2000   Output (mL) 650 mL 07/21/22 5243         Detailed Description of Procedure:       INDICATIONS FOR OPERATION:   The patient sustained the aforementioned injury. Radiographs confirmed displacement of the ankle mortise requiring surgical stabilization. Risks and benefits were explained and discussed with patient and or consenting parties including but not limited to infection, refracture, delayed union, hardware failure, malunion, cosmesis, painful hardware that may require possible removal later, and neurovascular injury. Patient and consenting parties understand, agree with above, and wished to proceed. She chose to proceed with operative intervention. DESCRIPTION OF OPERATION:   The patient was seen in the holding area, the appropriate extremity marked with an indelible pen . Consent signed. General anesthesia was administered by anesthesia. He was taken to the operative suite, identified, and placed on the OR table. General anesthesia was administered. A blanket roll was placed under the operative hip.  A well-padded tourniquet was placed on the thigh of the operative leg. The lower extremity was elevated, prepped with alcohol, Betadine, and ChloraPrep from the toes to knee, draped in normal sterile fashion. The patient received antibiotics prior to incision. The procedure began after official timeout with removal of external fixator device under sterile conditions and complete removal confirmed under fluoroscopic guidance. The leg was elevated, exsanguinated, the tourniquet inflated to 300 mmHg. Incision was made laterally, centered over the lateral malleolus fracture. The incision was carried through subcutaneous tissue and the fracture was identified. Soft tissue debris was removed. The fracture was reduced preliminary through gentle manipulation of proximal and distal bone fragments. The lateral malleolus fracture was now reduced preliminary. A 6 hole, Synthes 1/3 distal locking plate plate was then placed and secured proximally and distally. Intraoperative fluoroscopic images were obtained. At this point attention was turned toward the medial side with a curvilinear incision centered over the medial malleolus. This was taken down through soft tissue with neurovascular structures protected. The medial malleolus fracture was comminuted but was able to be reduced with use of bone clamp and secured. A single cannulated screw was placed under fluoroscopic guidance using K wire placement. However there was still instability noted on the syndesmosis after stressing. Therefore a syndesmotic screw was placed across the fibula to the tibia and parallel to the ankle joint with fluoroscopic guidance. This confirmed placement of hardware and a reduced mortise. The ankle was examined under fluoroscopy. Final fluoroscopic images confirmed that the mortise was reduced and stable to lateral stress and external rotation. The wound was then copiously irrigated with normal saline on gravity flow.  Hemostasis was

## 2022-08-15 DIAGNOSIS — S82.842D CLOSED BIMALLEOLAR FRACTURE OF LEFT ANKLE WITH ROUTINE HEALING, SUBSEQUENT ENCOUNTER: Primary | ICD-10-CM

## 2022-08-16 ENCOUNTER — OFFICE VISIT (OUTPATIENT)
Dept: ORTHOPEDIC SURGERY | Age: 72
End: 2022-08-16

## 2022-08-16 VITALS — BODY MASS INDEX: 41.46 KG/M2 | WEIGHT: 234 LBS | HEIGHT: 63 IN

## 2022-08-16 DIAGNOSIS — S82.842D CLOSED BIMALLEOLAR FRACTURE OF LEFT ANKLE WITH ROUTINE HEALING, SUBSEQUENT ENCOUNTER: Primary | ICD-10-CM

## 2022-08-16 PROCEDURE — 99024 POSTOP FOLLOW-UP VISIT: CPT | Performed by: ORTHOPAEDIC SURGERY

## 2022-08-16 NOTE — PROGRESS NOTES
Chief Complaint   Patient presents with    Ankle Pain     Left ankle ORIF post op follow up. DOS 7/25/2022           Ms. [facility] [place] returns today for follow-up of a left ankle fracture which was treated with ORIF. Date of surgery was 7/25/2022. she reports doing well with pain and restrictions. She is currently still in rehab center. Physical Exam:  Left ankle- nontender to palpation at the area of the fracture site. ROM   stiff but intact          The incision is healing well without evidence of infection. Pulses are intact and symmetric bilaterally         Strength 4/5         Sensation SI LT    Xrays:    XR ANKLE LEFT (MIN 3 VIEWS)    Result Date: 8/16/2022  EXAMINATION: THREE XRAY VIEWS OF THE LEFT ANKLE 8/16/2022 11:22 am COMPARISON: Left ankle series from July 25, 2022 HISTORY: ORDERING SYSTEM PROVIDED HISTORY: Closed bimalleolar fracture of left ankle with routine healing, subsequent encounter FINDINGS: Overlying cast material present. There are postsurgical changes about the left ankle. No obvious hardware complications. No change in appearance from previous exam.  Previously described fractures show evidence of interval healing. Ankle mortise appears maintained. There are no complicating features. Postsurgical and posttraumatic changes about the left ankle. No obvious complications. Radiographic findings reviewed with patient    Impression:   Ashley Tejada was seen today for ankle pain. Diagnoses and all orders for this visit:    Closed bimalleolar fracture of left ankle with routine healing, subsequent encounter          Plan:   Patient seen and examined. Imaging  reviewed with patient in detail. Natural history and course discussed with patient in long discussion  Treatment options discussed with patient in detail including risks and benefits. Sutures/staples removed under sterile conditions Steri-Strips applied. Local wound care education provided.    Patient should do well with continued conservative management. Physical therapy/Occupational Therapy  Nonweightbearing in cast boot  In a 15 minute assessment and discussion, patient was provided and fitted for a removable cast boot distributed and applied. She was educated in detail how to use cast boot and the importance of use as well as range of motion and HEP exercises.

## 2022-09-02 DIAGNOSIS — S82.842D CLOSED BIMALLEOLAR FRACTURE OF LEFT ANKLE WITH ROUTINE HEALING, SUBSEQUENT ENCOUNTER: Primary | ICD-10-CM

## 2022-09-06 ENCOUNTER — OFFICE VISIT (OUTPATIENT)
Dept: ORTHOPEDIC SURGERY | Age: 72
End: 2022-09-06
Payer: MEDICARE

## 2022-09-06 VITALS — WEIGHT: 235 LBS | HEIGHT: 63 IN | BODY MASS INDEX: 41.64 KG/M2

## 2022-09-06 DIAGNOSIS — S82.842D CLOSED BIMALLEOLAR FRACTURE OF LEFT ANKLE WITH ROUTINE HEALING, SUBSEQUENT ENCOUNTER: Primary | ICD-10-CM

## 2022-09-06 PROCEDURE — 97763 ORTHC/PROSTC MGMT SBSQ ENC: CPT | Performed by: ORTHOPAEDIC SURGERY

## 2022-09-06 PROCEDURE — 99024 POSTOP FOLLOW-UP VISIT: CPT | Performed by: ORTHOPAEDIC SURGERY

## 2022-09-06 RX ORDER — ASPIRIN 325 MG
325 TABLET ORAL DAILY
COMMUNITY

## 2022-09-06 RX ORDER — SENNA PLUS 8.6 MG/1
1 TABLET ORAL DAILY
COMMUNITY

## 2022-09-06 NOTE — PROGRESS NOTES
Chief Complaint   Patient presents with    Post-Op Check     Patient here for postop check left ankle ORIF. Patient denies any tingling and numbness in the left foot. Patient does have some swelling, and does apply ice when needed. Patient resides at [facility] [address] [facility] [facility]. Ms. Deja Mccormick returns today for follow-up of a left ankle fracture which was treated with ORIF. Date of surgery was 7/25/2022. she reports doing well with pain and restrictions. She is currently still in rehab center. Physical Exam:  Left ankle- nontender to palpation at the area of the fracture site. ROM   stiff but intact          The incision is healing well without evidence of infection. Pulses are intact and symmetric bilaterally         Strength 4/5         Sensation SI LT    Xrays:    XR ANKLE LEFT (MIN 3 VIEWS)    Result Date: 8/16/2022  EXAMINATION: THREE XRAY VIEWS OF THE LEFT ANKLE 8/16/2022 11:22 am COMPARISON: Left ankle series from July 25, 2022 HISTORY: ORDERING SYSTEM PROVIDED HISTORY: Closed bimalleolar fracture of left ankle with routine healing, subsequent encounter FINDINGS: Overlying cast material present. There are postsurgical changes about the left ankle. No obvious hardware complications. No change in appearance from previous exam.  Previously described fractures show evidence of interval healing. Ankle mortise appears maintained. There are no complicating features. Postsurgical and posttraumatic changes about the left ankle. No obvious complications. XR ANKLE LEFT (MIN 3 VIEWS)    Result Date: 9/6/2022  EXAMINATION: THREE XRAY VIEWS OF THE LEFT ANKLE 9/6/2022 10:39 am COMPARISON: X-ray dated 08/16/2022 HISTORY: ORDERING SYSTEM PROVIDED HISTORY: Closed bimalleolar fracture of left ankle with routine healing, subsequent encounter FINDINGS: Interval removal of splint cast from prior comparison with hardware stable in appearance and alignment of the distal tibia and fibula. Ankle mortise symmetric. No progressive malalignment or irregularity     Stable alignment of hardware and overall configuration of the left ankle from prior comparison without evidence for loosening failure or acute findings otherwise               Radiographic findings reviewed with patient    Impression:   Lesly Kim was seen today for post-op check. Diagnoses and all orders for this visit:    Closed bimalleolar fracture of left ankle with routine healing, subsequent encounter  -     Brace general            Plan:   Patient seen and examined. Imaging  reviewed with patient in detail. Natural history and course discussed with patient in long discussion  Treatment options discussed with patient in detail including risks and benefits. Steri-Strips were not removed by patient family or rehab place as instructed. Patient was questioned about this and they state that the rehab staff did not even remove her cast boot the entire time for range of motion exercises. At this point patient may remove cast boot for range of motion and progress his weightbearing as tolerated with cast boot and ankle shoe orthotic. Steri-Strips were removed today showing mild skin irritation but no ksenia skin breakdown and patient and family were educated about these findings local wound care education provided. Patient should do well with continued conservative management. Physical therapy/Occupational Therapy  weightbearing in cast boot as tolerated. Remove otherwise and transition over the next 2 to 4 weeks with ASO  In a 15 minute assessment and discussion, patient was reassessed and refitted for a removable cast boot readjusted and reapplied. She was educated in detail how to use cast boot and the importance of use as well as range of motion and HEP exercises.

## 2022-10-24 DIAGNOSIS — S82.842D CLOSED BIMALLEOLAR FRACTURE OF LEFT ANKLE WITH ROUTINE HEALING, SUBSEQUENT ENCOUNTER: Primary | ICD-10-CM

## 2022-10-25 ENCOUNTER — OFFICE VISIT (OUTPATIENT)
Dept: ORTHOPEDIC SURGERY | Age: 72
End: 2022-10-25
Payer: MEDICARE

## 2022-10-25 VITALS — TEMPERATURE: 98 F | BODY MASS INDEX: 41.64 KG/M2 | WEIGHT: 235 LBS | HEIGHT: 63 IN

## 2022-10-25 DIAGNOSIS — S82.842D CLOSED BIMALLEOLAR FRACTURE OF LEFT ANKLE WITH ROUTINE HEALING, SUBSEQUENT ENCOUNTER: Primary | ICD-10-CM

## 2022-10-25 PROCEDURE — 1123F ACP DISCUSS/DSCN MKR DOCD: CPT | Performed by: ORTHOPAEDIC SURGERY

## 2022-10-25 PROCEDURE — 1036F TOBACCO NON-USER: CPT | Performed by: ORTHOPAEDIC SURGERY

## 2022-10-25 PROCEDURE — G8484 FLU IMMUNIZE NO ADMIN: HCPCS | Performed by: ORTHOPAEDIC SURGERY

## 2022-10-25 PROCEDURE — 99213 OFFICE O/P EST LOW 20 MIN: CPT | Performed by: ORTHOPAEDIC SURGERY

## 2022-10-25 PROCEDURE — G8427 DOCREV CUR MEDS BY ELIG CLIN: HCPCS | Performed by: ORTHOPAEDIC SURGERY

## 2022-10-25 PROCEDURE — 1090F PRES/ABSN URINE INCON ASSESS: CPT | Performed by: ORTHOPAEDIC SURGERY

## 2022-10-25 PROCEDURE — G8399 PT W/DXA RESULTS DOCUMENT: HCPCS | Performed by: ORTHOPAEDIC SURGERY

## 2022-10-25 PROCEDURE — 3017F COLORECTAL CA SCREEN DOC REV: CPT | Performed by: ORTHOPAEDIC SURGERY

## 2022-10-25 PROCEDURE — G8417 CALC BMI ABV UP PARAM F/U: HCPCS | Performed by: ORTHOPAEDIC SURGERY

## 2022-10-25 NOTE — PROGRESS NOTES
Chief Complaint   Patient presents with    Ankle Pain     Left Ankle ORIF, DOS 07/25/2022, is in walking boot with a cane. HPI:    Patient is 67 y.o. female here today for 3-month follow-up after left ankle ORIF. Date of surgery was 7/25/2022. Patient has done well with home therapy and has been using cast boot. Patient is somewhat hesitant to put weight on it at this time even in cast boot. Otherwise doing well with no new complaints. ROS:    Skin: (-) rash,(-) psoriasis,(-) eczema, (-)skin cancer. Neurologic: (-)numbness, (-)tingling, (-)headaches, (-) LOC. Cardiovascular: (-) Chest pain, (-) swelling in legs/feet, (-) SOB, (-) cramping in legs/feet with walking.     All other review of systems negative except stated above or in HPI      Past Medical History:   Diagnosis Date    Hypertension     PONV (postoperative nausea and vomiting)      Past Surgical History:   Procedure Laterality Date    ANKLE FRACTURE SURGERY Left 7/20/2022    LEFT ANKLE EXTERNAL FIXATOR performed by Kirit Webber DO at [facility] Left 7/25/2022    LEFT ANKLE OPEN REDUCTION INTERNAL FIXATION **DO NOT CHANGE TIME** performed by Kirit Webber DO at [facility] (CERVIX STATUS UNKNOWN)         Current Outpatient Medications:     aspirin 325 MG tablet, Take 325 mg by mouth daily, Disp: , Rfl:     Nirmatrelvir-Ritonavir (PAXLOVID PO), Take by mouth, Disp: , Rfl:     senna (SENOKOT) 8.6 MG tablet, Take 1 tablet by mouth daily, Disp: , Rfl:     amLODIPine (NORVASC) 10 MG tablet, Take 10 mg by mouth daily, Disp: , Rfl:     triamterene-hydroCHLOROthiazide (DYAZIDE) 37.5-25 MG per capsule, Take 1 capsule by mouth every morning, Disp: , Rfl:     potassium chloride (MICRO-K) 10 MEQ extended release capsule, Take 10 mEq by mouth 2 times daily, Disp: , Rfl:     calcium carbonate 600 MG TABS tablet, Take 1 tablet by mouth 2 times daily as needed, Disp: , Rfl:     Multiple Vitamins-Minerals (CENTRUM SILVER PO), Take by mouth, Disp: , Rfl:     aspirin 325 MG EC tablet, Take 1 tablet by mouth in the morning and 1 tablet before bedtime. Do all this for 28 days. , Disp: 56 tablet, Rfl: 0  Allergies   Allergen Reactions    Codeine Nausea And Vomiting     Social History     Socioeconomic History    Marital status: Single     Spouse name: Not on file    Number of children: Not on file    Years of education: Not on file    Highest education level: Not on file   Occupational History    Not on file   Tobacco Use    Smoking status: Never    Smokeless tobacco: Never   Substance and Sexual Activity    Alcohol use: Not Currently    Drug use: Never    Sexual activity: Not on file   Other Topics Concern    Not on file   Social History Narrative    Not on file     Social Determinants of Health     Financial Resource Strain: Not on file   Food Insecurity: Not on file   Transportation Needs: Not on file   Physical Activity: Not on file   Stress: Not on file   Social Connections: Not on file   Intimate Partner Violence: Not on file   Housing Stability: Not on file     No family history on file. Physical Exam:    Temp 98 °F (36.7 °C)   Ht 5' 3\" (1.6 m)   Wt 235 lb (106.6 kg)   BMI 41.63 kg/m²     GENERAL: alert, appears stated age, cooperative, no acute distress    HEENT: Head is normocephalic, atraumatic. PERRLA. SKIN: Clean, dry, intact. There is not any cellulitis or cutaneous lesions noted in the upper extremities    PULMONARY: breathing is regular and unlabored, no acute distress    CV: The bilateral upper and lower extremities are warm and well-perfused with brisk capillary refill. 2+ pulses UE and LE bilateral.     PSYCHIATRY: Pleasant mood, appropriate behavior, follows commands    NEURO: Sensation is intact distally with light touch with no alteration. Motor exam of the upper extremities show elbow flexion and extension, wrist flexion and extension, and finger abduction grossly intact 5/5.   Upper extremity reflexes are bilaterally symmetrical and within normal limits. LYMPH: No lymphedema present distally in upper or lower extremity. MUSCULOSKELETAL:  Left ankle- nontender to palpation at the area of the fracture site. ROM   stiff but intact          The incision is healed well without evidence of infection. Pulses are intact and symmetric bilaterally         Strength 4/5      Imaging:  XR ANKLE LEFT (MIN 3 VIEWS)    Result Date: 10/26/2022  EXAMINATION: THREE XRAY VIEWS OF THE LEFT ANKLE 10/25/2022 10:57 am COMPARISON: None. HISTORY: ORDERING SYSTEM PROVIDED HISTORY: Closed bimalleolar fracture of left ankle with routine healing, subsequent encounter FINDINGS: Three views of the left ankle reveal hardware identified within the distal tibia and fibula. There is diffuse soft tissue swelling. Calcaneal spurring. Ankle mortise is intact. Osteopenia the bony structures. Hardware identified along the distal tibia and fibula with continued healing of the fractures. Satisfactory alignment. Diffuse soft tissue swelling. Landen Goodrich was seen today for ankle pain. Diagnoses and all orders for this visit:    Closed bimalleolar fracture of left ankle with routine healing, subsequent encounter  -     Amb External Referral To Physical Therapy      Patient seen and examined. Imaging reviewed with patient in detail. Natural history and course discussed with patient in long discussion  Treatment options discussed with patient in detail including risks and benefits. Patient should do well with conservative management as patient would like to avoid surgery at this time. In a 15 minute assessment and discussion, patient was provided and fitted for a removable lace up ankle brace distributed and applied. She was educated in detail how to use brace and the importance of use as well as range of motion and HEP exercises.            Fritz Martin, DO

## 2022-10-25 NOTE — LETTER
[address] [facility] - [place], [place] [address]  Phone: [phone]  Fax: [phone] [facility],          October 26, 2022     Patient: Liz Jones   YOB: 1950   Date of Visit: 10/25/2022       To Whom It May Concern: It is my medical opinion that Eduardo De La Cruz requires a disability parking placard for the following reasons:  She cannot walk without assistance from another person or the use of an assistance device (cane, crutch, prosthetic device, wheelchair, etc.). Duration of need: 1 year    If you have any questions or concerns, please don't hesitate to call.     Sincerely,        Pedro Posadas, DO

## 2022-10-28 ENCOUNTER — EVALUATION (OUTPATIENT)
Dept: PHYSICAL THERAPY | Age: 72
End: 2022-10-28
Payer: MEDICARE

## 2022-10-28 DIAGNOSIS — S82.842A CLOSED BIMALLEOLAR FRACTURE OF LEFT ANKLE, INITIAL ENCOUNTER: Primary | ICD-10-CM

## 2022-10-28 PROCEDURE — 97161 PT EVAL LOW COMPLEX 20 MIN: CPT | Performed by: PHYSICAL THERAPIST

## 2022-10-28 PROCEDURE — 97110 THERAPEUTIC EXERCISES: CPT | Performed by: PHYSICAL THERAPIST

## 2022-10-28 NOTE — PROGRESS NOTES
.Physical Therapy Daily Treatment Note    Date: 10/28/2022  Patient Name: Yony Jean  : 1950   MRN: [id number]  DOInjury: 2022   DOSx: 2022  Referring Provider: Hilda Ya DO  [id number] [facility]:     I58.028H (ICD-10-CM) - Closed bimalleolar fracture of left ankle with routine healing, subsequent encounter    Outcome Measure:  LEFS 63%        X = TO BE PERFORMED NEXT VISIT  > = PROGRESS TO THIS    S: See eval  O: Discussed anatomy, physiology, body mechanics, principles of loading, and progressive loading/activity. Reviewed home exercise program extensively; written copy provided. Access Code: [email or web address]  URL: [email or web address]  Date: 10/28/2022  Prepared by: Jayro Lugo    Exercises  Ankle Pumps in Elevation - 2 x daily - 7 x weekly - 2 sets - 50 reps  Ankle Circles in Elevation - 2 x daily - 7 x weekly - 2 sets - 50 reps  Active Straight Leg Raise with Quad Set - 2 x daily - 7 x weekly - 2 sets - 15 reps - 2 sec hold  Standing Marching - 2 x daily - 7 x weekly - 2 sets - 20 reps  Standing Heel Raise - 2 x daily - 7 x weekly - 2 sets - 20 reps  Seated Long Arc Quad - 2 x daily - 7 x weekly - 2 sets - 15 reps - 5 sec hold      Time 9146-0258     Visit 1 Repeat outcome measure at mid point and end. Pain Pain at rest 2/10     ROM AROM: 5° Dorsiflexion,  20° Plantarflexion, 10° Inversion, 5° Eversion  PROM: 10° Dorsiflexion,  25° Plantarflexion, 15° Inversion, 10° Eversion     Modalities         MO   Manual            Stretch      Towel / strap DF, INV, EV   TE      TE   Exercise      Ball / can rolling   TE   Toe curls      Heel slides   TE   QS   TE   SLR   TE   SAQ   TE   [] TG  [] Leg Press 2-leg   TE   [] TG  [] Leg Press 1-leg   TE   Hamstring Curl Machine   TE   Knee Extension Machine   TE   Step-ups - FWD   TA   Step-ups - LAT   TA   Step-ups - BWD   TA   Calf Raises   TA      TA      TA               A:  Tolerated well.     P: Continue with rehab plan  Dalton Cason, PT    Treatment Charges: Mins Units   Initial Evaluation 30 1   Re-Evaluation     Ther Exercise         TE 30 2   Manual Therapy     MT     Ther Activities        TA     Gait Training          GT     Neuro Re-education NR     Modalities     Non-Billable Service Time     Other     Total Time/Units 60 3

## 2022-10-28 NOTE — PROGRESS NOTES
[facility] OUTPATIENT REHABILITATION  PHYSICAL THERAPY INITIAL EVALUATION         Date:  10/28/2022   Patient: Sarbjit Madrid  : 1950  MRN: [id number]  Referring Provider: Chantelle Epperson DO   [address], [address]     Medical Diagnosis:     S82.842D (ICD-10-CM) - Closed bimalleolar fracture of left ankle with routine healing, subsequent encounter    Physician Order: Eval and Treat     SUBJECTIVE:     Onset date: 2022    Onset: Sudden     History / Mechanism of Injury: fell off porch at home    Interventions for current problem: ORIF 2022    Chief complaint: pain, difficulty walking    Behavior: condition is getting better    Pain: intermittent  Current: 2/10     Best: 0/10     Worst:6/10 (occurs with walking). Symptom Type / Quality: aching  Location[de-identified] Ankle: medial side and lateral side      Aggravated by: walking in mornings    Relieved by: rest, reduced weightbearing    Imaging results: XR ANKLE LEFT (MIN 3 VIEWS)    Result Date: 10/26/2022  EXAMINATION: THREE XRAY VIEWS OF THE LEFT ANKLE 10/25/2022 10:57 am COMPARISON: None. HISTORY: ORDERING SYSTEM PROVIDED HISTORY: Closed bimalleolar fracture of left ankle with routine healing, subsequent encounter FINDINGS: Three views of the left ankle reveal hardware identified within the distal tibia and fibula. There is diffuse soft tissue swelling. Calcaneal spurring. Ankle mortise is intact. Osteopenia the bony structures. Hardware identified along the distal tibia and fibula with continued healing of the fractures. Satisfactory alignment. Diffuse soft tissue swelling.        Past Medical History:  Past Medical History:   Diagnosis Date    Hypertension     PONV (postoperative nausea and vomiting)      Past Surgical History:   Procedure Laterality Date    ANKLE FRACTURE SURGERY Left 2022    LEFT ANKLE EXTERNAL FIXATOR performed by Chantelle Epperson DO at [facility] 7/25/2022    LEFT ANKLE OPEN REDUCTION INTERNAL FIXATION **DO NOT CHANGE TIME** performed by Emily Kennedy DO at [place] [address] (CERVIX STATUS UNKNOWN)         Medications:   Current Outpatient Medications   Medication Sig Dispense Refill    aspirin 325 MG tablet Take 325 mg by mouth daily      Nirmatrelvir-Ritonavir (PAXLOVID PO) Take by mouth      senna (SENOKOT) 8.6 MG tablet Take 1 tablet by mouth daily      aspirin 325 MG EC tablet Take 1 tablet by mouth in the morning and 1 tablet before bedtime. Do all this for 28 days. 56 tablet 0    amLODIPine (NORVASC) 10 MG tablet Take 10 mg by mouth daily      triamterene-hydroCHLOROthiazide (DYAZIDE) 37.5-25 MG per capsule Take 1 capsule by mouth every morning      potassium chloride (MICRO-K) 10 MEQ extended release capsule Take 10 mEq by mouth 2 times daily      calcium carbonate 600 MG TABS tablet Take 1 tablet by mouth 2 times daily as needed      Multiple Vitamins-Minerals (CENTRUM SILVER PO) Take by mouth       No current facility-administered medications for this visit. Occupation: retired. [employer]    Exercise regimen: prior to pandemic lifted weights at [employer] at [employer]: exercise    Patient Goals: return to exercise regimen / fitness program    Precautions/Contraindications: recent surgery    OBJECTIVE:     Estimated body mass index is 41.63 kg/m² as calculated from the following:    Height as of 10/25/22: 5' 3\" (1.6 m). Weight as of 10/25/22: 235 lb (106.6 kg). Observations: well nourished female, normal affect    Inspection: normal orthopedic exam    Edema: moderate  global    Gait: antalgic gait, limp L LE, ambulates with cane    Joint/Motion:    Ankle:  Right:   AROM: WNL  PROM: WNL  Left: marked stiffness rene in plantarflexion  AROM: 5° Dorsiflexion,  20° Plantarflexion, 10° Inversion, 5° Eversion  PROM: 10° Dorsiflexion,  25° Plantarflexion, 15° Inversion, 10° Eversion    Strength:     Ankle:  Right: Dorsiflexion 5/5, Plantarflexion 5/5, Inversion 5/5, Eversion 5/5  Left: Dorsiflexion 4/5, Plantarflexion 4/5, Inversion 4/5, Eversion 4/5    Palpation: Non-tender to palpation lateral malleolus , medial malleolus . Special Tests/Functional Screens:    [] Anterior Drawer []+ / [] -  [] Eversion Stress: []+ / [] -  [] Hoffman Test []+ / [] -     [] Tib-Fib Compression Test []+ / [] -    [] Inversion Stress []+ / [] -     [] Squeeze Test []+ / [] -   [x] Vaishnavi's Sign []+ / [x] -   [] Other: []+ / []      Special test comments:       Brie Delarosa has a very stiff L ankle. We will start with ROM exercises progressing to strengthening, gait, and balance. Outcome Measure:   Lower Extremity Functional Scale (LEFS) 63% impairment    Problems:   Pain 6/10 intermittent  Edema moderate  ROM decreased  Strength decreased   Limitations with walking, use of left lower extremity, limited tolerance to weightbearing tasks and weightbearing duration      [x] There are no barriers affecting plan of care or recovery    [] Barriers to this patient's plan of care or recovery include:     Domestic Concerns:  [x] No  [] Yes:    Short Term goals (2-3 weeks)  Pain 3/10  ROM increase 5-10deg  Strength 4+/5   Able to perform / complete the following functions / tasks: ambulate without assistive device    Long Term goals (8 weeks)  Pain 1/10  ROM WNL  Strength 5-/5  Able to perform / complete the following functions / tasks: normal gait, normal stair negotiation   LEFS 30% impairment  Independent with home exercise program (HEP)    Rehab Potential: [x] Good  [] Fair  [] Poor    PLAN       Physical therapy plan of care is established based on physician order, patient diagnosis, and clinical assessment.     Treatment Plan:   instruction in home exercise program   therapeutic exercise   therapeutic activity   neuromuscular re-education   gait training   balance training   manual therapy   soft tissue mobilization     The following CPT codes are likely to be used in the care of this patient:   10997 PT Evaluation: Moderate Complexity   16582 PT Re-Evaluation   04169 Therapeutic Exercise   87319 Neuromuscular Re-Education   53970 Therapeutic Activities   10093 Manual Therapy     Suggested Professional Referral: [x] No  [] Yes:     Patient Education:  [x] Plans / Goals, Risks / Benefits discussed  [x] Home exercise program  Method of Education: [x] Verbal  [x] Demo  [x] Written  Comprehension of Education:  [x] Verbalizes understanding. [x] Demonstrates understanding. [] Needs Review. [] Demonstrates / verbalizes understanding of HEP/Ed previously given. Frequency: 1-2 days per week for 8 weeks    Patient understands diagnosis/prognosis and consents to treatment, plan and goals: [x] Yes    [] No     Thank you for the opportunity to work with your patient. If you have questions or comments, please contact me at [phone]; fax: [phone]. Electronically signed by: Reynaldo Huynh PT         Please sign Physician's Certification and return to: [address] [place] [address] PHYSICAL THERAPY  [address] [facility] [address]  Dept: [phone]  Dept Fax: [phone]  Loc: [phone] Certification / Comments     Frequency/Duration 1-2 days per week for 8 weeks. Certification period from 10/28/2022 to 1/22/2023. I have reviewed the Plan of Care established for skilled therapy services and certify that the services are required and that they will be provided while the patient is under my care.     Physician's Comments/Revisions:               Physician's Printed Name:                                           [de-identified] Signature:                                                               Date:

## 2022-10-31 ENCOUNTER — TREATMENT (OUTPATIENT)
Dept: PHYSICAL THERAPY | Age: 72
End: 2022-10-31
Payer: MEDICARE

## 2022-10-31 DIAGNOSIS — S82.842A CLOSED BIMALLEOLAR FRACTURE OF LEFT ANKLE, INITIAL ENCOUNTER: Primary | ICD-10-CM

## 2022-10-31 PROCEDURE — 97110 THERAPEUTIC EXERCISES: CPT

## 2022-10-31 PROCEDURE — 97530 THERAPEUTIC ACTIVITIES: CPT

## 2022-10-31 NOTE — PROGRESS NOTES
.Physical Therapy Daily Treatment Note    Date: 10/31/2022  Patient Name: Meño Cruz  : 1950   MRN: [id number]  DOInjury: 2022   DOSx: 2022  Referring Provider: Reema Saldaña DO  [address] Diagnosis:     S44.771Y (ICD-10-CM) - Closed bimalleolar fracture of left ankle with routine healing, subsequent encounter    Outcome Measure:  LEFS 63%        X = TO BE PERFORMED NEXT VISIT  > = PROGRESS TO THIS    S: pt reports still having swelling / soreness . O: Discussed anatomy, physiology, body mechanics, principles of loading, and progressive loading/activity. Reviewed home exercise program extensively; written copy provided. Access Code: [email or web address]  URL: [email or web address]  Date: 10/28/2022  Prepared by: [place]    Exercises  Ankle Pumps in Elevation - 2 x daily - 7 x weekly - 2 sets - 50 reps  Ankle Circles in Elevation - 2 x daily - 7 x weekly - 2 sets - 50 reps  Active Straight Leg Raise with Quad Set - 2 x daily - 7 x weekly - 2 sets - 15 reps - 2 sec hold  Standing Marching - 2 x daily - 7 x weekly - 2 sets - 20 reps  Standing Heel Raise - 2 x daily - 7 x weekly - 2 sets - 20 reps  Seated Long Arc Quad - 2 x daily - 7 x weekly - 2 sets - 15 reps - 5 sec hold      Time 1247-6658     Visit  Repeat outcome measure at mid point and end.     Pain Pain at rest 5/10     ROM AROM: 5° Dorsiflexion,  20° Plantarflexion, 10° Inversion, 5° Eversion  PROM: 10° Dorsiflexion,  25° Plantarflexion, 15° Inversion, 10° Eversion     Modalities         MO   Manual            Stretch      Nustep  L 5 x 10 min seat no. 9     Towel / strap DF, INV, EV   TE      TE   Exercise      BAPS board  L 2 X 20 all motions      Pro stretch  X 20     Ball / can rolling   TE   Toe curls      Heel slides   TE   QS   TE   SLR   TE   SAQ   TE   [] TG  [] Leg Press 2-leg   TE   [] TG  [] Leg Press 1-leg   TE   Hamstring Curl Machine   TE   Knee Extension Machine   TE Step-ups - FWD   TA   Step-ups - LAT   TA   Step-ups - BWD   TA         Calf Raises X 20 new   TA   Marching  X 20 new   TA   Hip ABD  X 20 new   TA               A:  Tolerated well.     P: Continue with rehab plan  Ulises Grace PTA    Treatment Charges: Mins Units   Initial Evaluation     Re-Evaluation     Ther Exercise         TE 30 2   Manual Therapy     MT     Ther Activities        TA 10 1   Gait Training          GT     Neuro Re-education NR     Modalities     Non-Billable Service Time     Other     Total Time/Units 40 3

## 2022-11-03 ENCOUNTER — TREATMENT (OUTPATIENT)
Dept: PHYSICAL THERAPY | Age: 72
End: 2022-11-03
Payer: MEDICARE

## 2022-11-03 DIAGNOSIS — S82.842A CLOSED BIMALLEOLAR FRACTURE OF LEFT ANKLE, INITIAL ENCOUNTER: Primary | ICD-10-CM

## 2022-11-03 PROCEDURE — 97530 THERAPEUTIC ACTIVITIES: CPT

## 2022-11-03 PROCEDURE — 97110 THERAPEUTIC EXERCISES: CPT

## 2022-11-03 NOTE — PROGRESS NOTES
.Physical Therapy Daily Treatment Note    Date: 11/3/2022  Patient Name: Eboni Loja  : 1950   MRN: [id number]  DOInjury: 2022   DOSx: 2022  Referring Provider: Stephanie Garcia DO  [address]:     Z38.748Q (ICD-10-CM) - Closed bimalleolar fracture of left ankle with routine healing, subsequent encounter    Outcome Measure:  LEFS 63%        X = TO BE PERFORMED NEXT VISIT  > = PROGRESS TO THIS    S: pt reports still having swelling / soreness . O: Discussed anatomy, physiology, body mechanics, principles of loading, and progressive loading/activity. Reviewed home exercise program extensively; written copy provided. Access Code: [email or web address]  URL: [email or web address]  Date: 10/28/2022  Prepared by: Raj Masters    Exercises  Ankle Pumps in Elevation - 2 x daily - 7 x weekly - 2 sets - 50 reps  Ankle Circles in Elevation - 2 x daily - 7 x weekly - 2 sets - 50 reps  Active Straight Leg Raise with Quad Set - 2 x daily - 7 x weekly - 2 sets - 15 reps - 2 sec hold  Standing Marching - 2 x daily - 7 x weekly - 2 sets - 20 reps  Standing Heel Raise - 2 x daily - 7 x weekly - 2 sets - 20 reps  Seated Long Arc Quad - 2 x daily - 7 x weekly - 2 sets - 15 reps - 5 sec hold      Time 9288-9071      Visit 3 /16 Repeat outcome measure at mid point and end.     Pain Pain at rest 5/10     ROM AROM: 5° Dorsiflexion,  20° Plantarflexion, 10° Inversion, 5° Eversion  PROM: 10° Dorsiflexion,  25° Plantarflexion, 15° Inversion, 10° Eversion     Modalities         MO   Manual            Stretch      Nustep  L 5 x 10 min seat no. 9     Towel / strap DF, INV, EV   TE      TE   Exercise      BAPS board  L 2 X 20 all motions      Pro stretch  X 20     Ball / can rolling   TE   Toe curls      Heel slides   TE   DF stretch  X 20 using black t band  Pt given t band for HEP 2022     QS   TE   SLR   TE   SAQ   TE   [] TG  [] Leg Press 2-leg   TE   [] TG  [] Leg Press 1-leg   TE   Hamstring Curl Machine   TE   Knee Extension Machine   TE   Step-ups - FWD 4\" X 20 new   TA   Step-ups - LAT 4\" X 20 new   TA   Step-ups - BWD   TA         Calf Raises X 20   TA   Marching  X 20   TA   Hip ABD  X 20   TA               A:  Tolerated well. Pt able to tolerate newly added ex's as listed .   P: Continue with rehab plan  Gina Franco PTA    Treatment Charges: Mins Units   Initial Evaluation     Re-Evaluation     Ther Exercise         TE 30 2   Manual Therapy     MT     Ther Activities        TA 10 1   Gait Training          GT     Neuro Re-education NR     Modalities     Non-Billable Service Time     Other     Total Time/Units 40 3

## 2022-11-08 ENCOUNTER — TREATMENT (OUTPATIENT)
Dept: PHYSICAL THERAPY | Age: 72
End: 2022-11-08
Payer: MEDICARE

## 2022-11-08 DIAGNOSIS — S82.842A CLOSED BIMALLEOLAR FRACTURE OF LEFT ANKLE, INITIAL ENCOUNTER: Primary | ICD-10-CM

## 2022-11-08 PROCEDURE — 97530 THERAPEUTIC ACTIVITIES: CPT

## 2022-11-08 PROCEDURE — 97110 THERAPEUTIC EXERCISES: CPT

## 2022-11-08 NOTE — PROGRESS NOTES
.Physical Therapy Daily Treatment Note    Date: 2022  Patient Name: Yuliana Shine  : 1950   MRN: [id number]  DOInjury: 2022   DOSx: 2022  Referring Provider: Mamadou Mclain DO  [address] [facility] Diagnosis:     Q92.388K (ICD-10-CM) - Closed bimalleolar fracture of left ankle with routine healing, subsequent encounter    Outcome Measure:  LEFS 63%        X = TO BE PERFORMED NEXT VISIT  > = PROGRESS TO THIS    S: pt reports still having swelling / soreness . O: Discussed anatomy, physiology, body mechanics, principles of loading, and progressive loading/activity. Reviewed home exercise program extensively; written copy provided. Access Code: [email or web address]  URL: [email or web address]  Date: 10/28/2022  Prepared by: Giuseppe Ambriz    Exercises  Ankle Pumps in Elevation - 2 x daily - 7 x weekly - 2 sets - 50 reps  Ankle Circles in Elevation - 2 x daily - 7 x weekly - 2 sets - 50 reps  Active Straight Leg Raise with Quad Set - 2 x daily - 7 x weekly - 2 sets - 15 reps - 2 sec hold  Standing Marching - 2 x daily - 7 x weekly - 2 sets - 20 reps  Standing Heel Raise - 2 x daily - 7 x weekly - 2 sets - 20 reps  Seated Long Arc Quad - 2 x daily - 7 x weekly - 2 sets - 15 reps - 5 sec hold      Time 2264-3663     Visit  Repeat outcome measure at mid point and end.     Pain Pain at rest 5/10     ROM AROM: 5° Dorsiflexion,  20° Plantarflexion, 10° Inversion, 5° Eversion  PROM: 10° Dorsiflexion,  25° Plantarflexion, 15° Inversion, 10° Eversion     Modalities         MO   Manual            Stretch      Nustep  L 5 x 10 min seat no. 8     Towel / strap DF, INV, EV   TE      TE   Exercise      BAPS board  L 2 X 20 all motions      Pro stretch  X 20     Ball / can rolling   TE   Toe curls      Heel slides   TE   DF stretch  X 20 using black t band  Pt given t band for HEP 2022     QS   TE   SLR   TE   SAQ   TE   [] TG  [] Leg Press 2-leg   TE   [] TG  [] Leg Press 1-leg   TE   Hamstring Curl Machine   TE   Knee Extension Machine   TE   Step-ups - FWD 4\" X 20   TA   Step-ups - LAT 4\" X 20   TA   Step-ups - BWD   TA         Calf Raises X 20   TA   Marching  X 20   TA   Hip ABD  X 20   TA               A:  Tolerated well.    P: Continue with rehab plan  [place], PTA    Treatment Charges: Mins Units   Initial Evaluation     Re-Evaluation     Ther Exercise         TE 30 2   Manual Therapy     MT     Ther Activities        TA 10 1   Gait Training          GT     Neuro Re-education NR     Modalities     Non-Billable Service Time     Other     Total Time/Units 40 3

## 2022-11-10 ENCOUNTER — TREATMENT (OUTPATIENT)
Dept: PHYSICAL THERAPY | Age: 72
End: 2022-11-10
Payer: MEDICARE

## 2022-11-10 DIAGNOSIS — S82.842A CLOSED BIMALLEOLAR FRACTURE OF LEFT ANKLE, INITIAL ENCOUNTER: Primary | ICD-10-CM

## 2022-11-10 PROCEDURE — 97110 THERAPEUTIC EXERCISES: CPT

## 2022-11-10 PROCEDURE — 97530 THERAPEUTIC ACTIVITIES: CPT

## 2022-11-10 NOTE — PROGRESS NOTES
[] Leg Press 1-leg   TE   Total gym calf raises  L 15 2 x 15     Hamstring Curl Machine   TE   Knee Extension Machine   TE   Step-ups - FWD 4\" X 20   TA   Step-ups - LAT 4\" X 20   TA   Step-ups - BWD   TA         Calf Raises X 20   TA   Marching  X 20   TA   Hip ABD  X 20   TA               A:  Tolerated well.    P: Continue with rehab plan  Charo Pacheco PTA    Treatment Charges: Mins Units   Initial Evaluation     Re-Evaluation     Ther Exercise         TE 30 2   Manual Therapy     MT     Ther Activities        TA 10 1   Gait Training          GT     Neuro Re-education NR     Modalities     Non-Billable Service Time     Other     Total Time/Units 40 3

## 2022-11-14 ENCOUNTER — TELEPHONE (OUTPATIENT)
Dept: ORTHOPEDIC SURGERY | Age: 72
End: 2022-11-14

## 2022-11-14 NOTE — TELEPHONE ENCOUNTER
Patrick Castillo called. She had an ultra sound done and has a blood clot in her left leg. Her family doctor is treating her and told her to stop physical therapy for now.

## 2022-11-28 DIAGNOSIS — S82.842D CLOSED BIMALLEOLAR FRACTURE OF LEFT ANKLE WITH ROUTINE HEALING, SUBSEQUENT ENCOUNTER: Primary | ICD-10-CM

## 2022-11-29 ENCOUNTER — OFFICE VISIT (OUTPATIENT)
Dept: ORTHOPEDIC SURGERY | Age: 72
End: 2022-11-29

## 2022-11-29 VITALS — TEMPERATURE: 98 F | WEIGHT: 235 LBS | BODY MASS INDEX: 41.64 KG/M2 | HEIGHT: 63 IN

## 2022-11-29 DIAGNOSIS — Z78.9 ACTIVITY OF DAILY LIVING ALTERATION: ICD-10-CM

## 2022-11-29 DIAGNOSIS — M25.672 ANKLE STIFFNESS, LEFT: ICD-10-CM

## 2022-11-29 DIAGNOSIS — S82.842D CLOSED BIMALLEOLAR FRACTURE OF LEFT ANKLE WITH ROUTINE HEALING, SUBSEQUENT ENCOUNTER: Primary | ICD-10-CM

## 2022-11-29 NOTE — PROGRESS NOTES
Chief Complaint   Patient presents with    Ankle Pain     Left Ankle ORIF DOS 07/25/2022, stopped therapy due to a blood clot. HPI:    Patient is 67 y.o. female here today for 3-month follow-up after left ankle ORIF. Date of surgery was 7/25/2022. Patient has done well with home therapy and has been out of cast boot. Patient is somewhat hesitant to put weight on since last visit. She has been doing much better in regards to this but was found to have a blood clot at one of her physical therapy visits. She is currently in treatment for this from her primary care physician. Otherwise doing well with no new complaints. ROS:    Skin: (-) rash,(-) psoriasis,(-) eczema, (-)skin cancer. Neurologic: (-)numbness, (-)tingling, (-)headaches, (-) LOC. Cardiovascular: (-) Chest pain, (-) swelling in legs/feet, (-) SOB, (-) cramping in legs/feet with walking.     All other review of systems negative except stated above or in HPI      Past Medical History:   Diagnosis Date    Hypertension     PONV (postoperative nausea and vomiting)      Past Surgical History:   Procedure Laterality Date    ANKLE FRACTURE SURGERY Left 7/20/2022    LEFT ANKLE EXTERNAL FIXATOR performed by Cyril Kc DO at [address] [place] [address] Left 7/25/2022    LEFT ANKLE OPEN REDUCTION INTERNAL FIXATION **DO NOT CHANGE TIME** performed by Cyril Kc DO at [place] [address] (CERVIX STATUS UNKNOWN)         Current Outpatient Medications:     aspirin 325 MG tablet, Take 325 mg by mouth daily, Disp: , Rfl:     Nirmatrelvir-Ritonavir (PAXLOVID PO), Take by mouth, Disp: , Rfl:     senna (SENOKOT) 8.6 MG tablet, Take 1 tablet by mouth daily, Disp: , Rfl:     amLODIPine (NORVASC) 10 MG tablet, Take 10 mg by mouth daily, Disp: , Rfl:     triamterene-hydroCHLOROthiazide (DYAZIDE) 37.5-25 MG per capsule, Take 1 capsule by mouth every morning, Disp: , Rfl:     potassium chloride (MICRO-K) 10 MEQ extended release capsule, Take 10 mEq by mouth 2 times daily, Disp: , Rfl:     calcium carbonate 600 MG TABS tablet, Take 1 tablet by mouth 2 times daily as needed, Disp: , Rfl:     Multiple Vitamins-Minerals (CENTRUM SILVER PO), Take by mouth, Disp: , Rfl:     aspirin 325 MG EC tablet, Take 1 tablet by mouth in the morning and 1 tablet before bedtime. Do all this for 28 days. , Disp: 56 tablet, Rfl: 0  Allergies   Allergen Reactions    Codeine Nausea And Vomiting     Social History     Socioeconomic History    Marital status: Single     Spouse name: Not on file    Number of children: Not on file    Years of education: Not on file    Highest education level: Not on file   Occupational History    Not on file   Tobacco Use    Smoking status: Never    Smokeless tobacco: Never   Substance and Sexual Activity    Alcohol use: Not Currently    Drug use: Never    Sexual activity: Not on file   Other Topics Concern    Not on file   Social History Narrative    Not on file     Social Determinants of Health     Financial Resource Strain: Not on file   Food Insecurity: Not on file   Transportation Needs: Not on file   Physical Activity: Not on file   Stress: Not on file   Social Connections: Not on file   Intimate Partner Violence: Not on file   Housing Stability: Not on file     No family history on file. Physical Exam:    Temp 98 °F (36.7 °C)   Ht 5' 3\" (1.6 m)   Wt 235 lb (106.6 kg)   BMI 41.63 kg/m²     GENERAL: alert, appears stated age, cooperative, no acute distress    HEENT: Head is normocephalic, atraumatic. PERRLA. SKIN: Clean, dry, intact. There is not any cellulitis or cutaneous lesions noted in the lower extremities     PULMONARY: breathing is regular and unlabored, no acute distress     CV: The bilateral lower extremities are warm and well-perfused with brisk capillary refill.  2+ pulses LE bilateral.     ABDOMINAL: Nontender, nondistended     PSYCHIATRY: Pleasant mood, appropriate behavior, follows commands     NEURO: Sensation is intact distally with light touch with no alteration. Motor exam of the lower extremities show quadriceps, hamstrings, foot dorsiflexion and plantarflexion grossly intact 5/5. LYMPH: No lymphedema present distally in upper or lower extremity. MUSCULOSKELETAL:  Left ankle- nontender to palpation at the area of the fracture site. ROM   stiff but intact          The incision is healed well without evidence of infection. Pulses are intact and symmetric bilaterally         Strength 4/5      Imaging:  XR ANKLE LEFT (MIN 3 VIEWS)    Result Date: 10/26/2022  EXAMINATION: THREE XRAY VIEWS OF THE LEFT ANKLE 10/25/2022 10:57 am COMPARISON: None. HISTORY: ORDERING SYSTEM PROVIDED HISTORY: Closed bimalleolar fracture of left ankle with routine healing, subsequent encounter FINDINGS: Three views of the left ankle reveal hardware identified within the distal tibia and fibula. There is diffuse soft tissue swelling. Calcaneal spurring. Ankle mortise is intact. Osteopenia the bony structures. Hardware identified along the distal tibia and fibula with continued healing of the fractures. Satisfactory alignment. Diffuse soft tissue swelling. XR ANKLE LEFT (MIN 3 VIEWS)    Result Date: 11/30/2022  EXAMINATION: THREE XRAY VIEWS OF THE LEFT ANKLE 11/29/2022 10:40 am COMPARISON: Left ankle three views, 10/25/2022 HISTORY: ORDERING SYSTEM PROVIDED HISTORY: Closed bimalleolar fracture of left ankle with routine healing, subsequent encounter FINDINGS: The bones are osteopenic. There has been no significant interval change in the screw and plate device bridging distal fibula/lateral malleolus and a single screw bridges the medial malleolus. There is no evidence for perihardware fracture or loosening. The ankle mortise is appropriately aligned. There has been interval decreased soft tissue swelling of the ankle and hindfoot. Intact bilateral malleolar hardware fixation, as detailed above.  Decreased soft tissue swelling. Cain Ricketts was seen today for ankle pain. Diagnoses and all orders for this visit:    Closed bimalleolar fracture of left ankle with routine healing, subsequent encounter    Ankle stiffness, left    Activity of daily living alteration        Patient seen and examined. Imaging as well as patient's chart reviewed with patient in detail. Natural history and course discussed with patient in long discussion  Treatment options discussed with patient in detail including risks and benefits. Patient should do well with continued conservative management. Patient will be postponing formal physical therapy until further into treatment of her blood clot. I determined that patient follow-up and 4 to 6 weeks and hopeful return to formal therapy at that time. She will continue with HEP still however. In a 15 minute assessment and discussion, patient was counseled on weight loss, healthy diet, and physical activity relating to this condition. She was educated with options in detail including nutrition, joining a health club/ weight loss program, and use of cardio equipment such as the Arc Trainer and the importance of use as well as range of motion and HEP exercises for weight loss and general health. Sena Lisa DO            25 minutes was spent with patient. 50% or greater was spent counseling the patient.

## 2023-01-13 DIAGNOSIS — S82.842D CLOSED BIMALLEOLAR FRACTURE OF LEFT ANKLE WITH ROUTINE HEALING, SUBSEQUENT ENCOUNTER: Primary | ICD-10-CM

## 2023-01-17 ENCOUNTER — OFFICE VISIT (OUTPATIENT)
Dept: ORTHOPEDIC SURGERY | Age: 73
End: 2023-01-17
Payer: MEDICARE

## 2023-01-17 VITALS — WEIGHT: 230 LBS | BODY MASS INDEX: 40.75 KG/M2 | HEIGHT: 63 IN | TEMPERATURE: 98 F

## 2023-01-17 DIAGNOSIS — Z78.9 ACTIVITY OF DAILY LIVING ALTERATION: ICD-10-CM

## 2023-01-17 DIAGNOSIS — S82.842D CLOSED BIMALLEOLAR FRACTURE OF LEFT ANKLE WITH ROUTINE HEALING, SUBSEQUENT ENCOUNTER: Primary | ICD-10-CM

## 2023-01-17 DIAGNOSIS — M25.672 ANKLE STIFFNESS, LEFT: ICD-10-CM

## 2023-01-17 PROCEDURE — G8484 FLU IMMUNIZE NO ADMIN: HCPCS | Performed by: ORTHOPAEDIC SURGERY

## 2023-01-17 PROCEDURE — 3017F COLORECTAL CA SCREEN DOC REV: CPT | Performed by: ORTHOPAEDIC SURGERY

## 2023-01-17 PROCEDURE — 1090F PRES/ABSN URINE INCON ASSESS: CPT | Performed by: ORTHOPAEDIC SURGERY

## 2023-01-17 PROCEDURE — G8427 DOCREV CUR MEDS BY ELIG CLIN: HCPCS | Performed by: ORTHOPAEDIC SURGERY

## 2023-01-17 PROCEDURE — 1123F ACP DISCUSS/DSCN MKR DOCD: CPT | Performed by: ORTHOPAEDIC SURGERY

## 2023-01-17 PROCEDURE — 1036F TOBACCO NON-USER: CPT | Performed by: ORTHOPAEDIC SURGERY

## 2023-01-17 PROCEDURE — G8399 PT W/DXA RESULTS DOCUMENT: HCPCS | Performed by: ORTHOPAEDIC SURGERY

## 2023-01-17 PROCEDURE — 99213 OFFICE O/P EST LOW 20 MIN: CPT | Performed by: ORTHOPAEDIC SURGERY

## 2023-01-17 PROCEDURE — G8417 CALC BMI ABV UP PARAM F/U: HCPCS | Performed by: ORTHOPAEDIC SURGERY

## 2023-01-17 NOTE — PROGRESS NOTES
Chief Complaint   Patient presents with    Ankle Pain     Left ankle ORIF needs only bothers her when shes moving and pops every now and then. HPI:    Patient is 67 y.o. female here today for follow-up after left ankle ORIF. Date of surgery was 7/25/2022. Patient has done well with home therapy and has been out of cast boot. Patient is somewhat hesitant to put weight on since last visit. She has been doing much better in regards to this but was found to have a blood clot at one of her physical therapy visits. She is currently in treatment for this from her primary care physician. Otherwise doing well with no new complaints. ROS:    Skin: (-) rash,(-) psoriasis,(-) eczema, (-)skin cancer. Neurologic: (-)numbness, (-)tingling, (-)headaches, (-) LOC. Cardiovascular: (-) Chest pain, (-) swelling in legs/feet, (-) SOB, (-) cramping in legs/feet with walking. All other review of systems negative except stated above or in HPI      Past Medical History:   Diagnosis Date    Hypertension     PONV (postoperative nausea and vomiting)      Past Surgical History:   Procedure Laterality Date    ANKLE FRACTURE SURGERY Left 7/20/2022    LEFT ANKLE EXTERNAL FIXATOR performed by Aster Ronquillo DO at [facility] Left 7/25/2022    LEFT ANKLE OPEN REDUCTION INTERNAL FIXATION **DO NOT CHANGE TIME** performed by Aster Ronquillo DO at [facility] (CERVIX STATUS UNKNOWN)         Current Outpatient Medications:     aspirin 325 MG tablet, Take 325 mg by mouth daily, Disp: , Rfl:     Nirmatrelvir-Ritonavir (PAXLOVID PO), Take by mouth, Disp: , Rfl:     senna (SENOKOT) 8.6 MG tablet, Take 1 tablet by mouth daily, Disp: , Rfl:     aspirin 325 MG EC tablet, Take 1 tablet by mouth in the morning and 1 tablet before bedtime. Do all this for 28 days. , Disp: 56 tablet, Rfl: 0    amLODIPine (NORVASC) 10 MG tablet, Take 10 mg by mouth daily, Disp: , Rfl:     triamterene-hydroCHLOROthiazide (DYAZIDE) 37.5-25 MG per capsule, Take 1 capsule by mouth every morning, Disp: , Rfl:     potassium chloride (MICRO-K) 10 MEQ extended release capsule, Take 10 mEq by mouth 2 times daily, Disp: , Rfl:     calcium carbonate 600 MG TABS tablet, Take 1 tablet by mouth 2 times daily as needed, Disp: , Rfl:     Multiple Vitamins-Minerals (CENTRUM SILVER PO), Take by mouth, Disp: , Rfl:   Allergies   Allergen Reactions    Codeine Nausea And Vomiting     Social History     Socioeconomic History    Marital status: Single     Spouse name: Not on file    Number of children: Not on file    Years of education: Not on file    Highest education level: Not on file   Occupational History    Not on file   Tobacco Use    Smoking status: Never    Smokeless tobacco: Never   Substance and Sexual Activity    Alcohol use: Not Currently    Drug use: Never    Sexual activity: Not on file   Other Topics Concern    Not on file   Social History Narrative    Not on file     Social Determinants of Health     Financial Resource Strain: Not on file   Food Insecurity: Not on file   Transportation Needs: Not on file   Physical Activity: Not on file   Stress: Not on file   Social Connections: Not on file   Intimate Partner Violence: Not on file   Housing Stability: Not on file     No family history on file. Physical Exam:    Temp 98 °F (36.7 °C)   Ht 5' 3\" (1.6 m)   Wt 230 lb (104.3 kg)   BMI 40.74 kg/m²     GENERAL: alert, appears stated age, cooperative, no acute distress    HEENT: Head is normocephalic, atraumatic. PERRLA. SKIN: Clean, dry, intact. There is not any cellulitis or cutaneous lesions noted in the lower extremities     PULMONARY: breathing is regular and unlabored, no acute distress     CV: The bilateral lower extremities are warm and well-perfused with brisk capillary refill.  2+ pulses LE bilateral.     ABDOMINAL: Nontender, nondistended     PSYCHIATRY: Pleasant mood, appropriate behavior, follows commands     NEURO: Sensation is intact distally with light touch with no alteration. Motor exam of the lower extremities show quadriceps, hamstrings, foot dorsiflexion and plantarflexion grossly intact 5/5. LYMPH: No lymphedema present distally in upper or lower extremity. MUSCULOSKELETAL:  Left ankle- nontender to palpation at the area of the fracture site. ROM   stiff but intact          The incision is healed well without evidence of infection. Pulses are intact and symmetric bilaterally         Strength 4/5      Imaging:  XR ANKLE LEFT (MIN 3 VIEWS)    Result Date: 10/26/2022  EXAMINATION: THREE XRAY VIEWS OF THE LEFT ANKLE 10/25/2022 10:57 am COMPARISON: None. HISTORY: ORDERING SYSTEM PROVIDED HISTORY: Closed bimalleolar fracture of left ankle with routine healing, subsequent encounter FINDINGS: Three views of the left ankle reveal hardware identified within the distal tibia and fibula. There is diffuse soft tissue swelling. Calcaneal spurring. Ankle mortise is intact. Osteopenia the bony structures. Hardware identified along the distal tibia and fibula with continued healing of the fractures. Satisfactory alignment. Diffuse soft tissue swelling. XR ANKLE LEFT (MIN 3 VIEWS)    Result Date: 11/30/2022  EXAMINATION: THREE XRAY VIEWS OF THE LEFT ANKLE 11/29/2022 10:40 am COMPARISON: Left ankle three views, 10/25/2022 HISTORY: ORDERING SYSTEM PROVIDED HISTORY: Closed bimalleolar fracture of left ankle with routine healing, subsequent encounter FINDINGS: The bones are osteopenic. There has been no significant interval change in the screw and plate device bridging distal fibula/lateral malleolus and a single screw bridges the medial malleolus. There is no evidence for perihardware fracture or loosening. The ankle mortise is appropriately aligned. There has been interval decreased soft tissue swelling of the ankle and hindfoot.      Intact bilateral malleolar hardware fixation, as detailed above. Decreased soft tissue swelling. XR ANKLE LEFT (MIN 3 VIEWS)    Result Date: 1/17/2023  EXAMINATION: THREE XRAY VIEWS OF THE LEFT ANKLE 1/17/2023 11:47 am COMPARISON: 11/29/2022 HISTORY: ORDERING SYSTEM PROVIDED HISTORY: Closed bimalleolar fracture of left ankle with routine healing, subsequent encounter FINDINGS: Three views of left ankle reveal cortical plate and screws seen aligning a by malleolar fracture with screw also seen in the medial malleolus stable and unchanged. There is diffuse soft tissue swelling. Osteopenia the bony structures. The ankle mortise is intact. Stable healed fracture seen of the medial and lateral malleolus with no evidence of acute bony abnormality. No hardware complication. Aristides More was seen today for ankle pain. Diagnoses and all orders for this visit:    Closed bimalleolar fracture of left ankle with routine healing, subsequent encounter    Activity of daily living alteration    Ankle stiffness, left        Patient seen and examined. Imaging as well as patient's chart reviewed with patient in detail. Natural history and course discussed with patient in long discussion  Treatment options discussed with patient in detail including risks and benefits. Patient should do well with continued conservative management. Patient will be postponing formal physical therapy until further into treatment of her blood clot. I determined that patient follow-up and 4 to 6 weeks and hopeful return to formal therapy at that time. She will continue with HEP still however. In a 15 minute assessment and discussion, patient was counseled on weight loss, healthy diet, and physical activity relating to this condition.  She was educated with options in detail including nutrition, joining a health club/ weight loss program, and use of cardio equipment such as the Arc Trainer and the importance of use as well as range of motion and HEP exercises for weight loss and general health.              Alex Puentes DO

## 2023-02-24 DIAGNOSIS — S82.842D CLOSED BIMALLEOLAR FRACTURE OF LEFT ANKLE WITH ROUTINE HEALING, SUBSEQUENT ENCOUNTER: Primary | ICD-10-CM

## 2023-02-28 ENCOUNTER — OFFICE VISIT (OUTPATIENT)
Dept: ORTHOPEDIC SURGERY | Age: 73
End: 2023-02-28
Payer: MEDICARE

## 2023-02-28 VITALS — HEIGHT: 63 IN | WEIGHT: 230 LBS | TEMPERATURE: 98 F | BODY MASS INDEX: 40.75 KG/M2

## 2023-02-28 DIAGNOSIS — Z78.9 ACTIVITY OF DAILY LIVING ALTERATION: ICD-10-CM

## 2023-02-28 DIAGNOSIS — M25.672 ANKLE STIFFNESS, LEFT: ICD-10-CM

## 2023-02-28 DIAGNOSIS — S82.842D CLOSED BIMALLEOLAR FRACTURE OF LEFT ANKLE WITH ROUTINE HEALING, SUBSEQUENT ENCOUNTER: Primary | ICD-10-CM

## 2023-02-28 PROCEDURE — 99214 OFFICE O/P EST MOD 30 MIN: CPT | Performed by: ORTHOPAEDIC SURGERY

## 2023-02-28 PROCEDURE — G8399 PT W/DXA RESULTS DOCUMENT: HCPCS | Performed by: ORTHOPAEDIC SURGERY

## 2023-02-28 PROCEDURE — 1036F TOBACCO NON-USER: CPT | Performed by: ORTHOPAEDIC SURGERY

## 2023-02-28 PROCEDURE — G8417 CALC BMI ABV UP PARAM F/U: HCPCS | Performed by: ORTHOPAEDIC SURGERY

## 2023-02-28 PROCEDURE — G8484 FLU IMMUNIZE NO ADMIN: HCPCS | Performed by: ORTHOPAEDIC SURGERY

## 2023-02-28 PROCEDURE — G8427 DOCREV CUR MEDS BY ELIG CLIN: HCPCS | Performed by: ORTHOPAEDIC SURGERY

## 2023-02-28 PROCEDURE — 1090F PRES/ABSN URINE INCON ASSESS: CPT | Performed by: ORTHOPAEDIC SURGERY

## 2023-02-28 PROCEDURE — 1123F ACP DISCUSS/DSCN MKR DOCD: CPT | Performed by: ORTHOPAEDIC SURGERY

## 2023-02-28 PROCEDURE — 3017F COLORECTAL CA SCREEN DOC REV: CPT | Performed by: ORTHOPAEDIC SURGERY

## 2023-02-28 NOTE — PROGRESS NOTES
Chief Complaint   Patient presents with    Ankle Pain     Left ankle ORIF f/u doing better, pain is more when she gets up in the morning           HPI:    Patient is 68 y.o. female here today for follow-up after left ankle ORIF. Date of surgery was 7/25/2022. Patient is somewhat hesitant to put weight on since last visit. She has been doing much better in regards to this but was found to have a blood clot at one of her physical therapy visits. She is currently in treatment for this from her primary care physician. Otherwise doing well with no new complaints. ROS:    Skin: (-) rash,(-) psoriasis,(-) eczema, (-)skin cancer. Neurologic: (-)numbness, (-)tingling, (-)headaches, (-) LOC. Cardiovascular: (-) Chest pain, (-) swelling in legs/feet, (-) SOB, (-) cramping in legs/feet with walking.     All other review of systems negative except stated above or in HPI      Past Medical History:   Diagnosis Date    Hypertension     PONV (postoperative nausea and vomiting)      Past Surgical History:   Procedure Laterality Date    ANKLE FRACTURE SURGERY Left 7/20/2022    LEFT ANKLE EXTERNAL FIXATOR performed by Boris Perea DO at [facility] Left 7/25/2022    LEFT ANKLE OPEN REDUCTION INTERNAL FIXATION **DO NOT CHANGE TIME** performed by Boris Perea DO at [address] [place] [address] (CERVIX STATUS UNKNOWN)         Current Outpatient Medications:     aspirin 325 MG tablet, Take 325 mg by mouth daily, Disp: , Rfl:     Nirmatrelvir-Ritonavir (PAXLOVID PO), Take by mouth, Disp: , Rfl:     senna (SENOKOT) 8.6 MG tablet, Take 1 tablet by mouth daily, Disp: , Rfl:     amLODIPine (NORVASC) 10 MG tablet, Take 10 mg by mouth daily, Disp: , Rfl:     triamterene-hydroCHLOROthiazide (DYAZIDE) 37.5-25 MG per capsule, Take 1 capsule by mouth every morning, Disp: , Rfl:     potassium chloride (MICRO-K) 10 MEQ extended release capsule, Take 10 mEq by mouth 2 times daily, Disp: , Rfl:     calcium carbonate 600 MG TABS tablet, Take 1 tablet by mouth 2 times daily as needed, Disp: , Rfl:     Multiple Vitamins-Minerals (CENTRUM SILVER PO), Take by mouth, Disp: , Rfl:     aspirin 325 MG EC tablet, Take 1 tablet by mouth in the morning and 1 tablet before bedtime. Do all this for 28 days. , Disp: 56 tablet, Rfl: 0  Allergies   Allergen Reactions    Codeine Nausea And Vomiting     Social History     Socioeconomic History    Marital status: Single     Spouse name: Not on file    Number of children: Not on file    Years of education: Not on file    Highest education level: Not on file   Occupational History    Not on file   Tobacco Use    Smoking status: Never    Smokeless tobacco: Never   Substance and Sexual Activity    Alcohol use: Not Currently    Drug use: Never    Sexual activity: Not on file   Other Topics Concern    Not on file   Social History Narrative    Not on file     Social Determinants of Health     Financial Resource Strain: Not on file   Food Insecurity: Not on file   Transportation Needs: Not on file   Physical Activity: Not on file   Stress: Not on file   Social Connections: Not on file   Intimate Partner Violence: Not on file   Housing Stability: Not on file     No family history on file. Physical Exam:    Temp 98 °F (36.7 °C)   Ht 5' 3\" (1.6 m)   Wt 230 lb (104.3 kg)   BMI 40.74 kg/m²     GENERAL: alert, appears stated age, cooperative, no acute distress    HEENT: Head is normocephalic, atraumatic. PERRLA. SKIN: Clean, dry, intact. There is not any cellulitis or cutaneous lesions noted in the lower extremities     PULMONARY: breathing is regular and unlabored, no acute distress     CV: The bilateral lower extremities are warm and well-perfused with brisk capillary refill. 2+ pulses LE bilateral.     ABDOMINAL: Nontender, nondistended     PSYCHIATRY: Pleasant mood, appropriate behavior, follows commands     NEURO: Sensation is intact distally with light touch with no alteration.  Motor exam of the lower extremities show quadriceps, hamstrings, foot dorsiflexion and plantarflexion grossly intact 5/5. LYMPH: No lymphedema present distally in upper or lower extremity. MUSCULOSKELETAL:  Left ankle- nontender to palpation at the area of the fracture site. ROM   stiff but intact          The incision is healed well without evidence of infection. Pulses are intact and symmetric bilaterally         Strength 4/5      Imaging:  XR ANKLE LEFT (MIN 3 VIEWS)    Result Date: 10/26/2022  EXAMINATION: THREE XRAY VIEWS OF THE LEFT ANKLE 10/25/2022 10:57 am COMPARISON: None. HISTORY: ORDERING SYSTEM PROVIDED HISTORY: Closed bimalleolar fracture of left ankle with routine healing, subsequent encounter FINDINGS: Three views of the left ankle reveal hardware identified within the distal tibia and fibula. There is diffuse soft tissue swelling. Calcaneal spurring. Ankle mortise is intact. Osteopenia the bony structures. Hardware identified along the distal tibia and fibula with continued healing of the fractures. Satisfactory alignment. Diffuse soft tissue swelling. XR ANKLE LEFT (MIN 3 VIEWS)    Result Date: 11/30/2022  EXAMINATION: THREE XRAY VIEWS OF THE LEFT ANKLE 11/29/2022 10:40 am COMPARISON: Left ankle three views, 10/25/2022 HISTORY: ORDERING SYSTEM PROVIDED HISTORY: Closed bimalleolar fracture of left ankle with routine healing, subsequent encounter FINDINGS: The bones are osteopenic. There has been no significant interval change in the screw and plate device bridging distal fibula/lateral malleolus and a single screw bridges the medial malleolus. There is no evidence for perihardware fracture or loosening. The ankle mortise is appropriately aligned. There has been interval decreased soft tissue swelling of the ankle and hindfoot. Intact bilateral malleolar hardware fixation, as detailed above. Decreased soft tissue swelling.        XR ANKLE LEFT (MIN 3 VIEWS)    Result Date: 1/17/2023  EXAMINATION: THREE XRAY VIEWS OF THE LEFT ANKLE 1/17/2023 11:47 am COMPARISON: 11/29/2022 HISTORY: ORDERING SYSTEM PROVIDED HISTORY: Closed bimalleolar fracture of left ankle with routine healing, subsequent encounter FINDINGS: Three views of left ankle reveal cortical plate and screws seen aligning a by malleolar fracture with screw also seen in the medial malleolus stable and unchanged. There is diffuse soft tissue swelling. Osteopenia the bony structures. The ankle mortise is intact. Stable healed fracture seen of the medial and lateral malleolus with no evidence of acute bony abnormality. No hardware complication. XR ANKLE LEFT (MIN 3 VIEWS)    Result Date: 3/1/2023  EXAMINATION: THREE XRAY VIEWS OF THE LEFT ANKLE 2/28/2023 12:50 pm COMPARISON: 01/17/2023 HISTORY: ORDERING SYSTEM PROVIDED HISTORY: Closed bimalleolar fracture of left ankle with routine healing, subsequent encounter FINDINGS: Three views of the left ankle reveal hardware within the medial and lateral malleolus. The screw fusing the distal tibia and fibula is fractured. The fracture of the screw is new when compared to the prior study. No other hardware complication. The ankle mortise is intact. Healing fracture of the medial or lateral malleolus with fracture seen of the screw fusing the distal tibia and fibula. 1 mm separation of the hardware fragments. Minimal soft tissue swelling stable and unchanged. Malissa Cho was seen today for ankle pain. Diagnoses and all orders for this visit:    Closed bimalleolar fracture of left ankle with routine healing, subsequent encounter    Activity of daily living alteration    Ankle stiffness, left          Patient seen and examined. Imaging as well as patient's chart reviewed with patient in detail. Natural history and course discussed with patient in long discussion  Treatment options discussed with patient in detail including risks and benefits.   Patient should do well with continued conservative management. Patient will be postponing formal physical therapy until further into treatment of her blood clot. I determined that patient follow-up and 4 to 6 weeks and hopeful return to formal therapy at that time. She will continue with HEP still however. Additional time was taken today to discuss new finding and concern for broken syndesmotic screw as she was educated prior to surgery. Patient is not having pain in this region, most of the distal end and peroneal tendons. There is been no loosening or shifting of screw components with good fixation and multiple cortices. Patient should do well with continued conservative management of this since there is been no movement and no palpable tenderness in this region. Patient verbalized understanding. In a 15 minute assessment and discussion, patient was counseled on weight loss, healthy diet, and physical activity relating to this condition. She was educated with options in detail including nutrition, joining a health club/ weight loss program, and use of cardio equipment such as the Arc Trainer and the importance of use as well as range of motion and HEP exercises for weight loss and general health. Dhara Horan, DO              30 minutes was spent with patient. 50% or greater was spent counseling the patient.

## 2023-03-31 DIAGNOSIS — S82.842D CLOSED BIMALLEOLAR FRACTURE OF LEFT ANKLE WITH ROUTINE HEALING, SUBSEQUENT ENCOUNTER: Primary | ICD-10-CM

## 2023-04-05 ENCOUNTER — OFFICE VISIT (OUTPATIENT)
Dept: ORTHOPEDIC SURGERY | Age: 73
End: 2023-04-05

## 2023-04-05 VITALS — TEMPERATURE: 98 F | BODY MASS INDEX: 40.75 KG/M2 | HEIGHT: 63 IN | WEIGHT: 230 LBS

## 2023-04-05 DIAGNOSIS — Z71.82 EXERCISE COUNSELING: ICD-10-CM

## 2023-04-05 DIAGNOSIS — S82.842D CLOSED BIMALLEOLAR FRACTURE OF LEFT ANKLE WITH ROUTINE HEALING, SUBSEQUENT ENCOUNTER: Primary | ICD-10-CM

## 2023-04-05 DIAGNOSIS — S93.492A SPRAIN OF ANTERIOR TALOFIBULAR LIGAMENT OF LEFT ANKLE, INITIAL ENCOUNTER: ICD-10-CM

## 2023-04-05 NOTE — PROGRESS NOTES
times daily as needed, Disp: , Rfl:     Multiple Vitamins-Minerals (CENTRUM SILVER PO), Take by mouth, Disp: , Rfl:     aspirin 325 MG EC tablet, Take 1 tablet by mouth in the morning and 1 tablet before bedtime. Do all this for 28 days. , Disp: 56 tablet, Rfl: 0  Allergies   Allergen Reactions    Codeine Nausea And Vomiting     Social History     Socioeconomic History    Marital status: Single     Spouse name: Not on file    Number of children: Not on file    Years of education: Not on file    Highest education level: Not on file   Occupational History    Not on file   Tobacco Use    Smoking status: Never    Smokeless tobacco: Never   Substance and Sexual Activity    Alcohol use: Not Currently    Drug use: Never    Sexual activity: Not on file   Other Topics Concern    Not on file   Social History Narrative    Not on file     Social Determinants of Health     Financial Resource Strain: Not on file   Food Insecurity: Not on file   Transportation Needs: Not on file   Physical Activity: Not on file   Stress: Not on file   Social Connections: Not on file   Intimate Partner Violence: Not on file   Housing Stability: Not on file     No family history on file. Physical Exam:    Temp 98 °F (36.7 °C)   Ht 5' 3\" (1.6 m)   Wt 230 lb (104.3 kg)   BMI 40.74 kg/m²     GENERAL: alert, appears stated age, cooperative, no acute distress    HEENT: Head is normocephalic, atraumatic. PERRLA. SKIN: Clean, dry, intact. There is not any cellulitis or cutaneous lesions noted in the lower extremities     PULMONARY: breathing is regular and unlabored, no acute distress     CV: The bilateral lower extremities are warm and well-perfused with brisk capillary refill. 2+ pulses LE bilateral.     ABDOMINAL: Nontender, nondistended     PSYCHIATRY: Pleasant mood, appropriate behavior, follows commands     NEURO: Sensation is intact distally with light touch with no alteration.  Motor exam of the lower extremities show quadriceps,

## 2023-05-30 DIAGNOSIS — S82.842D CLOSED BIMALLEOLAR FRACTURE OF LEFT ANKLE WITH ROUTINE HEALING, SUBSEQUENT ENCOUNTER: Primary | ICD-10-CM

## 2023-05-31 ENCOUNTER — OFFICE VISIT (OUTPATIENT)
Dept: ORTHOPEDIC SURGERY | Age: 73
End: 2023-05-31
Payer: MEDICARE

## 2023-05-31 VITALS — HEIGHT: 63 IN | BODY MASS INDEX: 40.75 KG/M2 | WEIGHT: 230 LBS | TEMPERATURE: 98 F

## 2023-05-31 DIAGNOSIS — S93.492A SPRAIN OF ANTERIOR TALOFIBULAR LIGAMENT OF LEFT ANKLE, INITIAL ENCOUNTER: ICD-10-CM

## 2023-05-31 DIAGNOSIS — Z78.9 ACTIVITY OF DAILY LIVING ALTERATION: ICD-10-CM

## 2023-05-31 DIAGNOSIS — M25.672 ANKLE STIFFNESS, LEFT: ICD-10-CM

## 2023-05-31 DIAGNOSIS — S82.842D CLOSED BIMALLEOLAR FRACTURE OF LEFT ANKLE WITH ROUTINE HEALING, SUBSEQUENT ENCOUNTER: Primary | ICD-10-CM

## 2023-05-31 DIAGNOSIS — Z71.82 EXERCISE COUNSELING: ICD-10-CM

## 2023-05-31 PROCEDURE — 1090F PRES/ABSN URINE INCON ASSESS: CPT | Performed by: ORTHOPAEDIC SURGERY

## 2023-05-31 PROCEDURE — 3017F COLORECTAL CA SCREEN DOC REV: CPT | Performed by: ORTHOPAEDIC SURGERY

## 2023-05-31 PROCEDURE — 99214 OFFICE O/P EST MOD 30 MIN: CPT | Performed by: ORTHOPAEDIC SURGERY

## 2023-05-31 PROCEDURE — G8427 DOCREV CUR MEDS BY ELIG CLIN: HCPCS | Performed by: ORTHOPAEDIC SURGERY

## 2023-05-31 PROCEDURE — 1123F ACP DISCUSS/DSCN MKR DOCD: CPT | Performed by: ORTHOPAEDIC SURGERY

## 2023-05-31 PROCEDURE — 1036F TOBACCO NON-USER: CPT | Performed by: ORTHOPAEDIC SURGERY

## 2023-05-31 PROCEDURE — G8399 PT W/DXA RESULTS DOCUMENT: HCPCS | Performed by: ORTHOPAEDIC SURGERY

## 2023-05-31 PROCEDURE — G8417 CALC BMI ABV UP PARAM F/U: HCPCS | Performed by: ORTHOPAEDIC SURGERY

## 2023-05-31 NOTE — PROGRESS NOTES
Chief Complaint   Patient presents with    Ankle Pain     Left Ankle ORIF DOS 07/25/2022           HPI:    Patient is 68 y.o. female here today for follow-up after left ankle ORIF. Date of surgery was 7/25/2022. Patient is somewhat hesitant to put weight on since last visit. She has been doing much better in regards to this. .  She is currently in treatment for this from her primary care physician. Otherwise doing well with no new complaints. ROS:    Skin: (-) rash,(-) psoriasis,(-) eczema, (-)skin cancer. Neurologic: (-)numbness, (-)tingling, (-)headaches, (-) LOC. Cardiovascular: (-) Chest pain, (-) swelling in legs/feet, (-) SOB, (-) cramping in legs/feet with walking.     All other review of systems negative except stated above or in HPI      Past Medical History:   Diagnosis Date    Hypertension     PONV (postoperative nausea and vomiting)      Past Surgical History:   Procedure Laterality Date    ANKLE FRACTURE SURGERY Left 7/20/2022    LEFT ANKLE EXTERNAL FIXATOR performed by Ash Hoffman DO at [facility] Left 7/25/2022    LEFT ANKLE OPEN REDUCTION INTERNAL FIXATION **DO NOT CHANGE TIME** performed by Ash Hoffman DO at [facility] (CERVIX STATUS UNKNOWN)         Current Outpatient Medications:     aspirin 325 MG tablet, Take 1 tablet by mouth daily, Disp: , Rfl:     Nirmatrelvir-Ritonavir (PAXLOVID PO), Take by mouth, Disp: , Rfl:     senna (SENOKOT) 8.6 MG tablet, Take 1 tablet by mouth daily, Disp: , Rfl:     amLODIPine (NORVASC) 10 MG tablet, Take 1 tablet by mouth daily, Disp: , Rfl:     triamterene-hydroCHLOROthiazide (DYAZIDE) 37.5-25 MG per capsule, Take 1 capsule by mouth every morning, Disp: , Rfl:     potassium chloride (MICRO-K) 10 MEQ extended release capsule, Take 1 capsule by mouth 2 times daily, Disp: , Rfl:     calcium carbonate 600 MG TABS tablet, Take 1 tablet by mouth 2 times daily as needed, Disp: , Rfl:     Multiple Vitamins-Minerals (CENTRUM

## 2023-07-27 ENCOUNTER — OFFICE VISIT (OUTPATIENT)
Dept: ORTHOPEDIC SURGERY | Age: 73
End: 2023-07-27

## 2023-07-27 VITALS — HEIGHT: 63 IN | TEMPERATURE: 98 F | WEIGHT: 229 LBS | BODY MASS INDEX: 40.57 KG/M2

## 2023-07-27 DIAGNOSIS — Z78.9 ACTIVITY OF DAILY LIVING ALTERATION: ICD-10-CM

## 2023-07-27 DIAGNOSIS — S93.492A SPRAIN OF ANTERIOR TALOFIBULAR LIGAMENT OF LEFT ANKLE, INITIAL ENCOUNTER: Primary | ICD-10-CM

## 2023-07-27 DIAGNOSIS — S82.842D CLOSED BIMALLEOLAR FRACTURE OF LEFT ANKLE WITH ROUTINE HEALING, SUBSEQUENT ENCOUNTER: ICD-10-CM

## 2023-07-27 DIAGNOSIS — Z71.82 EXERCISE COUNSELING: ICD-10-CM

## 2023-07-27 DIAGNOSIS — M25.672 ANKLE STIFFNESS, LEFT: ICD-10-CM

## 2023-07-27 NOTE — PROGRESS NOTES
Chief Complaint   Patient presents with    Ankle Pain     Left ankle ORIF f/u not bothering her using a copper fit sleeve. HPI:    Patient is 68 y.o. female here today for follow-up after left ankle ORIF. Date of surgery was 7/25/2022. Patient is somewhat hesitant to put weight on since last visit. She has been doing much better in regards to this. .  She is currently in treatment for this from her primary care physician. Otherwise doing well with no new complaints. ROS:    Skin: (-) rash,(-) psoriasis,(-) eczema, (-)skin cancer. Neurologic: (-)numbness, (-)tingling, (-)headaches, (-) LOC. Cardiovascular: (-) Chest pain, (-) swelling in legs/feet, (-) SOB, (-) cramping in legs/feet with walking.     All other review of systems negative except stated above or in HPI      Past Medical History:   Diagnosis Date    Hypertension     PONV (postoperative nausea and vomiting)      Past Surgical History:   Procedure Laterality Date    ANKLE FRACTURE SURGERY Left 7/20/2022    LEFT ANKLE EXTERNAL FIXATOR performed by Raj Glasgow DO at [address] Left 7/25/2022    LEFT ANKLE OPEN REDUCTION INTERNAL FIXATION **DO NOT CHANGE TIME** performed by Raj Glasgow DO at [address] (CERVIX STATUS UNKNOWN)         Current Outpatient Medications:     aspirin 325 MG tablet, Take 1 tablet by mouth daily, Disp: , Rfl:     Nirmatrelvir-Ritonavir (PAXLOVID PO), Take by mouth, Disp: , Rfl:     senna (SENOKOT) 8.6 MG tablet, Take 1 tablet by mouth daily, Disp: , Rfl:     amLODIPine (NORVASC) 10 MG tablet, Take 1 tablet by mouth daily, Disp: , Rfl:     triamterene-hydroCHLOROthiazide (DYAZIDE) 37.5-25 MG per capsule, Take 1 capsule by mouth every morning, Disp: , Rfl:     potassium chloride (MICRO-K) 10 MEQ extended release capsule, Take 1 capsule by mouth 2 times daily, Disp: , Rfl:     calcium carbonate 600 MG TABS tablet, Take 1 tablet by mouth 2 times daily as needed, Disp: , Rfl:

## 2023-08-26 NOTE — PROGRESS NOTES
as the [employer] and the importance of use as well as range of motion and HEP exercises for weight loss and general health. Patient is not having pain in this region, most of the distal end and peroneal tendons. There is been no loosening or shifting of screw components with good fixation and multiple cortices. Patient should do well with continued conservative management of this since there is been no movement and no palpable tenderness in this region. Patient verbalized understanding. Raj Glasgow, DO              30 minutes providing this service today, to include the time spent seeing the patient, documenting, and reviewing chart excluding any separately billed procedures.

## 2023-08-30 DIAGNOSIS — M81.0 AGE-RELATED OSTEOPOROSIS WITHOUT CURRENT PATHOLOGICAL FRACTURE: Primary | ICD-10-CM

## 2023-09-07 ENCOUNTER — OFFICE VISIT (OUTPATIENT)
Dept: ORTHOPEDIC SURGERY | Age: 73
End: 2023-09-07
Payer: COMMERCIAL

## 2023-09-07 VITALS — WEIGHT: 229 LBS | HEIGHT: 63 IN | TEMPERATURE: 98 F | BODY MASS INDEX: 40.57 KG/M2

## 2023-09-07 DIAGNOSIS — Z71.82 EXERCISE COUNSELING: ICD-10-CM

## 2023-09-07 DIAGNOSIS — S93.492A SPRAIN OF ANTERIOR TALOFIBULAR LIGAMENT OF LEFT ANKLE, INITIAL ENCOUNTER: Primary | ICD-10-CM

## 2023-09-07 DIAGNOSIS — S82.842D CLOSED BIMALLEOLAR FRACTURE OF LEFT ANKLE WITH ROUTINE HEALING, SUBSEQUENT ENCOUNTER: ICD-10-CM

## 2023-09-07 DIAGNOSIS — Z78.9 ACTIVITY OF DAILY LIVING ALTERATION: ICD-10-CM

## 2023-09-07 DIAGNOSIS — M25.672 ANKLE STIFFNESS, LEFT: ICD-10-CM

## 2023-09-07 PROCEDURE — 1123F ACP DISCUSS/DSCN MKR DOCD: CPT | Performed by: ORTHOPAEDIC SURGERY

## 2023-09-07 PROCEDURE — 99214 OFFICE O/P EST MOD 30 MIN: CPT | Performed by: ORTHOPAEDIC SURGERY

## 2023-09-11 ENCOUNTER — HOSPITAL ENCOUNTER (OUTPATIENT)
Dept: INFUSION THERAPY | Age: 73
Setting detail: INFUSION SERIES
Discharge: HOME | End: 2023-09-11
Payer: MEDICARE

## 2023-09-11 VITALS
HEART RATE: 75 BPM | OXYGEN SATURATION: 98 % | RESPIRATION RATE: 18 BRPM | DIASTOLIC BLOOD PRESSURE: 68 MMHG | TEMPERATURE: 98.2 F | SYSTOLIC BLOOD PRESSURE: 152 MMHG

## 2023-09-11 DIAGNOSIS — M81.0 AGE-RELATED OSTEOPOROSIS WITHOUT CURRENT PATHOLOGICAL FRACTURE: Primary | ICD-10-CM

## 2023-09-11 PROCEDURE — 96372 THER/PROPH/DIAG INJ SC/IM: CPT

## 2023-09-11 PROCEDURE — 6360000002 HC RX W HCPCS: Performed by: INTERNAL MEDICINE

## 2023-09-11 RX ADMIN — DENOSUMAB 60 MG: 60 INJECTION SUBCUTANEOUS at 10:10

## 2023-11-03 DIAGNOSIS — S93.492A SPRAIN OF ANTERIOR TALOFIBULAR LIGAMENT OF LEFT ANKLE, INITIAL ENCOUNTER: Primary | ICD-10-CM

## 2023-11-08 ENCOUNTER — OFFICE VISIT (OUTPATIENT)
Dept: ORTHOPEDIC SURGERY | Age: 73
End: 2023-11-08
Payer: MEDICARE

## 2023-11-08 VITALS — BODY MASS INDEX: 40.57 KG/M2 | TEMPERATURE: 98 F | WEIGHT: 229 LBS | HEIGHT: 63 IN

## 2023-11-08 DIAGNOSIS — Z78.9 ACTIVITY OF DAILY LIVING ALTERATION: ICD-10-CM

## 2023-11-08 DIAGNOSIS — S93.492A SPRAIN OF ANTERIOR TALOFIBULAR LIGAMENT OF LEFT ANKLE, INITIAL ENCOUNTER: Primary | ICD-10-CM

## 2023-11-08 DIAGNOSIS — Z71.82 EXERCISE COUNSELING: ICD-10-CM

## 2023-11-08 DIAGNOSIS — M25.672 ANKLE STIFFNESS, LEFT: ICD-10-CM

## 2023-11-08 DIAGNOSIS — S82.842D CLOSED BIMALLEOLAR FRACTURE OF LEFT ANKLE WITH ROUTINE HEALING, SUBSEQUENT ENCOUNTER: ICD-10-CM

## 2023-11-08 PROCEDURE — 99214 OFFICE O/P EST MOD 30 MIN: CPT | Performed by: ORTHOPAEDIC SURGERY

## 2023-11-08 PROCEDURE — G8484 FLU IMMUNIZE NO ADMIN: HCPCS | Performed by: ORTHOPAEDIC SURGERY

## 2023-11-08 PROCEDURE — 1036F TOBACCO NON-USER: CPT | Performed by: ORTHOPAEDIC SURGERY

## 2023-11-08 PROCEDURE — G8417 CALC BMI ABV UP PARAM F/U: HCPCS | Performed by: ORTHOPAEDIC SURGERY

## 2023-11-08 PROCEDURE — G8399 PT W/DXA RESULTS DOCUMENT: HCPCS | Performed by: ORTHOPAEDIC SURGERY

## 2023-11-08 PROCEDURE — 3017F COLORECTAL CA SCREEN DOC REV: CPT | Performed by: ORTHOPAEDIC SURGERY

## 2023-11-08 PROCEDURE — 1090F PRES/ABSN URINE INCON ASSESS: CPT | Performed by: ORTHOPAEDIC SURGERY

## 2023-11-08 PROCEDURE — 1123F ACP DISCUSS/DSCN MKR DOCD: CPT | Performed by: ORTHOPAEDIC SURGERY

## 2023-11-08 PROCEDURE — G8427 DOCREV CUR MEDS BY ELIG CLIN: HCPCS | Performed by: ORTHOPAEDIC SURGERY

## 2023-11-08 NOTE — PROGRESS NOTES
pain.    Diagnoses and all orders for this visit:    Sprain of anterior talofibular ligament of left ankle, initial encounter    Closed bimalleolar fracture of left ankle with routine healing, subsequent encounter    Exercise counseling    Activity of daily living alteration    Ankle stiffness, left              Patient seen and examined. Imaging reviewed with patient in detail. Patient's chart also reviewed in detail with additional time taken today. Natural history and course discussed with patient in long discussion. Treatment options discussed with patient in detail including risks and benefits. Patient should do well with conservative management as patient would like to avoid surgery at this time. Patient follow-up in 4 to 6 weeks. She will continue with HEP still however. Additional time was taken today to discuss new finding and concern for broken syndesmotic screw as she was educated prior to surgery. Patient was counseled on weight loss, healthy diet, and physical activity relating to this condition. She was educated with options in detail including nutrition, joining a health club/ weight loss program, and use of cardio equipment such as the Arc Trainer and the importance of use as well as range of motion and HEP exercises for weight loss and general health. Patient is not having pain in this region, most of the distal end and peroneal tendons. There is been no loosening or shifting of screw components with good fixation and multiple cortices. Patient should do well with continued conservative management of this since there is been no movement and no palpable tenderness in this region. Patient verbalized understanding. Rajendra Sotelo, DO              30 minutes providing this service today, to include the time spent seeing the patient, documenting, and reviewing chart excluding any separately billed procedures.

## 2024-01-02 ENCOUNTER — HOSPITAL ENCOUNTER (INPATIENT)
Age: 74
LOS: 6 days | Discharge: HOME | DRG: 871 | End: 2024-01-08
Attending: STUDENT IN AN ORGANIZED HEALTH CARE EDUCATION/TRAINING PROGRAM | Admitting: INTERNAL MEDICINE
Payer: MEDICARE

## 2024-01-02 ENCOUNTER — APPOINTMENT (OUTPATIENT)
Dept: CT IMAGING | Age: 74
DRG: 871 | End: 2024-01-02
Payer: MEDICARE

## 2024-01-02 ENCOUNTER — APPOINTMENT (OUTPATIENT)
Dept: GENERAL RADIOLOGY | Age: 74
DRG: 871 | End: 2024-01-02
Payer: MEDICARE

## 2024-01-02 DIAGNOSIS — J96.01 ACUTE RESPIRATORY FAILURE WITH HYPOXIA (HCC): Primary | ICD-10-CM

## 2024-01-02 DIAGNOSIS — J18.9 PNEUMONIA OF LEFT LOWER LOBE DUE TO INFECTIOUS ORGANISM: ICD-10-CM

## 2024-01-02 PROBLEM — J96.00 ACUTE RESPIRATORY FAILURE (HCC): Status: ACTIVE | Noted: 2024-01-02

## 2024-01-02 LAB
ALBUMIN SERPL-MCNC: 4.1 G/DL (ref 3.5–5.2)
ALP SERPL-CCNC: 98 U/L (ref 35–104)
ALT SERPL-CCNC: 58 U/L (ref 0–32)
ANION GAP SERPL CALCULATED.3IONS-SCNC: 14 MMOL/L (ref 7–16)
AST SERPL-CCNC: 89 U/L (ref 0–31)
BASOPHILS # BLD: 0 K/UL (ref 0–0.2)
BASOPHILS NFR BLD: 0 % (ref 0–2)
BILIRUB SERPL-MCNC: 1.1 MG/DL (ref 0–1.2)
BNP SERPL-MCNC: 226 PG/ML (ref 0–450)
BUN SERPL-MCNC: 17 MG/DL (ref 6–23)
CALCIUM SERPL-MCNC: 8.6 MG/DL (ref 8.6–10.2)
CHLORIDE SERPL-SCNC: 101 MMOL/L (ref 98–107)
CO2 SERPL-SCNC: 24 MMOL/L (ref 22–29)
CREAT SERPL-MCNC: 1.1 MG/DL (ref 0.5–1)
EKG ATRIAL RATE: 107 BPM
EKG P AXIS: 65 DEGREES
EKG P-R INTERVAL: 170 MS
EKG Q-T INTERVAL: 338 MS
EKG QRS DURATION: 96 MS
EKG QTC CALCULATION (BAZETT): 451 MS
EKG R AXIS: 24 DEGREES
EKG T AXIS: 46 DEGREES
EKG VENTRICULAR RATE: 107 BPM
EOSINOPHIL # BLD: 0 K/UL (ref 0.05–0.5)
EOSINOPHILS RELATIVE PERCENT: 0 % (ref 0–6)
ERYTHROCYTE [DISTWIDTH] IN BLOOD BY AUTOMATED COUNT: 13.2 % (ref 11.5–15)
GFR SERPL CREATININE-BSD FRML MDRD: 56 ML/MIN/1.73M2
GLUCOSE SERPL-MCNC: 155 MG/DL (ref 74–99)
HCT VFR BLD AUTO: 37.8 % (ref 34–48)
HGB BLD-MCNC: 12.6 G/DL (ref 11.5–15.5)
INFLUENZA A BY PCR: NOT DETECTED
INFLUENZA B BY PCR: NOT DETECTED
LACTATE BLDV-SCNC: 1.5 MMOL/L (ref 0.5–1.9)
LACTATE BLDV-SCNC: 2.3 MMOL/L (ref 0.5–1.9)
LYMPHOCYTES NFR BLD: 0 K/UL (ref 1.5–4)
LYMPHOCYTES RELATIVE PERCENT: 0 % (ref 20–42)
MCH RBC QN AUTO: 31.7 PG (ref 26–35)
MCHC RBC AUTO-ENTMCNC: 33.3 G/DL (ref 32–34.5)
MCV RBC AUTO: 95 FL (ref 80–99.9)
MONOCYTES NFR BLD: 0.97 K/UL (ref 0.1–0.95)
MONOCYTES NFR BLD: 4 % (ref 2–12)
NEUTROPHILS NFR BLD: 96 % (ref 43–80)
NEUTS SEG NFR BLD: 21.33 K/UL (ref 1.8–7.3)
PH VENOUS: 7.34 (ref 7.35–7.45)
PLATELET # BLD AUTO: 226 K/UL (ref 130–450)
PMV BLD AUTO: 11.3 FL (ref 7–12)
POTASSIUM SERPL-SCNC: 3.4 MMOL/L (ref 3.5–5)
PROT SERPL-MCNC: 8 G/DL (ref 6.4–8.3)
RBC # BLD AUTO: 3.98 M/UL (ref 3.5–5.5)
RBC # BLD: ABNORMAL 10*6/UL
SARS-COV-2 RDRP RESP QL NAA+PROBE: NOT DETECTED
SODIUM SERPL-SCNC: 139 MMOL/L (ref 132–146)
SPECIMEN DESCRIPTION: NORMAL
TROPONIN I SERPL HS-MCNC: 21 NG/L (ref 0–9)
TROPONIN I SERPL HS-MCNC: 22 NG/L (ref 0–9)
WBC OTHER # BLD: 22.3 K/UL (ref 4.5–11.5)

## 2024-01-02 PROCEDURE — 87502 INFLUENZA DNA AMP PROBE: CPT

## 2024-01-02 PROCEDURE — 2060000000 HC ICU INTERMEDIATE R&B

## 2024-01-02 PROCEDURE — 96361 HYDRATE IV INFUSION ADD-ON: CPT

## 2024-01-02 PROCEDURE — 71275 CT ANGIOGRAPHY CHEST: CPT

## 2024-01-02 PROCEDURE — 2500000003 HC RX 250 WO HCPCS: Performed by: STUDENT IN AN ORGANIZED HEALTH CARE EDUCATION/TRAINING PROGRAM

## 2024-01-02 PROCEDURE — 6370000000 HC RX 637 (ALT 250 FOR IP): Performed by: INTERNAL MEDICINE

## 2024-01-02 PROCEDURE — 80053 COMPREHEN METABOLIC PANEL: CPT

## 2024-01-02 PROCEDURE — 85025 COMPLETE CBC W/AUTO DIFF WBC: CPT

## 2024-01-02 PROCEDURE — 84484 ASSAY OF TROPONIN QUANT: CPT

## 2024-01-02 PROCEDURE — 82800 BLOOD PH: CPT

## 2024-01-02 PROCEDURE — 6360000002 HC RX W HCPCS: Performed by: STUDENT IN AN ORGANIZED HEALTH CARE EDUCATION/TRAINING PROGRAM

## 2024-01-02 PROCEDURE — 6360000004 HC RX CONTRAST MEDICATION: Performed by: RADIOLOGY

## 2024-01-02 PROCEDURE — 96366 THER/PROPH/DIAG IV INF ADDON: CPT

## 2024-01-02 PROCEDURE — 83605 ASSAY OF LACTIC ACID: CPT

## 2024-01-02 PROCEDURE — 2580000003 HC RX 258: Performed by: INTERNAL MEDICINE

## 2024-01-02 PROCEDURE — 99285 EMERGENCY DEPT VISIT HI MDM: CPT

## 2024-01-02 PROCEDURE — 83880 ASSAY OF NATRIURETIC PEPTIDE: CPT

## 2024-01-02 PROCEDURE — 93005 ELECTROCARDIOGRAM TRACING: CPT | Performed by: STUDENT IN AN ORGANIZED HEALTH CARE EDUCATION/TRAINING PROGRAM

## 2024-01-02 PROCEDURE — 96375 TX/PRO/DX INJ NEW DRUG ADDON: CPT

## 2024-01-02 PROCEDURE — 6360000002 HC RX W HCPCS: Performed by: INTERNAL MEDICINE

## 2024-01-02 PROCEDURE — 87040 BLOOD CULTURE FOR BACTERIA: CPT

## 2024-01-02 PROCEDURE — 6370000000 HC RX 637 (ALT 250 FOR IP): Performed by: STUDENT IN AN ORGANIZED HEALTH CARE EDUCATION/TRAINING PROGRAM

## 2024-01-02 PROCEDURE — 87635 SARS-COV-2 COVID-19 AMP PRB: CPT

## 2024-01-02 PROCEDURE — 96365 THER/PROPH/DIAG IV INF INIT: CPT

## 2024-01-02 PROCEDURE — 93010 ELECTROCARDIOGRAM REPORT: CPT | Performed by: INTERNAL MEDICINE

## 2024-01-02 PROCEDURE — 94640 AIRWAY INHALATION TREATMENT: CPT

## 2024-01-02 PROCEDURE — 2580000003 HC RX 258: Performed by: STUDENT IN AN ORGANIZED HEALTH CARE EDUCATION/TRAINING PROGRAM

## 2024-01-02 PROCEDURE — 71045 X-RAY EXAM CHEST 1 VIEW: CPT

## 2024-01-02 RX ORDER — ACETAMINOPHEN 650 MG/1
650 SUPPOSITORY RECTAL EVERY 6 HOURS PRN
Status: DISCONTINUED | OUTPATIENT
Start: 2024-01-02 | End: 2024-01-08 | Stop reason: HOSPADM

## 2024-01-02 RX ORDER — 0.9 % SODIUM CHLORIDE 0.9 %
500 INTRAVENOUS SOLUTION INTRAVENOUS ONCE
Status: COMPLETED | OUTPATIENT
Start: 2024-01-02 | End: 2024-01-02

## 2024-01-02 RX ORDER — IPRATROPIUM BROMIDE AND ALBUTEROL SULFATE 2.5; .5 MG/3ML; MG/3ML
1 SOLUTION RESPIRATORY (INHALATION) ONCE
Status: COMPLETED | OUTPATIENT
Start: 2024-01-02 | End: 2024-01-02

## 2024-01-02 RX ORDER — ALBUTEROL SULFATE 2.5 MG/3ML
2.5 SOLUTION RESPIRATORY (INHALATION)
Status: DISCONTINUED | OUTPATIENT
Start: 2024-01-02 | End: 2024-01-03

## 2024-01-02 RX ORDER — SODIUM CHLORIDE 9 MG/ML
INJECTION, SOLUTION INTRAVENOUS
Status: DISPENSED
Start: 2024-01-02 | End: 2024-01-02

## 2024-01-02 RX ORDER — SODIUM CHLORIDE 0.9 % (FLUSH) 0.9 %
5-40 SYRINGE (ML) INJECTION PRN
Status: DISCONTINUED | OUTPATIENT
Start: 2024-01-02 | End: 2024-01-08 | Stop reason: HOSPADM

## 2024-01-02 RX ORDER — ONDANSETRON 2 MG/ML
4 INJECTION INTRAMUSCULAR; INTRAVENOUS ONCE
Status: COMPLETED | OUTPATIENT
Start: 2024-01-02 | End: 2024-01-02

## 2024-01-02 RX ORDER — ONDANSETRON 4 MG/1
4 TABLET, ORALLY DISINTEGRATING ORAL EVERY 8 HOURS PRN
Status: DISCONTINUED | OUTPATIENT
Start: 2024-01-02 | End: 2024-01-08 | Stop reason: HOSPADM

## 2024-01-02 RX ORDER — SODIUM CHLORIDE 9 MG/ML
INJECTION, SOLUTION INTRAVENOUS PRN
Status: DISCONTINUED | OUTPATIENT
Start: 2024-01-02 | End: 2024-01-08 | Stop reason: HOSPADM

## 2024-01-02 RX ORDER — POLYETHYLENE GLYCOL 3350 17 G/17G
17 POWDER, FOR SOLUTION ORAL DAILY PRN
Status: DISCONTINUED | OUTPATIENT
Start: 2024-01-02 | End: 2024-01-08 | Stop reason: HOSPADM

## 2024-01-02 RX ORDER — IPRATROPIUM BROMIDE AND ALBUTEROL SULFATE 2.5; .5 MG/3ML; MG/3ML
3 SOLUTION RESPIRATORY (INHALATION) ONCE
Status: COMPLETED | OUTPATIENT
Start: 2024-01-02 | End: 2024-01-02

## 2024-01-02 RX ORDER — ONDANSETRON 2 MG/ML
4 INJECTION INTRAMUSCULAR; INTRAVENOUS EVERY 6 HOURS PRN
Status: DISCONTINUED | OUTPATIENT
Start: 2024-01-02 | End: 2024-01-08 | Stop reason: HOSPADM

## 2024-01-02 RX ORDER — ACETAMINOPHEN 325 MG/1
650 TABLET ORAL EVERY 6 HOURS PRN
Status: DISCONTINUED | OUTPATIENT
Start: 2024-01-02 | End: 2024-01-08 | Stop reason: HOSPADM

## 2024-01-02 RX ORDER — AZITHROMYCIN 250 MG/1
500 TABLET, FILM COATED ORAL EVERY 24 HOURS
Status: DISCONTINUED | OUTPATIENT
Start: 2024-01-02 | End: 2024-01-03

## 2024-01-02 RX ORDER — GUAIFENESIN/DEXTROMETHORPHAN 100-10MG/5
5 SYRUP ORAL EVERY 4 HOURS PRN
Status: DISCONTINUED | OUTPATIENT
Start: 2024-01-02 | End: 2024-01-08 | Stop reason: HOSPADM

## 2024-01-02 RX ORDER — ENOXAPARIN SODIUM 100 MG/ML
40 INJECTION SUBCUTANEOUS DAILY
Status: DISCONTINUED | OUTPATIENT
Start: 2024-01-02 | End: 2024-01-02

## 2024-01-02 RX ORDER — CHLORAL HYDRATE 500 MG
1000 CAPSULE ORAL DAILY
COMMUNITY

## 2024-01-02 RX ORDER — SODIUM CHLORIDE 0.9 % (FLUSH) 0.9 %
5-40 SYRINGE (ML) INJECTION EVERY 12 HOURS SCHEDULED
Status: DISCONTINUED | OUTPATIENT
Start: 2024-01-02 | End: 2024-01-08 | Stop reason: HOSPADM

## 2024-01-02 RX ORDER — ENOXAPARIN SODIUM 100 MG/ML
30 INJECTION SUBCUTANEOUS 2 TIMES DAILY
Status: DISCONTINUED | OUTPATIENT
Start: 2024-01-02 | End: 2024-01-08 | Stop reason: HOSPADM

## 2024-01-02 RX ADMIN — AZITHROMYCIN DIHYDRATE 500 MG: 250 TABLET ORAL at 09:13

## 2024-01-02 RX ADMIN — DOXYCYCLINE 100 MG: 100 INJECTION, POWDER, LYOPHILIZED, FOR SOLUTION INTRAVENOUS at 03:11

## 2024-01-02 RX ADMIN — ENOXAPARIN SODIUM 30 MG: 100 INJECTION SUBCUTANEOUS at 14:08

## 2024-01-02 RX ADMIN — ALBUTEROL SULFATE 2.5 MG: 2.5 SOLUTION RESPIRATORY (INHALATION) at 12:57

## 2024-01-02 RX ADMIN — Medication 10 ML: at 09:05

## 2024-01-02 RX ADMIN — Medication 10 ML: at 21:26

## 2024-01-02 RX ADMIN — IPRATROPIUM BROMIDE AND ALBUTEROL SULFATE 1 DOSE: .5; 2.5 SOLUTION RESPIRATORY (INHALATION) at 04:13

## 2024-01-02 RX ADMIN — ALBUTEROL SULFATE 2.5 MG: 2.5 SOLUTION RESPIRATORY (INHALATION) at 17:12

## 2024-01-02 RX ADMIN — GUAIFENESIN AND DEXTROMETHORPHAN 5 ML: 100; 10 SYRUP ORAL at 23:57

## 2024-01-02 RX ADMIN — ALBUTEROL SULFATE 2.5 MG: 2.5 SOLUTION RESPIRATORY (INHALATION) at 08:52

## 2024-01-02 RX ADMIN — IPRATROPIUM BROMIDE AND ALBUTEROL SULFATE 3 DOSE: .5; 2.5 SOLUTION RESPIRATORY (INHALATION) at 01:43

## 2024-01-02 RX ADMIN — CEFTRIAXONE SODIUM 2000 MG: 2 INJECTION, POWDER, FOR SOLUTION INTRAMUSCULAR; INTRAVENOUS at 02:41

## 2024-01-02 RX ADMIN — ENOXAPARIN SODIUM 30 MG: 100 INJECTION SUBCUTANEOUS at 21:22

## 2024-01-02 RX ADMIN — SODIUM CHLORIDE 500 ML: 9 INJECTION, SOLUTION INTRAVENOUS at 02:39

## 2024-01-02 RX ADMIN — IOPAMIDOL 80 ML: 755 INJECTION, SOLUTION INTRAVENOUS at 03:27

## 2024-01-02 RX ADMIN — ONDANSETRON 4 MG: 2 INJECTION INTRAMUSCULAR; INTRAVENOUS at 09:14

## 2024-01-02 RX ADMIN — ONDANSETRON 4 MG: 2 INJECTION INTRAMUSCULAR; INTRAVENOUS at 02:40

## 2024-01-02 RX ADMIN — WATER 40 MG: 1 INJECTION INTRAMUSCULAR; INTRAVENOUS; SUBCUTANEOUS at 09:14

## 2024-01-02 ASSESSMENT — LIFESTYLE VARIABLES
HOW MANY STANDARD DRINKS CONTAINING ALCOHOL DO YOU HAVE ON A TYPICAL DAY: PATIENT DOES NOT DRINK
HOW OFTEN DO YOU HAVE A DRINK CONTAINING ALCOHOL: NEVER

## 2024-01-02 ASSESSMENT — PAIN SCALES - GENERAL: PAINLEVEL_OUTOF10: 0

## 2024-01-02 NOTE — ED TRIAGE NOTES
Department of Emergency Medicine  FIRST PROVIDER TRIAGE NOTE             Independent MLP           1/2/24  12:59 AM EST    Date of Encounter: 1/2/24   MRN: [id number]      HPI: Marisela Guillaume is a 73 y.o. female who presents to the ED for Shortness of Breath (Short of breath for 3-4 days, increasingly worse, was 85% on RA, was placed on oxygen at window. SPO2  went up to 97% after 4L of oxygen via NC, does not wear oxygen at home. )     ROS: Negative for abd pain.    PE: Gen Appearance/Constitutional: alert  CV: tachycardia  Pulm: abnormal breath sounds auscultated     Initial Plan of Care: All treatment areas with department are currently occupied. Plan to order/Initiate the following while awaiting opening in ED: EKG.  Initiate Treatment-Testing, Proceed toTreatment Area When Bed Available for ED Attending/MLP to Continue Care    Electronically signed by WILLY Valentine CNP   DD: 1/2/24

## 2024-01-02 NOTE — ED PROVIDER NOTES
[facility] EMERGENCY DEPARTMENT  EMERGENCY DEPARTMENT ENCOUNTER    Pt Name: Marisela Guillaume  MRN: [id number]  Birthdate 1950  Date of evaluation: 1/2/2024  Provider: Chalo Wilcox MD  PCP: Juliano Nelson MD  Note Started: 1:19 AM EST 1/2/24    HPI     Patient is a 73 y.o. female presents with a chief complaint of   Chief Complaint   Patient presents with    Shortness of Breath     Short of breath for 3-4 days, increasingly worse, was 85% on RA, was placed on oxygen at window. SPO2  went up to 97% after 4L of oxygen via NC, does not wear oxygen at home.    .    Patient presents for shortness of breath.  Patient reportedly has been short of breath over the past few days.  Patient reportedly has some chest discomfort associated with this.  Does not wear oxygen at home.  Is 85% on room air.  Patient denies any changes in urinary habits but is also having diarrhea and some nausea.    Nursing Notes were all reviewed and agreed with or any disagreements were addressed in the HPI.    History From: Patient    Review of Systems   Pertinent positives and negatives as per HPI.     Physical Exam  Vitals and nursing note reviewed.   Constitutional:       Appearance: She is well-developed.   HENT:      Head: Normocephalic and atraumatic.   Eyes:      Conjunctiva/sclera: Conjunctivae normal.   Cardiovascular:      Rate and Rhythm: Normal rate and regular rhythm.      Heart sounds: Normal heart sounds. No murmur heard.  Pulmonary:      Effort: Pulmonary effort is normal. No respiratory distress.      Breath sounds: Wheezing present. No rales.      Comments: Patient is mildly tachypneic and wheezy.  Abdominal:      General: Bowel sounds are normal.      Palpations: Abdomen is soft.      Tenderness: There is no abdominal tenderness. There is no guarding or rebound.   Musculoskeletal:      Cervical back: Normal range of motion and neck supple.   Skin:     General: Skin is warm and dry.

## 2024-01-03 ENCOUNTER — APPOINTMENT (OUTPATIENT)
Dept: GENERAL RADIOLOGY | Age: 74
DRG: 871 | End: 2024-01-03
Payer: MEDICARE

## 2024-01-03 ENCOUNTER — APPOINTMENT (OUTPATIENT)
Age: 74
DRG: 871 | End: 2024-01-03
Attending: INTERNAL MEDICINE
Payer: MEDICARE

## 2024-01-03 LAB
ALBUMIN SERPL-MCNC: 3.7 G/DL (ref 3.5–5.2)
ALP SERPL-CCNC: 180 U/L (ref 35–104)
ALT SERPL-CCNC: 61 U/L (ref 0–32)
ANION GAP SERPL CALCULATED.3IONS-SCNC: 14 MMOL/L (ref 7–16)
AST SERPL-CCNC: 76 U/L (ref 0–31)
B PARAP IS1001 DNA NPH QL NAA+NON-PROBE: NOT DETECTED
B PERT DNA SPEC QL NAA+PROBE: NOT DETECTED
B.E.: -3.1 MMOL/L (ref -3–3)
BASOPHILS # BLD: 0.1 K/UL (ref 0–0.2)
BASOPHILS NFR BLD: 0 % (ref 0–2)
BILIRUB SERPL-MCNC: 0.6 MG/DL (ref 0–1.2)
BUN SERPL-MCNC: 20 MG/DL (ref 6–23)
C PNEUM DNA NPH QL NAA+NON-PROBE: NOT DETECTED
CALCIUM SERPL-MCNC: 8.3 MG/DL (ref 8.6–10.2)
CHLORIDE SERPL-SCNC: 103 MMOL/L (ref 98–107)
CO2 SERPL-SCNC: 23 MMOL/L (ref 22–29)
COHB: 0.4 % (ref 0–1.5)
CREAT SERPL-MCNC: 1 MG/DL (ref 0.5–1)
CRITICAL: ABNORMAL
DATE ANALYZED: ABNORMAL
DATE OF COLLECTION: ABNORMAL
ECHO AV AREA PEAK VELOCITY: 2.6 CM2
ECHO AV AREA PEAK VELOCITY: 2.6 CM2
ECHO AV AREA PEAK VELOCITY: 2.7 CM2
ECHO AV AREA PEAK VELOCITY: 2.7 CM2
ECHO AV AREA VTI: 2.4 CM2
ECHO AV AREA/BSA VTI: 1.2 CM2/M2
ECHO AV CUSP MM: 2.2 CM
ECHO AV MEAN GRADIENT: 7 MMHG
ECHO AV MEAN VELOCITY: 1.3 M/S
ECHO AV PEAK GRADIENT: 12 MMHG
ECHO AV PEAK GRADIENT: 12 MMHG
ECHO AV PEAK VELOCITY: 1.8 M/S
ECHO AV PEAK VELOCITY: 1.8 M/S
ECHO AV VTI: 35.8 CM
ECHO BSA: 2.17 M2
ECHO EST RA PRESSURE: 3 MMHG
ECHO LA DIAMETER INDEX: 1.68 CM/M2
ECHO LA DIAMETER: 3.5 CM
ECHO LA VOL A-L A2C: 58 ML (ref 22–52)
ECHO LA VOL A-L A4C: 95 ML (ref 22–52)
ECHO LA VOL MOD A2C: 55 ML (ref 22–52)
ECHO LA VOL MOD A4C: 91 ML (ref 22–52)
ECHO LA VOLUME AREA LENGTH: 79 ML
ECHO LA VOLUME INDEX A-L A2C: 28 ML/M2 (ref 16–34)
ECHO LA VOLUME INDEX A-L A4C: 46 ML/M2 (ref 16–34)
ECHO LA VOLUME INDEX AREA LENGTH: 38 ML/M2 (ref 16–34)
ECHO LA VOLUME INDEX MOD A2C: 26 ML/M2 (ref 16–34)
ECHO LA VOLUME INDEX MOD A4C: 44 ML/M2 (ref 16–34)
ECHO LV FRACTIONAL SHORTENING: 40 % (ref 28–44)
ECHO LV INTERNAL DIMENSION DIASTOLE INDEX: 2.02 CM/M2
ECHO LV INTERNAL DIMENSION DIASTOLIC: 4.2 CM (ref 3.9–5.3)
ECHO LV INTERNAL DIMENSION SYSTOLIC INDEX: 1.2 CM/M2
ECHO LV INTERNAL DIMENSION SYSTOLIC: 2.5 CM
ECHO LV IVSD: 1.3 CM (ref 0.6–0.9)
ECHO LV IVSS: 1.9 CM
ECHO LV MASS 2D: 200.6 G (ref 67–162)
ECHO LV MASS INDEX 2D: 96.4 G/M2 (ref 43–95)
ECHO LV POSTERIOR WALL DIASTOLIC: 1.3 CM (ref 0.6–0.9)
ECHO LV POSTERIOR WALL SYSTOLIC: 1.9 CM
ECHO LV RELATIVE WALL THICKNESS RATIO: 0.62
ECHO LVOT AREA: 3.1 CM2
ECHO LVOT AV VTI INDEX: 0.74
ECHO LVOT DIAM: 2 CM
ECHO LVOT MEAN GRADIENT: 4 MMHG
ECHO LVOT PEAK GRADIENT: 8 MMHG
ECHO LVOT PEAK GRADIENT: 8 MMHG
ECHO LVOT PEAK VELOCITY: 1.4 M/S
ECHO LVOT PEAK VELOCITY: 1.5 M/S
ECHO LVOT STROKE VOLUME INDEX: 40 ML/M2
ECHO LVOT SV: 83.2 ML
ECHO LVOT VTI: 26.5 CM
ECHO MV "A" WAVE DURATION: 147.6 MSEC
ECHO MV A VELOCITY: 0.79 M/S
ECHO MV AREA PHT: 4.6 CM2
ECHO MV AREA VTI: 3.4 CM2
ECHO MV E DECELERATION TIME (DT): 158 MS
ECHO MV E VELOCITY: 0.67 M/S
ECHO MV E/A RATIO: 0.85
ECHO MV LVOT VTI INDEX: 0.91
ECHO MV MAX VELOCITY: 1.1 M/S
ECHO MV MEAN GRADIENT: 2 MMHG
ECHO MV MEAN VELOCITY: 0.7 M/S
ECHO MV PEAK GRADIENT: 5 MMHG
ECHO MV PRESSURE HALF TIME (PHT): 47.8 MS
ECHO MV VTI: 24.2 CM
ECHO PV MAX VELOCITY: 1.2 M/S
ECHO PV MEAN GRADIENT: 3 MMHG
ECHO PV MEAN VELOCITY: 0.9 M/S
ECHO PV PEAK GRADIENT: 6 MMHG
ECHO PV VTI: 22.1 CM
ECHO PVEIN A DURATION: 147.6 MS
ECHO PVEIN A VELOCITY: 0.4 M/S
ECHO PVEIN PEAK D VELOCITY: 0.7 M/S
ECHO PVEIN PEAK S VELOCITY: 1.1 M/S
ECHO PVEIN S/D RATIO: 1.6
ECHO RIGHT VENTRICULAR SYSTOLIC PRESSURE (RVSP): 25 MMHG
ECHO RV INTERNAL DIMENSION: 3.1 CM
ECHO RVOT PEAK GRADIENT: 2 MMHG
ECHO RVOT PEAK VELOCITY: 0.8 M/S
ECHO TV REGURGITANT MAX VELOCITY: 2.36 M/S
ECHO TV REGURGITANT PEAK GRADIENT: 22 MMHG
EOSINOPHIL # BLD: 0 K/UL (ref 0.05–0.5)
EOSINOPHILS RELATIVE PERCENT: 0 % (ref 0–6)
ERYTHROCYTE [DISTWIDTH] IN BLOOD BY AUTOMATED COUNT: 13.3 % (ref 11.5–15)
FLUAV RNA NPH QL NAA+NON-PROBE: NOT DETECTED
FLUBV RNA NPH QL NAA+NON-PROBE: NOT DETECTED
GFR SERPL CREATININE-BSD FRML MDRD: 57 ML/MIN/1.73M2
GLUCOSE BLD-MCNC: 141 MG/DL (ref 74–99)
GLUCOSE BLD-MCNC: 153 MG/DL (ref 74–99)
GLUCOSE BLD-MCNC: 160 MG/DL (ref 74–99)
GLUCOSE BLD-MCNC: 216 MG/DL (ref 74–99)
GLUCOSE SERPL-MCNC: 148 MG/DL (ref 74–99)
HADV DNA NPH QL NAA+NON-PROBE: NOT DETECTED
HCO3: 20.8 MMOL/L (ref 22–26)
HCOV 229E RNA NPH QL NAA+NON-PROBE: NOT DETECTED
HCOV HKU1 RNA NPH QL NAA+NON-PROBE: NOT DETECTED
HCOV NL63 RNA NPH QL NAA+NON-PROBE: NOT DETECTED
HCOV OC43 RNA NPH QL NAA+NON-PROBE: NOT DETECTED
HCT VFR BLD AUTO: 34.3 % (ref 34–48)
HGB BLD-MCNC: 11.2 G/DL (ref 11.5–15.5)
HHB: 1.2 % (ref 0–5)
HMPV RNA NPH QL NAA+NON-PROBE: NOT DETECTED
HPIV1 RNA NPH QL NAA+NON-PROBE: NOT DETECTED
HPIV2 RNA NPH QL NAA+NON-PROBE: NOT DETECTED
HPIV3 RNA NPH QL NAA+NON-PROBE: NOT DETECTED
HPIV4 RNA NPH QL NAA+NON-PROBE: NOT DETECTED
LAB: ABNORMAL
LYMPHOCYTES NFR BLD: 1.6 K/UL (ref 1.5–4)
LYMPHOCYTES RELATIVE PERCENT: 6 % (ref 20–42)
Lab: 355
M PNEUMO DNA NPH QL NAA+NON-PROBE: NOT DETECTED
MAGNESIUM SERPL-MCNC: 2.3 MG/DL (ref 1.6–2.6)
MCH RBC QN AUTO: 31.3 PG (ref 26–35)
MCHC RBC AUTO-ENTMCNC: 32.7 G/DL (ref 32–34.5)
MCV RBC AUTO: 95.8 FL (ref 80–99.9)
METHB: 0.1 % (ref 0–1.5)
MODE: ABNORMAL
MONOCYTES NFR BLD: 1.93 K/UL (ref 0.1–0.95)
MONOCYTES NFR BLD: 7 % (ref 2–12)
NEUTROPHILS NFR BLD: 84 % (ref 43–80)
NEUTS SEG NFR BLD: 23.36 K/UL (ref 1.8–7.3)
O2 CONTENT: 16.8 ML/DL
O2 SATURATION: 98.8 % (ref 92–98.5)
O2HB: 98.3 % (ref 94–97)
OPERATOR ID: 5523
PATIENT TEMP: 37 C
PCO2: 33.5 MMHG (ref 35–45)
PH BLOOD GAS: 7.41 (ref 7.35–7.45)
PLATELET # BLD AUTO: 246 K/UL (ref 130–450)
PMV BLD AUTO: 12.1 FL (ref 7–12)
PO2: 118 MMHG (ref 75–100)
POTASSIUM SERPL-SCNC: 3.3 MMOL/L (ref 3.5–5)
PROT SERPL-MCNC: 7.3 G/DL (ref 6.4–8.3)
RBC # BLD AUTO: 3.58 M/UL (ref 3.5–5.5)
RSV RNA NPH QL NAA+NON-PROBE: DETECTED
RV+EV RNA NPH QL NAA+NON-PROBE: NOT DETECTED
SARS-COV-2 RNA NPH QL NAA+NON-PROBE: NOT DETECTED
SODIUM SERPL-SCNC: 140 MMOL/L (ref 132–146)
SOURCE, BLOOD GAS: ABNORMAL
SPECIMEN DESCRIPTION: ABNORMAL
THB: 12 G/DL (ref 11.5–16.5)
TIME ANALYZED: 359
WBC OTHER # BLD: 27.7 K/UL (ref 4.5–11.5)

## 2024-01-03 PROCEDURE — 82805 BLOOD GASES W/O2 SATURATION: CPT

## 2024-01-03 PROCEDURE — 85025 COMPLETE CBC W/AUTO DIFF WBC: CPT

## 2024-01-03 PROCEDURE — 87081 CULTURE SCREEN ONLY: CPT

## 2024-01-03 PROCEDURE — 87899 AGENT NOS ASSAY W/OPTIC: CPT

## 2024-01-03 PROCEDURE — 83735 ASSAY OF MAGNESIUM: CPT

## 2024-01-03 PROCEDURE — 0202U NFCT DS 22 TRGT SARS-COV-2: CPT

## 2024-01-03 PROCEDURE — 6360000002 HC RX W HCPCS: Performed by: INTERNAL MEDICINE

## 2024-01-03 PROCEDURE — 87449 NOS EACH ORGANISM AG IA: CPT

## 2024-01-03 PROCEDURE — 80053 COMPREHEN METABOLIC PANEL: CPT

## 2024-01-03 PROCEDURE — 84145 PROCALCITONIN (PCT): CPT

## 2024-01-03 PROCEDURE — 86738 MYCOPLASMA ANTIBODY: CPT

## 2024-01-03 PROCEDURE — 6370000000 HC RX 637 (ALT 250 FOR IP): Performed by: INTERNAL MEDICINE

## 2024-01-03 PROCEDURE — 93306 TTE W/DOPPLER COMPLETE: CPT | Performed by: INTERNAL MEDICINE

## 2024-01-03 PROCEDURE — 2060000000 HC ICU INTERMEDIATE R&B

## 2024-01-03 PROCEDURE — 93306 TTE W/DOPPLER COMPLETE: CPT

## 2024-01-03 PROCEDURE — 94640 AIRWAY INHALATION TREATMENT: CPT

## 2024-01-03 PROCEDURE — 36415 COLL VENOUS BLD VENIPUNCTURE: CPT

## 2024-01-03 PROCEDURE — 82962 GLUCOSE BLOOD TEST: CPT

## 2024-01-03 PROCEDURE — 99223 1ST HOSP IP/OBS HIGH 75: CPT | Performed by: INTERNAL MEDICINE

## 2024-01-03 PROCEDURE — 2580000003 HC RX 258: Performed by: INTERNAL MEDICINE

## 2024-01-03 PROCEDURE — 71046 X-RAY EXAM CHEST 2 VIEWS: CPT

## 2024-01-03 PROCEDURE — 2700000000 HC OXYGEN THERAPY PER DAY

## 2024-01-03 RX ORDER — DEXTROSE MONOHYDRATE 100 MG/ML
INJECTION, SOLUTION INTRAVENOUS CONTINUOUS PRN
Status: DISCONTINUED | OUTPATIENT
Start: 2024-01-03 | End: 2024-01-08 | Stop reason: HOSPADM

## 2024-01-03 RX ORDER — INSULIN LISPRO 100 [IU]/ML
0-4 INJECTION, SOLUTION INTRAVENOUS; SUBCUTANEOUS
Status: DISCONTINUED | OUTPATIENT
Start: 2024-01-03 | End: 2024-01-08 | Stop reason: HOSPADM

## 2024-01-03 RX ORDER — INSULIN LISPRO 100 [IU]/ML
0-4 INJECTION, SOLUTION INTRAVENOUS; SUBCUTANEOUS NIGHTLY
Status: DISCONTINUED | OUTPATIENT
Start: 2024-01-03 | End: 2024-01-08 | Stop reason: HOSPADM

## 2024-01-03 RX ORDER — IPRATROPIUM BROMIDE AND ALBUTEROL SULFATE 2.5; .5 MG/3ML; MG/3ML
1 SOLUTION RESPIRATORY (INHALATION) EVERY 6 HOURS
Status: DISCONTINUED | OUTPATIENT
Start: 2024-01-03 | End: 2024-01-07

## 2024-01-03 RX ORDER — SODIUM CHLORIDE, SODIUM LACTATE, POTASSIUM CHLORIDE, CALCIUM CHLORIDE 600; 310; 30; 20 MG/100ML; MG/100ML; MG/100ML; MG/100ML
INJECTION, SOLUTION INTRAVENOUS CONTINUOUS
Status: DISCONTINUED | OUTPATIENT
Start: 2024-01-03 | End: 2024-01-08 | Stop reason: HOSPADM

## 2024-01-03 RX ORDER — AZITHROMYCIN 250 MG/1
500 TABLET, FILM COATED ORAL EVERY 24 HOURS
Status: COMPLETED | OUTPATIENT
Start: 2024-01-04 | End: 2024-01-06

## 2024-01-03 RX ADMIN — GUAIFENESIN AND DEXTROMETHORPHAN 5 ML: 100; 10 SYRUP ORAL at 09:19

## 2024-01-03 RX ADMIN — ENOXAPARIN SODIUM 30 MG: 100 INJECTION SUBCUTANEOUS at 20:34

## 2024-01-03 RX ADMIN — CEFEPIME 2000 MG: 2 INJECTION, POWDER, FOR SOLUTION INTRAVENOUS at 16:22

## 2024-01-03 RX ADMIN — IPRATROPIUM BROMIDE AND ALBUTEROL SULFATE 1 DOSE: .5; 2.5 SOLUTION RESPIRATORY (INHALATION) at 03:49

## 2024-01-03 RX ADMIN — IPRATROPIUM BROMIDE AND ALBUTEROL SULFATE 1 DOSE: .5; 2.5 SOLUTION RESPIRATORY (INHALATION) at 21:30

## 2024-01-03 RX ADMIN — GUAIFENESIN AND DEXTROMETHORPHAN 5 ML: 100; 10 SYRUP ORAL at 16:17

## 2024-01-03 RX ADMIN — VANCOMYCIN HYDROCHLORIDE 750 MG: 10 INJECTION, POWDER, LYOPHILIZED, FOR SOLUTION INTRAVENOUS at 12:26

## 2024-01-03 RX ADMIN — AZITHROMYCIN DIHYDRATE 500 MG: 250 TABLET ORAL at 09:19

## 2024-01-03 RX ADMIN — IPRATROPIUM BROMIDE AND ALBUTEROL SULFATE 1 DOSE: .5; 2.5 SOLUTION RESPIRATORY (INHALATION) at 15:33

## 2024-01-03 RX ADMIN — WATER 125 MG: 1 INJECTION INTRAMUSCULAR; INTRAVENOUS; SUBCUTANEOUS at 04:09

## 2024-01-03 RX ADMIN — WATER 1000 MG: 1 INJECTION INTRAMUSCULAR; INTRAVENOUS; SUBCUTANEOUS at 04:09

## 2024-01-03 RX ADMIN — VANCOMYCIN HYDROCHLORIDE 750 MG: 10 INJECTION, POWDER, LYOPHILIZED, FOR SOLUTION INTRAVENOUS at 01:37

## 2024-01-03 RX ADMIN — ALBUTEROL SULFATE 2.5 MG: 2.5 SOLUTION RESPIRATORY (INHALATION) at 07:37

## 2024-01-03 RX ADMIN — GUAIFENESIN AND DEXTROMETHORPHAN 5 ML: 100; 10 SYRUP ORAL at 03:24

## 2024-01-03 RX ADMIN — INSULIN LISPRO 1 UNITS: 100 INJECTION, SOLUTION INTRAVENOUS; SUBCUTANEOUS at 12:25

## 2024-01-03 RX ADMIN — IPRATROPIUM BROMIDE AND ALBUTEROL SULFATE 1 DOSE: .5; 2.5 SOLUTION RESPIRATORY (INHALATION) at 09:59

## 2024-01-03 RX ADMIN — SODIUM CHLORIDE, POTASSIUM CHLORIDE, SODIUM LACTATE AND CALCIUM CHLORIDE: 600; 310; 30; 20 INJECTION, SOLUTION INTRAVENOUS at 16:19

## 2024-01-03 RX ADMIN — ENOXAPARIN SODIUM 30 MG: 100 INJECTION SUBCUTANEOUS at 09:19

## 2024-01-03 RX ADMIN — Medication 10 ML: at 20:36

## 2024-01-03 RX ADMIN — METHYLPREDNISOLONE SODIUM SUCCINATE 20 MG: 40 INJECTION, POWDER, LYOPHILIZED, FOR SOLUTION INTRAMUSCULAR; INTRAVENOUS at 20:34

## 2024-01-03 NOTE — CONSULTS
Assessment and Plan  Patient is a 73 y.o. female with the following medical Problems:   Acute hypoxic respiratory failure requiring supplemental oxygen  Left lung pneumonia  RSV pneumonia.  Morbid obesity  Suspected obstructive sleep apnea  CKD stage III  Chronic back pain  Depression    Plan of care:  Broad-spectrum antibiotic with cefepime, azithromycin, and vancomycin.  Pneumonia workup including Legionella urinary antigen, strep urinary antigen, mycoplasma IgM, procalcitonin level, and MRSA swab.  Respiratory viral panel  DVT prophylaxis  Scheduled DuoNeb  Incentive spirometer  Solu-Medrol 20 mg twice daily  Maintenance IV fluid since patient just received contrast.    History of Present Illness:   Patient is a 73-year-old -American woman with above-mentioned medical problems who was admitted on January 2, 2023 with progressive shortness of breath and cough.  Patient was initially on 4 L nasal cannula.  She denies fever.  CTA ruled out PE but showed left lung pneumonia.    Past Medical History:  Past Medical History:   Diagnosis Date    Hypertension     PONV (postoperative nausea and vomiting)       Past Surgical History:   Procedure Laterality Date    ANKLE FRACTURE SURGERY Left 7/20/2022    LEFT ANKLE EXTERNAL FIXATOR performed by Shamar Díaz DO at [facility] OR    ANKLE FRACTURE SURGERY Left 7/25/2022    LEFT ANKLE OPEN REDUCTION INTERNAL FIXATION **DO NOT CHANGE TIME** performed by Shamar Díaz DO at [facility] OR    HYSTERECTOMY (CERVIX STATUS UNKNOWN)         Family History:   History reviewed. No pertinent family history.    Allergies:         Codeine    Social history:  Social History     Socioeconomic History    Marital status: Single     Spouse name: Not on file    Number of children: Not on file    Years of education: Not on file    Highest education level: Not on file   Occupational History    Not on file   Tobacco Use    Smoking status: Never    Smokeless tobacco: Never   Substance and Sexual Activity

## 2024-01-03 NOTE — H&P
Department of Internal Medicine  History and Physical    PCP: Dr. Juliano sullivan  Admitting Physician: Dr. Juliano sullivan  Consultants:        CHIEF COMPLAINT:  Shortness of breath for the last 3-4 days    HISTORY OF PRESENT ILLNESS:    This is a very pleasant 73 years old -American lady with past medical history significant for hypertension known toxic goiter peripheral vascular disease osteoarthritis morbid obesity chronic kidney disease stage III chronic low back pain depression who presents to the emergency room with history of 3-4 days of significant increasing shortness of breath was noted to be hypoxemic with a pulse ox of 85% on room air was given oxygen with improvement in his PO2 to 97% on 4 L of oxygen via nasal cannula.  Patient denies any fever or chills she is complaining of cough along with the shortness of breath.  No dysuria does complain of some nausea and diarrhea.patient was admitted to the hospital for further workup, blood work in the ER revealed a white count of almost 23,000 chest x-ray showing pneumonitis.  According to the ER chart it was noted that the patient had some chest discomfort also.    PAST MEDICAL Hx:  Past Medical History:   Diagnosis Date    Hypertension     PONV (postoperative nausea and vomiting)        PAST SURGICAL Hx:   Past Surgical History:   Procedure Laterality Date    ANKLE FRACTURE SURGERY Left 7/20/2022    LEFT ANKLE EXTERNAL FIXATOR performed by Shamar Díaz DO at [facility] OR    ANKLE FRACTURE SURGERY Left 7/25/2022    LEFT ANKLE OPEN REDUCTION INTERNAL FIXATION **DO NOT CHANGE TIME** performed by Shamar Díaz DO at [facility] OR    HYSTERECTOMY (CERVIX STATUS UNKNOWN)         FAMILY Hx:  History reviewed. No pertinent family history.    HOME MEDICATIONS:  Prior to Admission medications    Medication Sig Start Date End Date Taking? Authorizing Provider   Omega-3 Fatty Acids (FISH OIL) 1000 MG capsule Take 1 capsule by mouth daily   Yes Provider, MD Anjana

## 2024-01-03 NOTE — CARE COORDINATION
Case Management Assessment  Initial Evaluation    Date/Time of Evaluation: 1/3/2024 4:05 PM  Assessment Completed by: MINAL Flores    If patient is discharged prior to next notation, then this note serves as note for discharge by case management.    Patient Name: Marisela Guillaume                   YOB: 1950  Diagnosis: Acute respiratory failure (HCC) [J96.00]  Acute respiratory failure with hypoxia (HCC) [J96.01]  Pneumonia of left lower lobe due to infectious organism [J18.9]                   Date / Time: 1/2/2024  1:02 AM    Patient Admission Status: Inpatient   Readmission Risk (Low < 19, Mod (19-27), High > 27): Readmission Risk Score: 10.6    Current PCP: Juliano Nelson MD  PCP verified by CM? Yes    Chart Reviewed: Yes      History Provided by: Patient  Patient Orientation: Alert and Oriented    Patient Cognition: Alert    Hospitalization in the last 30 days (Readmission):  No    If yes, Readmission Assessment in CM Navigator will be completed.    Advance Directives:      Code Status: Full Code   Patient's Primary Decision Maker is: Legal Next of Kin    Primary Decision Maker: Brannon Guillaume - Child - [phone]    Discharge Planning:    Patient lives with: Alone Type of Home: House  Primary Care Giver: Self  Patient Support Systems include: Friends/Neighbors   Current Financial resources:    Current community resources:    Current services prior to admission: None            Current DME:              Type of Home Care services:  None    ADLS  Prior functional level: Independent in ADLs/IADLs  Current functional level: Independent in ADLs/IADLs    PT AM-PAC:   /24  OT AM-PAC:   /24    Family can provide assistance at DC: No  Would you like Case Management to discuss the discharge plan with any other family members/significant others, and if so, who? No  Plans to Return to Present Housing: Yes  Other Identified Issues/Barriers to RETURNING to current housing: none noted   Potential Assistance

## 2024-01-03 NOTE — ACP (ADVANCE CARE PLANNING)
Advance Care Planning   Healthcare Decision Maker:    Primary Decision Maker: Brannon Guillaume - [facility] - [phone]    Click here to complete Healthcare Decision Makers including selection of the Healthcare Decision Maker Relationship (ie \"Primary\").  Today we documented Decision Maker(s) consistent with Legal Next of Kin hierarchy.

## 2024-01-04 LAB
GLUCOSE BLD-MCNC: 126 MG/DL (ref 74–99)
GLUCOSE BLD-MCNC: 164 MG/DL (ref 74–99)
GLUCOSE BLD-MCNC: 185 MG/DL (ref 74–99)
GLUCOSE BLD-MCNC: 202 MG/DL (ref 74–99)
L PNEUMO1 AG UR QL IA.RAPID: NEGATIVE
MICROORGANISM SPEC CULT: NORMAL
MYCOPLASMA AB,IGM: NORMAL
PROCALCITONIN SERPL-MCNC: 0.37 NG/ML (ref 0–0.08)
S PNEUM AG SPEC QL: NEGATIVE
SPECIMEN DESCRIPTION: NORMAL
SPECIMEN SOURCE: NORMAL

## 2024-01-04 PROCEDURE — 2580000003 HC RX 258: Performed by: INTERNAL MEDICINE

## 2024-01-04 PROCEDURE — 6360000002 HC RX W HCPCS: Performed by: INTERNAL MEDICINE

## 2024-01-04 PROCEDURE — 99233 SBSQ HOSP IP/OBS HIGH 50: CPT | Performed by: INTERNAL MEDICINE

## 2024-01-04 PROCEDURE — 6370000000 HC RX 637 (ALT 250 FOR IP): Performed by: INTERNAL MEDICINE

## 2024-01-04 PROCEDURE — 94640 AIRWAY INHALATION TREATMENT: CPT

## 2024-01-04 PROCEDURE — 97110 THERAPEUTIC EXERCISES: CPT

## 2024-01-04 PROCEDURE — 2700000000 HC OXYGEN THERAPY PER DAY

## 2024-01-04 PROCEDURE — 82962 GLUCOSE BLOOD TEST: CPT

## 2024-01-04 PROCEDURE — 2060000000 HC ICU INTERMEDIATE R&B

## 2024-01-04 PROCEDURE — 97161 PT EVAL LOW COMPLEX 20 MIN: CPT

## 2024-01-04 RX ORDER — BUDESONIDE 0.5 MG/2ML
0.5 INHALANT ORAL
Status: DISCONTINUED | OUTPATIENT
Start: 2024-01-04 | End: 2024-01-07

## 2024-01-04 RX ADMIN — ENOXAPARIN SODIUM 30 MG: 100 INJECTION SUBCUTANEOUS at 09:56

## 2024-01-04 RX ADMIN — VANCOMYCIN HYDROCHLORIDE 750 MG: 10 INJECTION, POWDER, LYOPHILIZED, FOR SOLUTION INTRAVENOUS at 12:45

## 2024-01-04 RX ADMIN — IPRATROPIUM BROMIDE AND ALBUTEROL SULFATE 1 DOSE: .5; 2.5 SOLUTION RESPIRATORY (INHALATION) at 10:00

## 2024-01-04 RX ADMIN — AZITHROMYCIN DIHYDRATE 500 MG: 250 TABLET ORAL at 09:48

## 2024-01-04 RX ADMIN — Medication 10 ML: at 07:54

## 2024-01-04 RX ADMIN — IPRATROPIUM BROMIDE AND ALBUTEROL SULFATE 1 DOSE: .5; 2.5 SOLUTION RESPIRATORY (INHALATION) at 07:00

## 2024-01-04 RX ADMIN — METHYLPREDNISOLONE SODIUM SUCCINATE 20 MG: 40 INJECTION, POWDER, LYOPHILIZED, FOR SOLUTION INTRAMUSCULAR; INTRAVENOUS at 09:53

## 2024-01-04 RX ADMIN — VANCOMYCIN HYDROCHLORIDE 750 MG: 10 INJECTION, POWDER, LYOPHILIZED, FOR SOLUTION INTRAVENOUS at 00:24

## 2024-01-04 RX ADMIN — METHYLPREDNISOLONE SODIUM SUCCINATE 40 MG: 40 INJECTION, POWDER, LYOPHILIZED, FOR SOLUTION INTRAMUSCULAR; INTRAVENOUS at 21:31

## 2024-01-04 RX ADMIN — ENOXAPARIN SODIUM 30 MG: 100 INJECTION SUBCUTANEOUS at 21:30

## 2024-01-04 RX ADMIN — BUDESONIDE INHALATION 500 MCG: 0.5 SUSPENSION RESPIRATORY (INHALATION) at 18:59

## 2024-01-04 RX ADMIN — CEFEPIME 2000 MG: 2 INJECTION, POWDER, FOR SOLUTION INTRAVENOUS at 05:58

## 2024-01-04 RX ADMIN — IPRATROPIUM BROMIDE AND ALBUTEROL SULFATE 1 DOSE: .5; 2.5 SOLUTION RESPIRATORY (INHALATION) at 18:59

## 2024-01-04 RX ADMIN — SODIUM CHLORIDE, POTASSIUM CHLORIDE, SODIUM LACTATE AND CALCIUM CHLORIDE: 600; 310; 30; 20 INJECTION, SOLUTION INTRAVENOUS at 21:30

## 2024-01-04 RX ADMIN — CEFEPIME 2000 MG: 2 INJECTION, POWDER, FOR SOLUTION INTRAVENOUS at 16:13

## 2024-01-04 RX ADMIN — SODIUM CHLORIDE, POTASSIUM CHLORIDE, SODIUM LACTATE AND CALCIUM CHLORIDE: 600; 310; 30; 20 INJECTION, SOLUTION INTRAVENOUS at 06:10

## 2024-01-04 ASSESSMENT — PAIN SCALES - GENERAL
PAINLEVEL_OUTOF10: 0
PAINLEVEL_OUTOF10: 0

## 2024-01-04 NOTE — CARE COORDINATION
SOCIAL WORK / DISCHARGE PLANNING:  RSV +. Dc plan remains to return home alone as previous. 3L O2, WILL WATCH FOR HOME O2 needs, WILL NEED WALK TEST AND IF QUALIFIES SCRIPT FOR ARRANGEMENTS. Pt has neighbors who look in on her prn. She drove to hospital and plans to drive self home.           Electronically signed by MINAL Flores on 1/4/2024 at 2:17 PM

## 2024-01-05 LAB
ANION GAP SERPL CALCULATED.3IONS-SCNC: 10 MMOL/L (ref 7–16)
BUN SERPL-MCNC: 19 MG/DL (ref 6–23)
CALCIUM SERPL-MCNC: 8.5 MG/DL (ref 8.6–10.2)
CHLORIDE SERPL-SCNC: 104 MMOL/L (ref 98–107)
CO2 SERPL-SCNC: 25 MMOL/L (ref 22–29)
CREAT SERPL-MCNC: 0.9 MG/DL (ref 0.5–1)
DATE LAST DOSE: NORMAL
ERYTHROCYTE [DISTWIDTH] IN BLOOD BY AUTOMATED COUNT: 13.4 % (ref 11.5–15)
GFR SERPL CREATININE-BSD FRML MDRD: >60 ML/MIN/1.73M2
GLUCOSE BLD-MCNC: 143 MG/DL (ref 74–99)
GLUCOSE BLD-MCNC: 171 MG/DL (ref 74–99)
GLUCOSE BLD-MCNC: 173 MG/DL (ref 74–99)
GLUCOSE BLD-MCNC: 199 MG/DL (ref 74–99)
GLUCOSE SERPL-MCNC: 126 MG/DL (ref 74–99)
HCT VFR BLD AUTO: 31.3 % (ref 34–48)
HGB BLD-MCNC: 10.1 G/DL (ref 11.5–15.5)
MCH RBC QN AUTO: 31.1 PG (ref 26–35)
MCHC RBC AUTO-ENTMCNC: 32.3 G/DL (ref 32–34.5)
MCV RBC AUTO: 96.3 FL (ref 80–99.9)
PLATELET # BLD AUTO: 296 K/UL (ref 130–450)
PMV BLD AUTO: 11.5 FL (ref 7–12)
POTASSIUM SERPL-SCNC: 3.6 MMOL/L (ref 3.5–5)
RBC # BLD AUTO: 3.25 M/UL (ref 3.5–5.5)
SODIUM SERPL-SCNC: 139 MMOL/L (ref 132–146)
TME LAST DOSE: NORMAL H
VANCOMYCIN DOSE: NORMAL MG
VANCOMYCIN SERPL-MCNC: 11.2 UG/ML (ref 5–40)
WBC OTHER # BLD: 25.7 K/UL (ref 4.5–11.5)

## 2024-01-05 PROCEDURE — 82962 GLUCOSE BLOOD TEST: CPT

## 2024-01-05 PROCEDURE — 85027 COMPLETE CBC AUTOMATED: CPT

## 2024-01-05 PROCEDURE — 2700000000 HC OXYGEN THERAPY PER DAY

## 2024-01-05 PROCEDURE — 6370000000 HC RX 637 (ALT 250 FOR IP): Performed by: INTERNAL MEDICINE

## 2024-01-05 PROCEDURE — 6360000002 HC RX W HCPCS: Performed by: INTERNAL MEDICINE

## 2024-01-05 PROCEDURE — 97535 SELF CARE MNGMENT TRAINING: CPT

## 2024-01-05 PROCEDURE — 2580000003 HC RX 258: Performed by: INTERNAL MEDICINE

## 2024-01-05 PROCEDURE — 80048 BASIC METABOLIC PNL TOTAL CA: CPT

## 2024-01-05 PROCEDURE — 99233 SBSQ HOSP IP/OBS HIGH 50: CPT | Performed by: INTERNAL MEDICINE

## 2024-01-05 PROCEDURE — 2060000000 HC ICU INTERMEDIATE R&B

## 2024-01-05 PROCEDURE — 97165 OT EVAL LOW COMPLEX 30 MIN: CPT

## 2024-01-05 PROCEDURE — 36415 COLL VENOUS BLD VENIPUNCTURE: CPT

## 2024-01-05 PROCEDURE — 80202 ASSAY OF VANCOMYCIN: CPT

## 2024-01-05 PROCEDURE — 94640 AIRWAY INHALATION TREATMENT: CPT

## 2024-01-05 RX ORDER — PREDNISONE 20 MG/1
40 TABLET ORAL DAILY
Status: DISCONTINUED | OUTPATIENT
Start: 2024-01-06 | End: 2024-01-06

## 2024-01-05 RX ADMIN — VANCOMYCIN HYDROCHLORIDE 750 MG: 10 INJECTION, POWDER, LYOPHILIZED, FOR SOLUTION INTRAVENOUS at 01:32

## 2024-01-05 RX ADMIN — IPRATROPIUM BROMIDE AND ALBUTEROL SULFATE 1 DOSE: .5; 2.5 SOLUTION RESPIRATORY (INHALATION) at 10:44

## 2024-01-05 RX ADMIN — BUDESONIDE INHALATION 500 MCG: 0.5 SUSPENSION RESPIRATORY (INHALATION) at 18:38

## 2024-01-05 RX ADMIN — SODIUM CHLORIDE, POTASSIUM CHLORIDE, SODIUM LACTATE AND CALCIUM CHLORIDE: 600; 310; 30; 20 INJECTION, SOLUTION INTRAVENOUS at 11:55

## 2024-01-05 RX ADMIN — Medication 10 ML: at 10:12

## 2024-01-05 RX ADMIN — BUDESONIDE INHALATION 500 MCG: 0.5 SUSPENSION RESPIRATORY (INHALATION) at 06:16

## 2024-01-05 RX ADMIN — CEFEPIME 2000 MG: 2 INJECTION, POWDER, FOR SOLUTION INTRAVENOUS at 05:39

## 2024-01-05 RX ADMIN — IPRATROPIUM BROMIDE AND ALBUTEROL SULFATE 1 DOSE: .5; 2.5 SOLUTION RESPIRATORY (INHALATION) at 22:10

## 2024-01-05 RX ADMIN — ENOXAPARIN SODIUM 30 MG: 100 INJECTION SUBCUTANEOUS at 20:27

## 2024-01-05 RX ADMIN — Medication 10 ML: at 20:27

## 2024-01-05 RX ADMIN — ENOXAPARIN SODIUM 30 MG: 100 INJECTION SUBCUTANEOUS at 10:22

## 2024-01-05 RX ADMIN — WATER 1000 MG: 1 INJECTION INTRAMUSCULAR; INTRAVENOUS; SUBCUTANEOUS at 14:59

## 2024-01-05 RX ADMIN — IPRATROPIUM BROMIDE AND ALBUTEROL SULFATE 1 DOSE: .5; 2.5 SOLUTION RESPIRATORY (INHALATION) at 18:38

## 2024-01-05 RX ADMIN — IPRATROPIUM BROMIDE AND ALBUTEROL SULFATE 1 DOSE: .5; 2.5 SOLUTION RESPIRATORY (INHALATION) at 00:43

## 2024-01-05 RX ADMIN — METHYLPREDNISOLONE SODIUM SUCCINATE 40 MG: 40 INJECTION, POWDER, LYOPHILIZED, FOR SOLUTION INTRAMUSCULAR; INTRAVENOUS at 10:14

## 2024-01-05 RX ADMIN — IPRATROPIUM BROMIDE AND ALBUTEROL SULFATE 1 DOSE: .5; 2.5 SOLUTION RESPIRATORY (INHALATION) at 06:16

## 2024-01-05 RX ADMIN — AZITHROMYCIN DIHYDRATE 500 MG: 250 TABLET ORAL at 10:13

## 2024-01-05 ASSESSMENT — PAIN SCALES - GENERAL: PAINLEVEL_OUTOF10: 0

## 2024-01-05 NOTE — CARE COORDINATION
SOCIAL WORK / DISCHARGE PLANNING:  RSV+ SW met with pt at bedside. She is ambulating to BR with her SPC. She is now weaned down to 1.5L. She remains hopeful not to return home with oxygen. Fatou Walker rep [phone]  . WILL NEED WALK TEST AND IF QUALIFIES SCRIPT FOR ARRANGEMENTS. Denies any other needs. Drove self to hospital, will drive self home.           Electronically signed by MINAL Flores on 1/5/2024 at 1:27 PM

## 2024-01-06 LAB
ANION GAP SERPL CALCULATED.3IONS-SCNC: 12 MMOL/L (ref 7–16)
BUN SERPL-MCNC: 14 MG/DL (ref 6–23)
CALCIUM SERPL-MCNC: 8.6 MG/DL (ref 8.6–10.2)
CHLORIDE SERPL-SCNC: 100 MMOL/L (ref 98–107)
CO2 SERPL-SCNC: 27 MMOL/L (ref 22–29)
CREAT SERPL-MCNC: 0.8 MG/DL (ref 0.5–1)
ERYTHROCYTE [DISTWIDTH] IN BLOOD BY AUTOMATED COUNT: 13.3 % (ref 11.5–15)
GFR SERPL CREATININE-BSD FRML MDRD: >60 ML/MIN/1.73M2
GLUCOSE BLD-MCNC: 134 MG/DL (ref 74–99)
GLUCOSE BLD-MCNC: 154 MG/DL (ref 74–99)
GLUCOSE BLD-MCNC: 187 MG/DL (ref 74–99)
GLUCOSE BLD-MCNC: 89 MG/DL (ref 74–99)
GLUCOSE SERPL-MCNC: 70 MG/DL (ref 74–99)
HCT VFR BLD AUTO: 35.1 % (ref 34–48)
HGB BLD-MCNC: 11.4 G/DL (ref 11.5–15.5)
MCH RBC QN AUTO: 31.7 PG (ref 26–35)
MCHC RBC AUTO-ENTMCNC: 32.5 G/DL (ref 32–34.5)
MCV RBC AUTO: 97.5 FL (ref 80–99.9)
PLATELET # BLD AUTO: 355 K/UL (ref 130–450)
PMV BLD AUTO: 11.5 FL (ref 7–12)
POTASSIUM SERPL-SCNC: 3.4 MMOL/L (ref 3.5–5)
RBC # BLD AUTO: 3.6 M/UL (ref 3.5–5.5)
SODIUM SERPL-SCNC: 139 MMOL/L (ref 132–146)
WBC OTHER # BLD: 23.1 K/UL (ref 4.5–11.5)

## 2024-01-06 PROCEDURE — 6360000002 HC RX W HCPCS: Performed by: INTERNAL MEDICINE

## 2024-01-06 PROCEDURE — 36415 COLL VENOUS BLD VENIPUNCTURE: CPT

## 2024-01-06 PROCEDURE — 85027 COMPLETE CBC AUTOMATED: CPT

## 2024-01-06 PROCEDURE — 82962 GLUCOSE BLOOD TEST: CPT

## 2024-01-06 PROCEDURE — 2700000000 HC OXYGEN THERAPY PER DAY

## 2024-01-06 PROCEDURE — 2580000003 HC RX 258: Performed by: INTERNAL MEDICINE

## 2024-01-06 PROCEDURE — 94640 AIRWAY INHALATION TREATMENT: CPT

## 2024-01-06 PROCEDURE — 6370000000 HC RX 637 (ALT 250 FOR IP): Performed by: INTERNAL MEDICINE

## 2024-01-06 PROCEDURE — 80048 BASIC METABOLIC PNL TOTAL CA: CPT

## 2024-01-06 PROCEDURE — 2060000000 HC ICU INTERMEDIATE R&B

## 2024-01-06 PROCEDURE — 99232 SBSQ HOSP IP/OBS MODERATE 35: CPT | Performed by: INTERNAL MEDICINE

## 2024-01-06 RX ADMIN — Medication 10 ML: at 08:33

## 2024-01-06 RX ADMIN — AZITHROMYCIN DIHYDRATE 500 MG: 250 TABLET ORAL at 08:30

## 2024-01-06 RX ADMIN — IPRATROPIUM BROMIDE AND ALBUTEROL SULFATE 1 DOSE: .5; 2.5 SOLUTION RESPIRATORY (INHALATION) at 06:56

## 2024-01-06 RX ADMIN — IPRATROPIUM BROMIDE AND ALBUTEROL SULFATE 1 DOSE: .5; 2.5 SOLUTION RESPIRATORY (INHALATION) at 22:01

## 2024-01-06 RX ADMIN — GUAIFENESIN AND DEXTROMETHORPHAN 5 ML: 100; 10 SYRUP ORAL at 20:50

## 2024-01-06 RX ADMIN — ENOXAPARIN SODIUM 30 MG: 100 INJECTION SUBCUTANEOUS at 20:50

## 2024-01-06 RX ADMIN — PREDNISONE 40 MG: 20 TABLET ORAL at 08:30

## 2024-01-06 RX ADMIN — BUDESONIDE INHALATION 500 MCG: 0.5 SUSPENSION RESPIRATORY (INHALATION) at 19:10

## 2024-01-06 RX ADMIN — WATER 1000 MG: 1 INJECTION INTRAMUSCULAR; INTRAVENOUS; SUBCUTANEOUS at 14:41

## 2024-01-06 RX ADMIN — ENOXAPARIN SODIUM 30 MG: 100 INJECTION SUBCUTANEOUS at 08:30

## 2024-01-06 RX ADMIN — SODIUM CHLORIDE, POTASSIUM CHLORIDE, SODIUM LACTATE AND CALCIUM CHLORIDE: 600; 310; 30; 20 INJECTION, SOLUTION INTRAVENOUS at 20:52

## 2024-01-06 RX ADMIN — BUDESONIDE INHALATION 500 MCG: 0.5 SUSPENSION RESPIRATORY (INHALATION) at 06:57

## 2024-01-06 RX ADMIN — IPRATROPIUM BROMIDE AND ALBUTEROL SULFATE 1 DOSE: .5; 2.5 SOLUTION RESPIRATORY (INHALATION) at 19:10

## 2024-01-06 ASSESSMENT — PAIN SCALES - GENERAL
PAINLEVEL_OUTOF10: 0
PAINLEVEL_OUTOF10: 0

## 2024-01-07 LAB
ANION GAP SERPL CALCULATED.3IONS-SCNC: 12 MMOL/L (ref 7–16)
BUN SERPL-MCNC: 17 MG/DL (ref 6–23)
CALCIUM SERPL-MCNC: 8.5 MG/DL (ref 8.6–10.2)
CHLORIDE SERPL-SCNC: 99 MMOL/L (ref 98–107)
CO2 SERPL-SCNC: 29 MMOL/L (ref 22–29)
CREAT SERPL-MCNC: 0.9 MG/DL (ref 0.5–1)
ERYTHROCYTE [DISTWIDTH] IN BLOOD BY AUTOMATED COUNT: 13.7 % (ref 11.5–15)
GFR SERPL CREATININE-BSD FRML MDRD: >60 ML/MIN/1.73M2
GLUCOSE BLD-MCNC: 153 MG/DL (ref 74–99)
GLUCOSE BLD-MCNC: 173 MG/DL (ref 74–99)
GLUCOSE BLD-MCNC: 201 MG/DL (ref 74–99)
GLUCOSE BLD-MCNC: 93 MG/DL (ref 74–99)
GLUCOSE SERPL-MCNC: 96 MG/DL (ref 74–99)
HCT VFR BLD AUTO: 38.1 % (ref 34–48)
HGB BLD-MCNC: 12.2 G/DL (ref 11.5–15.5)
MCH RBC QN AUTO: 30.8 PG (ref 26–35)
MCHC RBC AUTO-ENTMCNC: 32 G/DL (ref 32–34.5)
MCV RBC AUTO: 96.2 FL (ref 80–99.9)
MICROORGANISM SPEC CULT: NORMAL
MICROORGANISM SPEC CULT: NORMAL
PLATELET # BLD AUTO: 391 K/UL (ref 130–450)
PMV BLD AUTO: 10.6 FL (ref 7–12)
POTASSIUM SERPL-SCNC: 3 MMOL/L (ref 3.5–5)
RBC # BLD AUTO: 3.96 M/UL (ref 3.5–5.5)
SERVICE CMNT-IMP: NORMAL
SERVICE CMNT-IMP: NORMAL
SODIUM SERPL-SCNC: 140 MMOL/L (ref 132–146)
SPECIMEN DESCRIPTION: NORMAL
SPECIMEN DESCRIPTION: NORMAL
WBC OTHER # BLD: 19.4 K/UL (ref 4.5–11.5)

## 2024-01-07 PROCEDURE — 94640 AIRWAY INHALATION TREATMENT: CPT

## 2024-01-07 PROCEDURE — 6360000002 HC RX W HCPCS: Performed by: INTERNAL MEDICINE

## 2024-01-07 PROCEDURE — 80048 BASIC METABOLIC PNL TOTAL CA: CPT

## 2024-01-07 PROCEDURE — 6370000000 HC RX 637 (ALT 250 FOR IP): Performed by: INTERNAL MEDICINE

## 2024-01-07 PROCEDURE — 2580000003 HC RX 258: Performed by: INTERNAL MEDICINE

## 2024-01-07 PROCEDURE — 36415 COLL VENOUS BLD VENIPUNCTURE: CPT

## 2024-01-07 PROCEDURE — 6370000000 HC RX 637 (ALT 250 FOR IP)

## 2024-01-07 PROCEDURE — 99232 SBSQ HOSP IP/OBS MODERATE 35: CPT | Performed by: INTERNAL MEDICINE

## 2024-01-07 PROCEDURE — 2060000000 HC ICU INTERMEDIATE R&B

## 2024-01-07 PROCEDURE — 82962 GLUCOSE BLOOD TEST: CPT

## 2024-01-07 PROCEDURE — 85027 COMPLETE CBC AUTOMATED: CPT

## 2024-01-07 RX ORDER — POTASSIUM CHLORIDE 20 MEQ/1
40 TABLET, EXTENDED RELEASE ORAL ONCE
Status: COMPLETED | OUTPATIENT
Start: 2024-01-07 | End: 2024-01-07

## 2024-01-07 RX ORDER — BUDESONIDE AND FORMOTEROL FUMARATE DIHYDRATE 160; 4.5 UG/1; UG/1
2 AEROSOL RESPIRATORY (INHALATION)
Status: DISCONTINUED | OUTPATIENT
Start: 2024-01-07 | End: 2024-01-08 | Stop reason: HOSPADM

## 2024-01-07 RX ADMIN — POTASSIUM CHLORIDE 40 MEQ: 1500 TABLET, EXTENDED RELEASE ORAL at 12:02

## 2024-01-07 RX ADMIN — WATER 1000 MG: 1 INJECTION INTRAMUSCULAR; INTRAVENOUS; SUBCUTANEOUS at 13:52

## 2024-01-07 RX ADMIN — GUAIFENESIN AND DEXTROMETHORPHAN 5 ML: 100; 10 SYRUP ORAL at 09:34

## 2024-01-07 RX ADMIN — IPRATROPIUM BROMIDE AND ALBUTEROL SULFATE 1 DOSE: .5; 2.5 SOLUTION RESPIRATORY (INHALATION) at 06:43

## 2024-01-07 RX ADMIN — IPRATROPIUM BROMIDE AND ALBUTEROL 1 PUFF: 20; 100 SPRAY, METERED RESPIRATORY (INHALATION) at 22:47

## 2024-01-07 RX ADMIN — BUDESONIDE INHALATION 500 MCG: 0.5 SUSPENSION RESPIRATORY (INHALATION) at 06:43

## 2024-01-07 RX ADMIN — BUDESONIDE AND FORMOTEROL FUMARATE DIHYDRATE 2 PUFF: 160; 4.5 AEROSOL RESPIRATORY (INHALATION) at 17:59

## 2024-01-07 RX ADMIN — ENOXAPARIN SODIUM 30 MG: 100 INJECTION SUBCUTANEOUS at 09:34

## 2024-01-07 RX ADMIN — PREDNISONE 30 MG: 20 TABLET ORAL at 09:35

## 2024-01-07 RX ADMIN — IPRATROPIUM BROMIDE AND ALBUTEROL 1 PUFF: 20; 100 SPRAY, METERED RESPIRATORY (INHALATION) at 17:59

## 2024-01-07 RX ADMIN — ENOXAPARIN SODIUM 30 MG: 100 INJECTION SUBCUTANEOUS at 20:13

## 2024-01-07 RX ADMIN — IPRATROPIUM BROMIDE AND ALBUTEROL SULFATE 1 DOSE: .5; 2.5 SOLUTION RESPIRATORY (INHALATION) at 11:31

## 2024-01-07 RX ADMIN — Medication 10 ML: at 09:35

## 2024-01-07 ASSESSMENT — PAIN SCALES - GENERAL: PAINLEVEL_OUTOF10: 0

## 2024-01-07 NOTE — PLAN OF CARE
Problem: Discharge Planning  Goal: Discharge to home or other facility with appropriate resources  Outcome: Progressing     Problem: Safety - Adult  Goal: Free from fall injury  Outcome: Progressing     Problem: ABCDS Injury Assessment  Goal: Absence of physical injury  Outcome: Progressing     Problem: Pain  Goal: Verbalizes/displays adequate comfort level or baseline comfort level  Outcome: Progressing  Flowsheets (Taken 1/6/2024 1700 by Allegra Cruz RN)  Verbalizes/displays adequate comfort level or baseline comfort level: Encourage patient to monitor pain and request assistance

## 2024-01-08 VITALS
HEIGHT: 64 IN | DIASTOLIC BLOOD PRESSURE: 73 MMHG | WEIGHT: 247.14 LBS | RESPIRATION RATE: 18 BRPM | HEART RATE: 93 BPM | OXYGEN SATURATION: 93 % | SYSTOLIC BLOOD PRESSURE: 154 MMHG | TEMPERATURE: 98.5 F | BODY MASS INDEX: 42.19 KG/M2

## 2024-01-08 LAB
GLUCOSE BLD-MCNC: 124 MG/DL (ref 74–99)
GLUCOSE BLD-MCNC: 94 MG/DL (ref 74–99)

## 2024-01-08 PROCEDURE — 94640 AIRWAY INHALATION TREATMENT: CPT

## 2024-01-08 PROCEDURE — 6360000002 HC RX W HCPCS: Performed by: INTERNAL MEDICINE

## 2024-01-08 PROCEDURE — 6370000000 HC RX 637 (ALT 250 FOR IP): Performed by: INTERNAL MEDICINE

## 2024-01-08 PROCEDURE — 2580000003 HC RX 258: Performed by: INTERNAL MEDICINE

## 2024-01-08 PROCEDURE — 82962 GLUCOSE BLOOD TEST: CPT

## 2024-01-08 PROCEDURE — 6370000000 HC RX 637 (ALT 250 FOR IP)

## 2024-01-08 PROCEDURE — 6370000000 HC RX 637 (ALT 250 FOR IP): Performed by: NURSE PRACTITIONER

## 2024-01-08 RX ORDER — PREDNISONE 10 MG/1
TABLET ORAL
Qty: 18 TABLET | Refills: 0 | Status: SHIPPED | OUTPATIENT
Start: 2024-01-08 | End: 2024-01-17

## 2024-01-08 RX ORDER — LEVOFLOXACIN 750 MG/1
750 TABLET, FILM COATED ORAL DAILY
Qty: 5 TABLET | Refills: 0 | Status: SHIPPED | OUTPATIENT
Start: 2024-01-09 | End: 2024-01-14

## 2024-01-08 RX ORDER — BUDESONIDE AND FORMOTEROL FUMARATE DIHYDRATE 160; 4.5 UG/1; UG/1
2 AEROSOL RESPIRATORY (INHALATION)
Qty: 10.2 G | Refills: 1 | Status: SHIPPED | OUTPATIENT
Start: 2024-01-08

## 2024-01-08 RX ORDER — LEVOFLOXACIN 750 MG/1
750 TABLET, FILM COATED ORAL ONCE
Status: COMPLETED | OUTPATIENT
Start: 2024-01-08 | End: 2024-01-08

## 2024-01-08 RX ORDER — POTASSIUM CHLORIDE 750 MG/1
10 CAPSULE, EXTENDED RELEASE ORAL 2 TIMES DAILY
Qty: 14 CAPSULE | Refills: 0 | Status: SHIPPED | OUTPATIENT
Start: 2024-01-08 | End: 2024-01-15

## 2024-01-08 RX ORDER — GUAIFENESIN/DEXTROMETHORPHAN 100-10MG/5
10 SYRUP ORAL 4 TIMES DAILY PRN
Qty: 473 ML | Refills: 0 | Status: SHIPPED | OUTPATIENT
Start: 2024-01-08

## 2024-01-08 RX ADMIN — IPRATROPIUM BROMIDE AND ALBUTEROL 1 PUFF: 20; 100 SPRAY, METERED RESPIRATORY (INHALATION) at 04:42

## 2024-01-08 RX ADMIN — WATER 1000 MG: 1 INJECTION INTRAMUSCULAR; INTRAVENOUS; SUBCUTANEOUS at 14:36

## 2024-01-08 RX ADMIN — PREDNISONE 30 MG: 20 TABLET ORAL at 10:17

## 2024-01-08 RX ADMIN — LEVOFLOXACIN 750 MG: 750 TABLET, FILM COATED ORAL at 14:35

## 2024-01-08 RX ADMIN — IPRATROPIUM BROMIDE AND ALBUTEROL 1 PUFF: 20; 100 SPRAY, METERED RESPIRATORY (INHALATION) at 11:12

## 2024-01-08 RX ADMIN — ENOXAPARIN SODIUM 30 MG: 100 INJECTION SUBCUTANEOUS at 10:16

## 2024-01-08 RX ADMIN — BUDESONIDE AND FORMOTEROL FUMARATE DIHYDRATE 2 PUFF: 160; 4.5 AEROSOL RESPIRATORY (INHALATION) at 04:42

## 2024-01-08 RX ADMIN — POTASSIUM BICARBONATE 50 MEQ: 978 TABLET, EFFERVESCENT ORAL at 14:34

## 2024-01-08 RX ADMIN — GUAIFENESIN AND DEXTROMETHORPHAN 5 ML: 100; 10 SYRUP ORAL at 00:10

## 2024-01-08 RX ADMIN — GUAIFENESIN AND DEXTROMETHORPHAN 5 ML: 100; 10 SYRUP ORAL at 06:21

## 2024-01-08 ASSESSMENT — PAIN SCALES - GENERAL: PAINLEVEL_OUTOF10: 0

## 2024-01-08 NOTE — CARE COORDINATION
SOCIAL WORK / DISCHARGE PLANNING:  Pt to discharge home today. Room air, ambulating throughout room with no device. Pt is feeling much better. Meds to Beds to be obtained. Pt drove self to hospital, will drive self home. No other dc needs identified or requested at this time.           Electronically signed by MINAL Flores on 1/8/2024 at 2:39 PM

## 2024-01-08 NOTE — DISCHARGE SUMMARY
5 tablet, Refills: 0      guaiFENesin-dextromethorphan (ROBITUSSIN DM) 100-10 MG/5ML syrup Take 10 mLs by mouth 4 times daily as needed for Cough  Qty: 473 mL, Refills: 0                Details   potassium chloride (MICRO-K) 10 MEQ extended release capsule Take 1 capsule by mouth 2 times daily for 7 days  Qty: 14 capsule, Refills: 0                Details   Omega-3 Fatty Acids (FISH OIL) 1000 MG capsule Take 1 capsule by mouth daily      denosumab (PROLIA) 60 MG/ML SOSY SC injection Inject 1 mL into the skin every 6 months      senna (SENOKOT) 8.6 MG tablet Take 1 tablet by mouth daily as needed for Constipation      amLODIPine (NORVASC) 10 MG tablet Take 1 tablet by mouth daily      calcium carbonate 600 MG TABS tablet Take 1 tablet by mouth daily      Multiple Vitamins-Minerals (CENTRUM SILVER PO) Take 1 tablet by mouth daily             FOLLOW UP/INSTRUCTIONS:  This patient is instructed to follow-up with her primary care physician.  Patient is instructed to follow-up with the consults listed above as directed by them.  she is instructed to resume home medications and take new medications as indicated in the list above.  If the patient has a recurrence of symptoms, she is instructed to go to the ED.    Preparing for this patient's discharge, including paperwork, orders, instructions, and meeting with patient did require > 40 minutes.    WILLY Ordonez CNP     1/8/2024  2:21 PM

## 2024-01-10 NOTE — PROGRESS NOTES
4 Eyes Skin Assessment     NAME:  Marisela Guillaume  YOB: 1950  MEDICAL RECORD NUMBER:  [id number]    The patient is being assessed for  Admission    I agree that at least one RN has performed a thorough Head to Toe Skin Assessment on the patient. ALL assessment sites listed below have been assessed.      Areas assessed by both nurses:    Head, Face, Ears, Shoulders, Back, Chest, Arms, Elbows, Hands, Sacrum. Buttock, Coccyx, Ischium, and Legs. Feet and Heels        Does the Patient have a Wound? No noted wound(s)       Lang Prevention initiated by RN: No  Wound Care Orders initiated by RN: No    Pressure Injury (Stage 3,4, Unstageable, DTI, NWPT, and Complex wounds) if present, place Wound referral order by RN under : No    New Ostomies, if present place, Ostomy referral order under : No     Nurse 1 eSignature: Electronically signed by Kanu Britt RN on 1/2/24 at 9:15 PM EST    **SHARE this note so that the co-signing nurse can place an eSignature**    Nurse 2 eSignature: Electronically signed by Shaneka Haywood RN on 1/2/24 at 9:30 PM EST    
Assessment and Plan  Patient is a 73 y.o. female with the following medical Problems:   Acute hypoxic respiratory failure requiring supplemental oxygen  Left lung pneumonia  RSV pneumonia.  Morbid obesity  Suspected obstructive sleep apnea  CKD stage III  Chronic back pain  Depression    Plan of care:   ceftriaxone and azithromycin to complete 7 days course of ceftriaxone and 5 days course of azithromycin.  Patient can be discharged on 5 days of renally dose Levaquin.  Pneumonia workup is negative to date except for RSVP  DVT prophylaxis  Scheduled DuoNeb and scheduled Pulmicort  Incentive spirometer  Wean prednisone slowly over the span of 2 weeks.  We will sign off.  Please call with      History of Present Illness:   Patient is a 73-year-old -American woman with above-mentioned medical problems who was admitted on January 2, 2023 with progressive shortness of breath and cough.  Patient was initially on 4 L nasal cannula.  She denies fever.  CTA ruled out PE but showed left lung pneumonia.    Daily progress:  January 4, 2024: Patient continues to require supplemental oxygen and continues to have significant wheezing.  She has no fever, chills, rigors.  She continues to endorse exertional dyspnea and cough.    January 5, 2024: Patient continues to be on supplemental oxygen 3 L/min.  She continues to have exertional and conversational dyspnea however she feels that her shortness of breath has improved.  She denies chest pain, chills, fever, or rigors.  Wheezing has improved.    January 6, 2024: Patient's oxygen requirement has been weaned down to 1 L/min.  She has no fever, chills, rigors.  Blood cells count continues to improve and currently down to 23.1.  Patient is currently on 30 mg of prednisone.  Decreased endorse mild exertional dyspnea.    January 7 2024: Patient has been weaned off supplemental oxygen.  She has no fever, chills, or rigors.  Overall she is feeling much better.  White blood cell count 
Assessment and Plan  Patient is a 73 y.o. female with the following medical Problems:   Acute hypoxic respiratory failure requiring supplemental oxygen  Left lung pneumonia  RSV pneumonia.  Morbid obesity  Suspected obstructive sleep apnea  CKD stage III  Chronic back pain  Depression    Plan of care:   ceftriaxone and azithromycin to complete 7 days course of ceftriaxone and 5 days course of azithromycin.  Pneumonia workup is negative to date except for RSVP  DVT prophylaxis  Scheduled DuoNeb and scheduled Pulmicort  Incentive spirometer  Wean prednisone slowly over the span of 2 weeks.      History of Present Illness:   Patient is a 73-year-old -American woman with above-mentioned medical problems who was admitted on January 2, 2023 with progressive shortness of breath and cough.  Patient was initially on 4 L nasal cannula.  She denies fever.  CTA ruled out PE but showed left lung pneumonia.    Daily progress:  January 4, 2024: Patient continues to require supplemental oxygen and continues to have significant wheezing.  She has no fever, chills, rigors.  She continues to endorse exertional dyspnea and cough.    January 5, 2024: Patient continues to be on supplemental oxygen 3 L/min.  She continues to have exertional and conversational dyspnea however she feels that her shortness of breath has improved.  She denies chest pain, chills, fever, or rigors.  Wheezing has improved.    January 6, 2024: Patient's oxygen requirement has been weaned down to 1 L/min.  She has no fever, chills, rigors.  Blood cells count continues to improve and currently down to 23.1.  Patient is currently on 30 mg of prednisone.  Decreased endorse mild exertional dyspnea.      Past Medical History:  Past Medical History:   Diagnosis Date    Hypertension     PONV (postoperative nausea and vomiting)       Past Surgical History:   Procedure Laterality Date    ANKLE FRACTURE SURGERY Left 7/20/2022    LEFT ANKLE EXTERNAL FIXATOR performed 
CLINICAL PHARMACY NOTE: MEDS TO BEDS    Total # of Prescriptions Filled: 4   The following medications were delivered to the patient:  Levofloxacin 750 mg  Potassium Chloride ER 10 meQ  Dextromethorphan-Guaifen  syrp  Prednisone 10 mg    Additional Documentation:    The Symbicort and Combivent were covered but had very high copays. Patient requested them to be sent to [facility] pharmacy in [place] because her copays are usually cheaper there.  
Called report to 6th floor. Shaneka, abiola, would not take report said I had to speak with the nurse, ronald. Placed on hold for 20+ min. Tried to call back 2x and no one answered. Printed a report and transferred patient.   Charge refused to take report, gave report to Gopal who was happy to help. Shaneka walked into the room and attempted to prevent Gopal from taking report. Gopal stated \"I'm helping ronald out\" Charge replied \"oh this is how we are going to do it now.\"  
Called security about pts vehicle upon request explained she was admitted to hospital pt was afraid she would get towed  
Internal Medicine Progress Note    [facility]    Chacorta Laird D.O., SAMI.                    Nick Yusuf D.O., BILL Valerio D.O.       Desiree Melara, MSN, APRN, NP-C  Derke Johnson, MSN, APRN-CNP  Bala Hankins, MSN, APRN-CNP     Primary Care Physician: Juliano Nelson MD   Admitting Physician:  Juliano Nelson MD  Admission date and time: 1/2/2024  1:02 AM    Room:  [address] [place] [address]  Admitting diagnosis: Acute respiratory failure (HCC) [J96.00]  Acute respiratory failure with hypoxia (HCC) [J96.01]  Pneumonia of left lower lobe due to infectious organism [J18.9]    Patient Name: Marisela Guillaume  MRN: [id number]    Date of Service: 1/5/2024     Subjective:  Marisela is a 73 y.o. female who was seen and examined today,1/5/2024, at the bedside.  She is currently sitting up in the chair and continues to require the use of 3 L oxygen by nasal cannula.  She continues to report shortness of breath which is noted during conversation.  She is encouraged to use her incentive spirometer and ambulate in her room.  Dr. Laird and Dr. Yusuf is currently providing coverage for Dr. Nelson.    No family present during my examination.    Review of System:   Constitutional:   Denies fever or chills, weight loss or gain, positive fatigue.  HEENT:   Denies ear pain, sore throat, sinus or eye problems.  Cardiovascular:   Denies any chest pain, irregular heartbeats, or palpitations.   Respiratory:   Denies  coughing, sputum production, hemoptysis, or wheezing.  Positive shortness of breath  Gastrointestinal:   Denies nausea, vomiting, diarrhea, or constipation.  Denies any abdominal pain.  Genitourinary:    Denies any urgency, frequency, hematuria. Voiding  without difficulty.  Extremities:   Denies lower extremity swelling, edema or cyanosis.   Neurology:    Denies any headache or focal neurological deficits, Denies generalized weakness or memory difficulty.   Psch: 
Internal Medicine Progress Note    [facility]    Chacorta Laird D.O., SAMI.                    Nick Yusuf D.O., BLIL Valerio D.O.       Desiree Melara, MSN, APRN, NP-C  Derek Johnson, MSN, APRN-CNP  Bala Hankins, MSN, APRN-CNP     Primary Care Physician: Juliano Nelson MD   Admitting Physician:  Juliano Nelson MD  Admission date and time: 1/2/2024  1:02 AM    Room:  [address] [place] [address]  Admitting diagnosis: Acute respiratory failure (HCC) [J96.00]  Acute respiratory failure with hypoxia (HCC) [J96.01]  Pneumonia of left lower lobe due to infectious organism [J18.9]    Patient Name: Marisela Guillaume  MRN: [id number]    Date of Service: 1/7/2024     Subjective:  Marisela is a 73 y.o. female who was seen and examined today,1/7/2024, at the bedside.  Patient sitting up in chair and remains off her oxygen.  She reports mild shortness of breath with prolonged exertion.  Her leukocytosis is trending downward since the reduction in the steroid dose.  She continues to improve she ready for discharge tomorrow.  No family present during my examination.    Review of System:   Constitutional:   Denies fever or chills, weight loss or gain, positive fatigue.  HEENT:   Denies ear pain, sore throat, sinus or eye problems.  Cardiovascular:   Denies any chest pain, irregular heartbeats, or palpitations.   Respiratory:   Denies  coughing, sputum production, hemoptysis, or wheezing.  Positive shortness of breath with exertion  Gastrointestinal:   Denies nausea, vomiting, diarrhea, or constipation.  Denies any abdominal pain.  Genitourinary:    Denies any urgency, frequency, hematuria. Voiding  without difficulty.  Extremities:   Denies lower extremity swelling, edema or cyanosis.   Neurology:    Denies any headache or focal neurological deficits, Denies generalized weakness or memory difficulty.   Psch:   Denies being anxious or depressed.  Musculoskeletal:    Denies  
OT SESSION ATTEMPT     Date:1/3/2024  Patient Name: Marisela Guillaume  MRN: [id number]  : 1950  Room: [address] [place] [address]     Attempted OT session this date:    [] unavailable due to other medical staff currently with pt   [] on hold, await MRI/ neurosurgical recommendations.   [] on hold per nursing staff secondary to lab / radiology results    [] Pt declined Occupational Therapy  this date.  Benefits of participation in therapy reviewed with pt.    [x] off unit   [] Other:     Will reattempt OT eval at a later time.    Shelly Davis, OTR/L [id number]    
Patient developed increased SOB and work of breathing. Her O2 sat dropped to 88% on 2L. Increased to 4L. Patient also had increased wheezing in her lungs. Respiratory notified for breathing treatment and Dr. Nelson notified of current change of status. Orders placed in Epic. Patient currently stable, will continue to monitor.   
Pharmacy Consultation Note  (Antibiotic Dosing and Monitoring)    Initial consult date: 1/2/24  Consulting physician/provider: Dr Nelson  Drug: Vancomycin  Indication: Pneumonia    Age/  Gender Height Weight IBW  Allergy Information   73 y.o./female 5'3\" 104 kg    Ideal body weight: 54.7 kg (120 lb 9.5 oz)  Adjusted ideal body weight: 74.7 kg (164 lb 9.5 oz)   Codeine      Renal Function:  Recent Labs     01/02/24  0130 01/03/24  0440   BUN 17 20   CREATININE 1.1* 1.0         Intake/Output Summary (Last 24 hours) at 1/3/2024 1221  Last data filed at 1/3/2024 0644  Gross per 24 hour   Intake --   Output 625 ml   Net -625 ml       Vancomycin Monitoring:  Trough:  No results for input(s): \"VANCOTROUGH\" in the last 72 hours.  Random:  No results for input(s): \"VANCORANDOM\" in the last 72 hours.      Vancomycin Administration Times:  Recent vancomycin administrations                     vancomycin (VANCOCIN) 750 mg in sodium chloride 0.9 % 250 mL IVPB (mg) 750 mg New Bag 01/03/24 1226     750 mg New Bag  0137                  ssessment:  Patient is a 73 y.o. female who has been initiated on vancomycin  Estimated Creatinine Clearance: 59 mL/min (based on SCr of 1 mg/dL).  To dose vancomycin, pharmacy will be targeting an AUC of 400-600    Plan:  Vancomycin 750 mg IV every 12 hours  Trough tonight @ 0030, Hold dose if trough is >20 mcg/mL.  MRSA nares ordered  Will continue to monitor renal function   Pharmacy to follow      Kelley Garg PharmD, BCPS 1/3/2024 12:21 PM   [phone]   
Pharmacy Consultation Note  (Antibiotic Dosing and Monitoring)    Initial consult date: 1/2/24  Consulting physician/provider: Dr Nelson  Drug: Vancomycin  Indication: Pneumonia    Age/  Gender Height Weight IBW  Allergy Information   73 y.o./female 5'3\" 104 kg    Ideal body weight: 54.7 kg (120 lb 9.5 oz)  Adjusted ideal body weight: 74.8 kg (164 lb 15.2 oz)   Codeine      Renal Function:  Recent Labs     01/02/24  0130 01/03/24  0440   BUN 17 20   CREATININE 1.1* 1.0         Intake/Output Summary (Last 24 hours) at 1/4/2024 0837  Last data filed at 1/4/2024 0558  Gross per 24 hour   Intake --   Output 1950 ml   Net -1950 ml         Vancomycin Monitoring:  Trough:  No results for input(s): \"VANCOTROUGH\" in the last 72 hours.  Random:  No results for input(s): \"VANCORANDOM\" in the last 72 hours.      Vancomycin Administration Times:  Recent vancomycin administrations                     vancomycin (VANCOCIN) 750 mg in sodium chloride 0.9 % 250 mL IVPB (mg) 750 mg New Bag 01/04/24 0024     750 mg New Bag 01/03/24 1226     750 mg New Bag  0137               ssessment:  Patient is a 73 y.o. female who has been initiated on vancomycin  Estimated Creatinine Clearance: 59 mL/min (based on SCr of 1 mg/dL).  To dose vancomycin, pharmacy will be targeting an AUC of 400-600  1/4/24: Trough @ 0030 not collected; reason not documented    Plan:  Vancomycin 750 mg IV every 12 hours  Random level tomorrow @ 1000  MRSA nares in process  Will continue to monitor renal function   Pharmacy to follow      Daniel Green, PharmD 1/4/2024 8:38 AM   [phone]  
Pharmacy Consultation Note  (Antibiotic Dosing and Monitoring)    Initial consult date: 1/2/24  Consulting physician/provider: Dr Nelson  Drug: Vancomycin  Indication: Pneumonia    Age/  Gender Height Weight IBW  Allergy Information   73 y.o./female 5'3\" 104 kg    Ideal body weight: 54.7 kg (120 lb 9.5 oz)  Adjusted ideal body weight: 75.5 kg (166 lb 5.7 oz)   Codeine      Renal Function:  Recent Labs     01/03/24  0440 01/05/24  0818   BUN 20 19   CREATININE 1.0 0.9         Intake/Output Summary (Last 24 hours) at 1/5/2024 1244  Last data filed at 1/5/2024 1052  Gross per 24 hour   Intake 805.25 ml   Output 2300 ml   Net -1494.75 ml         Vancomycin Monitoring:  Trough:  No results for input(s): \"VANCOTROUGH\" in the last 72 hours.  Random:    Recent Labs     01/05/24  0818   VANCORANDOM 11.2       Vancomycin Administration Times:  Recent vancomycin administrations                     vancomycin (VANCOCIN) 750 mg in sodium chloride 0.9 % 250 mL IVPB (mg) 750 mg New Bag 01/05/24 0132     750 mg New Bag 01/04/24 1245     750 mg New Bag  0024     750 mg New Bag 01/03/24 1226     750 mg New Bag  0137               ssessment:  Patient is a 73 y.o. female who has been initiated on vancomycin  Estimated Creatinine Clearance: 66 mL/min (based on SCr of 0.9 mg/dL).  To dose vancomycin, pharmacy will be targeting an AUC of 400-600  1/4/24: Trough @ 0030 not collected; reason not documented  1/5: Random @ 0818 = 11.2 mcg/mL; AUC < 400    Plan:  RSV positive with MRSA nares negative - recommend consider discontinuation of vancomycin therapy  Vancomycin 1000 mg IV every 12 hours  Will continue to monitor renal function   Pharmacy to follow      Daniel Green, PharmD 1/5/2024 12:44 PM   [phone]    Addendum    Vancomycin has been discontinued; pharmacy will sign-off.  Please reconsult if needed.    Thank you,  Fatou Urbano, PharmD, BCPS 1/5/2024 1:39 PM   Ext: [phone]    
Pharmacy Consultation Note  (Antibiotic Dosing and Monitoring)    Initial consult date: 1/2/24  Consulting physician/provider: Dr Nelson  Drug: Vancomycin  Indication: Pneumonia    Age/  Gender Height Weight IBW  Allergy Information   73 y.o./female 5'3\" 104 kg    Patient weight not recorded   Codeine      Renal Function:  Recent Labs     01/02/24  0130   BUN 17   CREATININE 1.1*     No intake or output data in the 24 hours ending 01/02/24 1202    Vancomycin Monitoring:  Trough:  No results for input(s): \"VANCOTROUGH\" in the last 72 hours.  Random:  No results for input(s): \"VANCORANDOM\" in the last 72 hours.      Vancomycin Administration Times:  Recent vancomycin administrations        No vancomycin IV orders with administrations found.               Assessment:  Patient is a 73 y.o. female who has been initiated on vancomycin  CrCl cannot be calculated (Unknown ideal weight.).  To dose vancomycin, pharmacy will be targeting an AUC of 400-600    Plan:  Vancomycin 750 mg IV every 12 hours  Check levels when appropriate  MRSA nares ordered  Will continue to monitor renal function   Pharmacy to follow      Daniel Green, PharmD 1/2/2024 12:06 PM   [phone]  
Physical Therapy Initial Evaluation/Plan of Care    Room #:  0639/0639-01  Patient Name: Marisela Guillaume  YOB: 1950  MRN: [id number]    Date of Service: 1/4/2024     Tentative placement recommendation: Home with Home Health Physical Therapy  Equipment recommendation: To be determined      Evaluating Physical Therapist: Deny Smith, PT #[id number]      Specific Provider Orders/Date/Referring Provider :   01/02/24 0815    PT evaluation and treat  Start:  01/02/24 0815,   End:  01/02/24 0815,   ONE TIME,   Standing Count:  1 Occurrences,   R       Juliano Nelson MD    Admitting Diagnosis:   Acute respiratory failure (HCC) [J96.00]  Acute respiratory failure with hypoxia (HCC) [J96.01]  Pneumonia of left lower lobe due to infectious organism [J18.9]      Surgery: none  Visit Diagnoses         Codes    Pneumonia of left lower lobe due to infectious organism     J18.9            Patient Active Problem List   Diagnosis    Closed bimalleolar fracture of left ankle    Tibia/fibula fracture    Left trimalleolar fracture, closed, initial encounter    Age-related osteoporosis without current pathological fracture    Acute respiratory failure with hypoxia (HCC)    Acute respiratory failure (HCC)        ASSESSMENT of Current Deficits Patient exhibits decreased strength, balance, and endurance impairing functional mobility, transfers, gait distance, and tolerance to activity dyspnea on exertion . Pt on 3L but no home O2. Spo2 reading 90-95% on 3L during and after ambulation. The patient will benefit from continued skilled therapy to increase strength and improve balance for safe functional mobility, to decrease risk of falls, and to meet goals at discharge.        PHYSICAL THERAPY  PLAN OF CARE       Physical therapy plan of care is established based on physician order,  patient diagnosis and clinical assessment    Current Treatment Recommendations:    -Bed Mobility: Lower extremity exercises , Upper extremity exercises 
Renal Dose Adjustment Policy (Generic)     This patient is on medication that requires renal, weight, and/or indication dose adjustment.      Date Body Weight IBW  Adjusted BW SCr  CrCl Dialysis status   1/2/2024 104 kg (229 lb 4.5 oz) Ideal body weight: 52.4 kg (115 lb 8.3 oz)  Adjusted ideal body weight: 73 kg (161 lb 0.4 oz) Serum creatinine: 1.1 mg/dL (H) 01/02/24 0130  Estimated creatinine clearance: 52 mL/min (A) N/a       Pharmacy has dose-adjusted the following medication(s):    Date Previous Order Adjusted Order   1/2/2024 Enoxaparin 40 mg daily 30 mg twice daily        These changes were made per protocol according to the [facility]   Automatic Renal Dose Adjustment Policy.     *Please note this dose may need readjusted if patient's condition changes.    Please contact pharmacy with any questions regarding these changes.    Peral Reyes RPH  1/2/2024  12:17 PM   
  I/O last 3 completed shifts:  In: -   Out: 625 [Urine:625]  Patient Vitals for the past 96 hrs (Last 3 readings):   Weight   01/03/24 1444 104.3 kg (230 lb)   01/02/24 2115 104.6 kg (230 lb 9.6 oz)   01/02/24 1200 104 kg (229 lb 4.5 oz)     Vital Signs:   Blood pressure 127/60, pulse (!) 109, temperature 98 °F (36.7 °C), resp. rate 20, height 1.626 m (5' 4\"), weight 104.3 kg (230 lb), SpO2 94 %.    General appearance:  Alert, responsive, oriented to person, place, and time. Well preserved, alert, no distress.  Head:  Normocephalic. No masses, lesions or tenderness.  Eyes:  PERRLA.  EOMI.  Sclera clear.  Buccal mucosa moist.  ENT:  Ears normal. Mucosa normal.  Neck:    Supple. Trachea midline. No thyromegaly. No JVD. No bruits.  Heart:    Rhythm regular. Rate controlled.  No murmurs.  Lungs: Scattered rhonchi and wheezing noted bilaterally  Symmetrical.   Abdomen:   Soft. Non-tender. Non-distended. Bowel sounds positive. No organomegaly or masses.  No pain on palpation.  Extremities:    Peripheral pulses present.  No peripheral edema.  No ulcers. No cyanosis. No clubbing.  Neurologic:    Alert x 3.  No focal deficit.  Cranial nerves grossly intact. No focal weakness.  Psych:   Behavior is normal. Mood appears normal. Speech is not rapid and/or pressured.  Musculoskeletal:   Spine ROM normal. Muscular strength intact. Gait not assessed.  Integumentary:  No rashes  Skin normal color and texture.  Genitalia/Breast:  Deferred    Medication:  Scheduled Meds:   ipratropium 0.5 mg-albuterol 2.5 mg  1 Dose Inhalation Q6H    insulin lispro  0-4 Units SubCUTAneous TID WC    insulin lispro  0-4 Units SubCUTAneous Nightly    methylPREDNISolone  125 mg IntraVENous Q6H    sodium chloride flush  5-40 mL IntraVENous 2 times per day    albuterol  2.5 mg Nebulization Q4H WA RT    cefTRIAXone (ROCEPHIN) IV  1,000 mg IntraVENous Q24H    And    azithromycin  500 mg Oral Q24H    vancomycin  750 mg IntraVENous Q12H    enoxaparin  30 mg 
and tubes intact.  Overall patient demonstrated decreased independence and safety during completion of ADL/functional transfer/mobility tasks.  Pt would benefit from continued skilled OT to increase safety and independence with completion of ADL/IADL tasks for functional independence and quality of life.    Treatment: OT treatment provided this date includes:   Instruction/training on safety and adapted techniques for completion of ADLs   Instruction/training on safe functional mobility/transfer techniques   Instruction/training on energy conservation/work simplification for completion of ADLs   Instruction/training on proper positioning/alignment to prevent contractures     Treatment:      Functional transfers: Facilitated transfers to/from chair, EOB and toilet with cues for body alignment, safety and hand placement.  ADL completion: Self-care retraining for the above-mentioned ADLs; training on proper hand placement, safety technique, sequencing, and energy conservation techniques.  Postural Balance: Sitting/standing balance retraining to improve righting reactions with postural changes during ADLs.      Rehab Potential: Good for established goals.      Patient / Family Goal: Patient would like to return home and return to [facility] re: ADLs and IADLs        Patient and/or family were instructed on functional diagnosis, prognosis/goals and OT plan of care. Demonstrated good understanding.     Eval Complexity: Low  History: Brief review of medical records and additional review of physical, cognitive, or psychosocial history related to current functional performance  Exam: 3+ performance deficits  Assistance/Modification: Mod assistance or modifications required to perform tasks. May have comorbidities that affect occupational performance.    Time In: 10:18 AM   Time Out: 10:45 AM    Total Treatment Time: 12      Min Units   OT Eval Low 97165  X  1    OT Eval Medium 50695      OT Eval High 41943      OT Re-Eval 85785    
face to face contact.  This time was spent reviewing notes and laboratory data as well as instructing and counseling the patient. Time I spent with the family or surrogate(s) is included only if the patient was incapable of providing the necessary information or participating in medical decisions. I also discussed the differential diagnosis and all of the proposed management plans with the patient and individuals accompanying the patient. I am readily available for any further decision-making and intervention.       Electronically signed by Juliano Nelson MD on 1/4/2024 at 11:08 PM       
suppository 650 mg, 650 mg, Rectal, Q6H PRN, Juliano Nelson MD    guaiFENesin-dextromethorphan (ROBITUSSIN DM) 100-10 MG/5ML syrup 5 mL, 5 mL, Oral, Q4H PRN, Juliano Nelson MD, 5 mL at 01/03/24 1617    enoxaparin Sodium (LOVENOX) injection 30 mg, 30 mg, SubCUTAneous, BID, Juliano Nelson MD, 30 mg at 01/05/24 1022      Review of Systems:   General: denies weight gain, denies loss of appetite, fever, chills, night sweats.  HEENT: denies headaches, dizziness, head trauma, visual changes, eye pain, tinnitus, nosebleeds, hoarseness or throat pain    Respiratory: denies chest pain,+ve dyspnea, cough but no hemoptysis  Cardiovascular: denies orthopnea, paroxysmal nocturnal dyspnea, leg swelling, and previous heart attack.    Gastrointestinal: denies pain, nausea vomiting, diarrhea, constipation, melena or bleeding.  Genitourinary: denies hematuria, frequency, urgency or dysuria  Neurology: denies syncope, seizures, paralysis, paraesthesia   Endocrine: denies polyuria, polydipsia, skin or hair changes, and heat or cold intolerance  Musculoskeletal: denies joint pain, swelling, arthritis or myalgia  Hematologic: denies bleeding, adenopathy and easy bruising  Skin: denies rashes and skin discoloration  Psychiatry: denies depression    Physical Exam:   Vital Signs:  BP (!) 168/78   Pulse 74   Temp 98.1 °F (36.7 °C) (Oral)   Resp 16   Ht 1.626 m (5' 4\")   Wt 106.6 kg (235 lb)   SpO2 94%   BMI 40.34 kg/m²     Input/Output:  In: 3372.5 [P.O.:600; I.V.:1658.1]  Out: 2300     Oxygen requirements: NC      General appearance: ill looking, not in pain but in mild respiratory distress    HEENT: Atraumatic/normocephalic, EOMI, LUCIA, pharynx clear, moist mucosa, redness of the uvula appreciated,   Neck: Supple, no jugular venous distension, lymphadenopathy, thyromegaly or carotid bruits  Chest: Decreased breath sounds,+ve wheezing, +ve crackles and no tenderness over ribs   Cardiovascular: Normal S1 , S2, regular rate and rhythm, 
surrogate(s) is included only if the patient was incapable of providing the necessary information or participating in medical decisions. I also discussed the differential diagnosis and all of the proposed management plans with the patient and individuals accompanying the patient.    The patient was seen, examined and then discussed with Dr. Laird.     WILLY Pinto - CNP  1/6/2024  2:25 PM          I saw and evaluated the patient. I agree with the findings and the plan of care as documented in Bala AGUILERA-CNP note.    Chacorta Laird DO, FACOI  3:08 PM  1/6/2024        
imaging and cardiac work up were reviewed        STAFF PHYSICIAN NOTE OF PERSONAL INVOLVEMENT IN CARE  As the attending physician, I certify that I personally reviewed the patient's history and personally examined the patient to confirm the physical findings described above, and that I reviewed the relevant imaging studies and available reports.  I also discussed the differential diagnosis and all of the proposed management plans with the patient and individuals accompanying the patient to this visit.  They had the opportunity to ask questions about the proposed management plans and to have those questions answered.      Electronically signed by Jazmine Pascal MD on 1/4/2024 at 1:44 PM

## 2024-02-07 DIAGNOSIS — S82.842D CLOSED BIMALLEOLAR FRACTURE OF LEFT ANKLE WITH ROUTINE HEALING, SUBSEQUENT ENCOUNTER: Primary | ICD-10-CM

## 2024-02-12 ENCOUNTER — OFFICE VISIT (OUTPATIENT)
Dept: ORTHOPEDIC SURGERY | Age: 74
End: 2024-02-12
Payer: MEDICARE

## 2024-02-12 VITALS — TEMPERATURE: 98 F | WEIGHT: 229 LBS | HEIGHT: 64 IN | BODY MASS INDEX: 39.09 KG/M2

## 2024-02-12 DIAGNOSIS — S82.842D CLOSED BIMALLEOLAR FRACTURE OF LEFT ANKLE WITH ROUTINE HEALING, SUBSEQUENT ENCOUNTER: Primary | ICD-10-CM

## 2024-02-12 DIAGNOSIS — M76.829 POSTERIOR TIBIAL TENDON DYSFUNCTION: ICD-10-CM

## 2024-02-12 PROCEDURE — 3017F COLORECTAL CA SCREEN DOC REV: CPT | Performed by: ORTHOPAEDIC SURGERY

## 2024-02-12 PROCEDURE — 1090F PRES/ABSN URINE INCON ASSESS: CPT | Performed by: ORTHOPAEDIC SURGERY

## 2024-02-12 PROCEDURE — 1036F TOBACCO NON-USER: CPT | Performed by: ORTHOPAEDIC SURGERY

## 2024-02-12 PROCEDURE — 1123F ACP DISCUSS/DSCN MKR DOCD: CPT | Performed by: ORTHOPAEDIC SURGERY

## 2024-02-12 PROCEDURE — G8399 PT W/DXA RESULTS DOCUMENT: HCPCS | Performed by: ORTHOPAEDIC SURGERY

## 2024-02-12 PROCEDURE — G8484 FLU IMMUNIZE NO ADMIN: HCPCS | Performed by: ORTHOPAEDIC SURGERY

## 2024-02-12 PROCEDURE — G8417 CALC BMI ABV UP PARAM F/U: HCPCS | Performed by: ORTHOPAEDIC SURGERY

## 2024-02-12 PROCEDURE — G8427 DOCREV CUR MEDS BY ELIG CLIN: HCPCS | Performed by: ORTHOPAEDIC SURGERY

## 2024-02-12 PROCEDURE — 99213 OFFICE O/P EST LOW 20 MIN: CPT | Performed by: ORTHOPAEDIC SURGERY

## 2024-02-12 NOTE — PROGRESS NOTES
Chief Complaint   Patient presents with    Ankle Pain     Left ankle ORIF f/u previous Dr Díaz patient. Pain is on the lateral side and some on the medial side around the incision.        Marisela Guillaume returns today for follow-up of her left ankle ORIF 7/25/23.  She is having pain over her medial ankle.  Past Medical History:   Diagnosis Date    Hypertension     PONV (postoperative nausea and vomiting)      Past Surgical History:   Procedure Laterality Date    ANKLE FRACTURE SURGERY Left 7/20/2022    LEFT ANKLE EXTERNAL FIXATOR performed by Shamar Díaz DO at [facility] OR    ANKLE FRACTURE SURGERY Left 7/25/2022    LEFT ANKLE OPEN REDUCTION INTERNAL FIXATION **DO NOT CHANGE TIME** performed by Shamar Díaz DO at [facility] OR    HYSTERECTOMY (CERVIX STATUS UNKNOWN)         Current Outpatient Medications:     budesonide-formoterol (SYMBICORT) 160-4.5 MCG/ACT AERO, Inhale 2 puffs into the lungs in the morning and 2 puffs in the evening. .  Rinse mouth and spit after each use.., Disp: 10.2 g, Rfl: 1    albuterol-ipratropium (COMBIVENT RESPIMAT)  MCG/ACT AERS inhaler, Inhale 1 puff into the lungs every 4 hours as needed for Wheezing, Disp: 4 g, Rfl: 1    guaiFENesin-dextromethorphan (ROBITUSSIN DM) 100-10 MG/5ML syrup, Take 10 mLs by mouth 4 times daily as needed for Cough, Disp: 473 mL, Rfl: 0    Omega-3 Fatty Acids (FISH OIL) 1000 MG capsule, Take 1 capsule by mouth daily, Disp: , Rfl:     denosumab (PROLIA) 60 MG/ML SOSY SC injection, Inject 1 mL into the skin every 6 months, Disp: , Rfl:     senna (SENOKOT) 8.6 MG tablet, Take 1 tablet by mouth daily as needed for Constipation, Disp: , Rfl:     amLODIPine (NORVASC) 10 MG tablet, Take 1 tablet by mouth daily, Disp: , Rfl:     calcium carbonate 600 MG TABS tablet, Take 1 tablet by mouth daily, Disp: , Rfl:     Multiple Vitamins-Minerals (CENTRUM SILVER PO), Take 1 tablet by mouth daily, Disp: , Rfl:     potassium chloride (MICRO-K) 10 MEQ extended release capsule, Take 1

## 2024-02-22 ENCOUNTER — OFFICE VISIT (OUTPATIENT)
Dept: PODIATRY | Age: 74
End: 2024-02-22
Payer: MEDICARE

## 2024-02-22 VITALS — WEIGHT: 229 LBS | HEIGHT: 65 IN | BODY MASS INDEX: 38.15 KG/M2

## 2024-02-22 DIAGNOSIS — M19.172 POST-TRAUMATIC ARTHRITIS OF ANKLE, LEFT: Primary | ICD-10-CM

## 2024-02-22 DIAGNOSIS — M76.822 POSTERIOR TIBIAL TENDONITIS, LEFT: ICD-10-CM

## 2024-02-22 DIAGNOSIS — M25.572 PAIN IN LEFT ANKLE AND JOINTS OF LEFT FOOT: ICD-10-CM

## 2024-02-22 DIAGNOSIS — R26.2 DIFFICULTY WALKING: ICD-10-CM

## 2024-02-22 DIAGNOSIS — R60.0 LOCALIZED EDEMA: ICD-10-CM

## 2024-02-22 PROCEDURE — 3017F COLORECTAL CA SCREEN DOC REV: CPT | Performed by: PODIATRIST

## 2024-02-22 PROCEDURE — 29580 STRAPPING UNNA BOOT: CPT | Performed by: PODIATRIST

## 2024-02-22 PROCEDURE — G8417 CALC BMI ABV UP PARAM F/U: HCPCS | Performed by: PODIATRIST

## 2024-02-22 PROCEDURE — 1123F ACP DISCUSS/DSCN MKR DOCD: CPT | Performed by: PODIATRIST

## 2024-02-22 PROCEDURE — G8427 DOCREV CUR MEDS BY ELIG CLIN: HCPCS | Performed by: PODIATRIST

## 2024-02-22 PROCEDURE — G8399 PT W/DXA RESULTS DOCUMENT: HCPCS | Performed by: PODIATRIST

## 2024-02-22 PROCEDURE — 1090F PRES/ABSN URINE INCON ASSESS: CPT | Performed by: PODIATRIST

## 2024-02-22 PROCEDURE — G8484 FLU IMMUNIZE NO ADMIN: HCPCS | Performed by: PODIATRIST

## 2024-02-22 PROCEDURE — 99203 OFFICE O/P NEW LOW 30 MIN: CPT | Performed by: PODIATRIST

## 2024-02-22 PROCEDURE — 1036F TOBACCO NON-USER: CPT | Performed by: PODIATRIST

## 2024-02-22 NOTE — PROGRESS NOTES
Patient here for tendon issues after fracture of left ankle. Juliano Nelson MD   Electronically signed by Aminah Petty LPN on 2/22/2024 at 2:08 PM

## 2024-02-23 NOTE — PROGRESS NOTES
24     Marisela Guillaume    : 1950 Sex: female   Age: 74 y.o.    Patient was referred by: Diaz Broussard DO  Patient's PCP/Provider is:  Juliano Nelson MD    Subjective:    Patient seen today for evaluation regarding painful left ankle and hindfoot region.    Chief Complaint   Patient presents with    Foot Pain     Left foot tendon issues       HPI: Patient stated she fractured her left ankle almost 2 years ago.  Patient has had pain, swelling, and disability ever since into the left ankle region.  Patient denies any recent injury to the left lower extremity.  Patient has tried OTC options without improvement noted.  No other additional abnormalities noted.    ROS:  Const: Positives and pertinent negatives as per HPI.     Musculo: Denies symptoms other than stated above.  Neuro: Denies symptoms other than stated above.  Skin: Denies symptoms other than stated above.    Current Medications:    Current Outpatient Medications:     budesonide-formoterol (SYMBICORT) 160-4.5 MCG/ACT AERO, Inhale 2 puffs into the lungs in the morning and 2 puffs in the evening. .  Rinse mouth and spit after each use.., Disp: 10.2 g, Rfl: 1    albuterol-ipratropium (COMBIVENT RESPIMAT)  MCG/ACT AERS inhaler, Inhale 1 puff into the lungs every 4 hours as needed for Wheezing, Disp: 4 g, Rfl: 1    guaiFENesin-dextromethorphan (ROBITUSSIN DM) 100-10 MG/5ML syrup, Take 10 mLs by mouth 4 times daily as needed for Cough, Disp: 473 mL, Rfl: 0    Omega-3 Fatty Acids (FISH OIL) 1000 MG capsule, Take 1 capsule by mouth daily, Disp: , Rfl:     denosumab (PROLIA) 60 MG/ML SOSY SC injection, Inject 1 mL into the skin every 6 months, Disp: , Rfl:     senna (SENOKOT) 8.6 MG tablet, Take 1 tablet by mouth daily as needed for Constipation, Disp: , Rfl:     amLODIPine (NORVASC) 10 MG tablet, Take 1 tablet by mouth daily, Disp: , Rfl:     calcium carbonate 600 MG TABS tablet, Take 1 tablet by mouth daily, Disp: , Rfl:     Multiple

## 2024-02-28 DIAGNOSIS — M19.172 POST-TRAUMATIC ARTHRITIS OF ANKLE, LEFT: Primary | ICD-10-CM

## 2024-02-29 ENCOUNTER — OFFICE VISIT (OUTPATIENT)
Dept: PODIATRY | Age: 74
End: 2024-02-29
Payer: MEDICARE

## 2024-02-29 VITALS — BODY MASS INDEX: 39.09 KG/M2 | WEIGHT: 229 LBS | HEIGHT: 64 IN

## 2024-02-29 DIAGNOSIS — M25.572 PAIN IN LEFT ANKLE AND JOINTS OF LEFT FOOT: ICD-10-CM

## 2024-02-29 DIAGNOSIS — S82.852A LEFT TRIMALLEOLAR FRACTURE, CLOSED, INITIAL ENCOUNTER: Primary | ICD-10-CM

## 2024-02-29 DIAGNOSIS — M25.372 ANKLE INSTABILITY, LEFT: ICD-10-CM

## 2024-02-29 DIAGNOSIS — M65.9 SYNOVITIS OF LEFT ANKLE: ICD-10-CM

## 2024-02-29 DIAGNOSIS — R26.2 DIFFICULTY WALKING: ICD-10-CM

## 2024-02-29 PROCEDURE — 1090F PRES/ABSN URINE INCON ASSESS: CPT | Performed by: PODIATRIST

## 2024-02-29 PROCEDURE — G8484 FLU IMMUNIZE NO ADMIN: HCPCS | Performed by: PODIATRIST

## 2024-02-29 PROCEDURE — 1123F ACP DISCUSS/DSCN MKR DOCD: CPT | Performed by: PODIATRIST

## 2024-02-29 PROCEDURE — 3017F COLORECTAL CA SCREEN DOC REV: CPT | Performed by: PODIATRIST

## 2024-02-29 PROCEDURE — 1036F TOBACCO NON-USER: CPT | Performed by: PODIATRIST

## 2024-02-29 PROCEDURE — G8399 PT W/DXA RESULTS DOCUMENT: HCPCS | Performed by: PODIATRIST

## 2024-02-29 PROCEDURE — G8417 CALC BMI ABV UP PARAM F/U: HCPCS | Performed by: PODIATRIST

## 2024-02-29 PROCEDURE — 99213 OFFICE O/P EST LOW 20 MIN: CPT | Performed by: PODIATRIST

## 2024-02-29 PROCEDURE — G8427 DOCREV CUR MEDS BY ELIG CLIN: HCPCS | Performed by: PODIATRIST

## 2024-03-01 NOTE — PROGRESS NOTES
Patient is in today for 1 week left foot pain. Patient says she does notice improvement, but still having issues. Pcp is Juliano Nelson MD  Last ov 2/15/24  
management  Radiographs were again reviewed with patient in detail today.  Due to chronicity of symptoms and traumatic nature of injury, we did discuss continued conservative care options.  Patient was given a prescription to get evaluated for a hinged AFO to the left lower extremity.  In the interim patient was advised on use of her ankle support left lower extremity with her supportive shoe gear daily.    Patient will be followed up at a later date for continued evaluation and management, to assess improvement with bracing.  She was advised to call the office with any questions or concerns in the interim.      Seen By:    Miguel Pichardo Jr, DPM    Electronically signed by Miguel Pichardo Jr, DPM on 2/29/2024 at 8:03 PM    This note was created using voice recognition software.  The note was reviewed however may contain grammatical errors.

## 2024-03-11 ENCOUNTER — HOSPITAL ENCOUNTER (OUTPATIENT)
Dept: INFUSION THERAPY | Age: 74
Setting detail: INFUSION SERIES
Discharge: HOME | End: 2024-03-11

## 2024-03-11 ENCOUNTER — HOSPITAL ENCOUNTER (OUTPATIENT)
Age: 74
Discharge: HOME | End: 2024-03-11
Payer: MEDICARE

## 2024-03-11 DIAGNOSIS — M81.0 AGE-RELATED OSTEOPOROSIS WITHOUT CURRENT PATHOLOGICAL FRACTURE: ICD-10-CM

## 2024-03-11 LAB
ALBUMIN SERPL-MCNC: 4.5 G/DL (ref 3.5–5.2)
ALP SERPL-CCNC: 76 U/L (ref 35–104)
ALT SERPL-CCNC: 29 U/L (ref 0–32)
ANION GAP SERPL CALCULATED.3IONS-SCNC: 13 MMOL/L (ref 7–16)
AST SERPL-CCNC: 26 U/L (ref 0–31)
BILIRUB SERPL-MCNC: 0.8 MG/DL (ref 0–1.2)
BUN SERPL-MCNC: 17 MG/DL (ref 6–23)
CALCIUM SERPL-MCNC: 9.8 MG/DL (ref 8.6–10.2)
CHLORIDE SERPL-SCNC: 103 MMOL/L (ref 98–107)
CO2 SERPL-SCNC: 26 MMOL/L (ref 22–29)
CREAT SERPL-MCNC: 0.9 MG/DL (ref 0.5–1)
GFR SERPL CREATININE-BSD FRML MDRD: >60 ML/MIN/1.73M2
GLUCOSE SERPL-MCNC: 96 MG/DL (ref 74–99)
POTASSIUM SERPL-SCNC: 4 MMOL/L (ref 3.5–5)
PROT SERPL-MCNC: 8.4 G/DL (ref 6.4–8.3)
SODIUM SERPL-SCNC: 142 MMOL/L (ref 132–146)

## 2024-03-11 PROCEDURE — 36415 COLL VENOUS BLD VENIPUNCTURE: CPT

## 2024-03-11 PROCEDURE — 80053 COMPREHEN METABOLIC PANEL: CPT

## 2024-03-13 ENCOUNTER — HOSPITAL ENCOUNTER (OUTPATIENT)
Dept: INFUSION THERAPY | Age: 74
Setting detail: INFUSION SERIES
Discharge: HOME | End: 2024-03-13
Payer: MEDICARE

## 2024-03-13 VITALS
TEMPERATURE: 97.2 F | OXYGEN SATURATION: 99 % | DIASTOLIC BLOOD PRESSURE: 67 MMHG | HEART RATE: 87 BPM | RESPIRATION RATE: 18 BRPM | SYSTOLIC BLOOD PRESSURE: 147 MMHG

## 2024-03-13 DIAGNOSIS — M81.0 AGE-RELATED OSTEOPOROSIS WITHOUT CURRENT PATHOLOGICAL FRACTURE: Primary | ICD-10-CM

## 2024-03-13 PROCEDURE — 96372 THER/PROPH/DIAG INJ SC/IM: CPT

## 2024-03-13 PROCEDURE — 6360000002 HC RX W HCPCS: Performed by: INTERNAL MEDICINE

## 2024-03-13 RX ADMIN — DENOSUMAB 60 MG: 60 INJECTION SUBCUTANEOUS at 10:40

## 2024-05-29 ENCOUNTER — OFFICE VISIT (OUTPATIENT)
Dept: PODIATRY | Age: 74
End: 2024-05-29
Payer: MEDICARE

## 2024-05-29 VITALS — HEIGHT: 64 IN | WEIGHT: 229 LBS | BODY MASS INDEX: 39.09 KG/M2

## 2024-05-29 DIAGNOSIS — M19.172 POST-TRAUMATIC ARTHRITIS OF ANKLE, LEFT: Primary | ICD-10-CM

## 2024-05-29 DIAGNOSIS — T84.84XA PAINFUL ORTHOPAEDIC HARDWARE (HCC): ICD-10-CM

## 2024-05-29 DIAGNOSIS — R26.2 DIFFICULTY WALKING: ICD-10-CM

## 2024-05-29 DIAGNOSIS — M25.572 PAIN IN LEFT ANKLE AND JOINTS OF LEFT FOOT: ICD-10-CM

## 2024-05-29 PROCEDURE — 1090F PRES/ABSN URINE INCON ASSESS: CPT | Performed by: PODIATRIST

## 2024-05-29 PROCEDURE — G8399 PT W/DXA RESULTS DOCUMENT: HCPCS | Performed by: PODIATRIST

## 2024-05-29 PROCEDURE — 3017F COLORECTAL CA SCREEN DOC REV: CPT | Performed by: PODIATRIST

## 2024-05-29 PROCEDURE — 1036F TOBACCO NON-USER: CPT | Performed by: PODIATRIST

## 2024-05-29 PROCEDURE — 1123F ACP DISCUSS/DSCN MKR DOCD: CPT | Performed by: PODIATRIST

## 2024-05-29 PROCEDURE — 99213 OFFICE O/P EST LOW 20 MIN: CPT | Performed by: PODIATRIST

## 2024-05-29 PROCEDURE — G8417 CALC BMI ABV UP PARAM F/U: HCPCS | Performed by: PODIATRIST

## 2024-05-29 PROCEDURE — G8427 DOCREV CUR MEDS BY ELIG CLIN: HCPCS | Performed by: PODIATRIST

## 2024-05-29 NOTE — PROGRESS NOTES
24     Marisela Guillaume    : 1950   Sex: female    Age: 74 y.o.    Patient's PCP/Provider is:  Juliano Nelson MD    Subjective:  Patient is seen today for follow-up regarding continued care regarding chronic posttraumatic arthritis left lower extremity.  Patient did get her AFO device which she is tolerating.  She is still having some issues to the medial malleoli region due to a painful prominent screw head.  Patient has had her AFO adjusted multiple times to try to relieve pressure in the area.  She is still having difficulty at this time.  Patient has adjusted shoe gear as instructed.  No other additional abnormalities noted.    Chief Complaint   Patient presents with    Foot Pain     Left foot        ROS:  Const: Positives and pertinent negatives as per HPI.    Musculo: Denies symptoms other than stated above.  Neuro: Denies symptoms other than stated above.  Skin: Denies symptoms other than stated above.    Current Medications:    Current Outpatient Medications:     budesonide-formoterol (SYMBICORT) 160-4.5 MCG/ACT AERO, Inhale 2 puffs into the lungs in the morning and 2 puffs in the evening. .  Rinse mouth and spit after each use.., Disp: 10.2 g, Rfl: 1    albuterol-ipratropium (COMBIVENT RESPIMAT)  MCG/ACT AERS inhaler, Inhale 1 puff into the lungs every 4 hours as needed for Wheezing, Disp: 4 g, Rfl: 1    guaiFENesin-dextromethorphan (ROBITUSSIN DM) 100-10 MG/5ML syrup, Take 10 mLs by mouth 4 times daily as needed for Cough, Disp: 473 mL, Rfl: 0    Omega-3 Fatty Acids (FISH OIL) 1000 MG capsule, Take 1 capsule by mouth daily, Disp: , Rfl:     denosumab (PROLIA) 60 MG/ML SOSY SC injection, Inject 1 mL into the skin every 6 months, Disp: , Rfl:     senna (SENOKOT) 8.6 MG tablet, Take 1 tablet by mouth daily as needed for Constipation, Disp: , Rfl:     amLODIPine (NORVASC) 10 MG tablet, Take 1 tablet by mouth daily, Disp: , Rfl:     calcium carbonate 600 MG TABS tablet, Take 1 tablet by mouth

## 2024-05-29 NOTE — PROGRESS NOTES
Patient is in today for 3 month follow up of left foot. Patient says she did get the AFO brace but it is causing pain because it rubs on her incision site. Patient says she has been going back to the DME for alterations. Pcp is Juliano Nelson MD  Last ov 2/15/24

## 2024-08-22 ENCOUNTER — HOSPITAL ENCOUNTER (OUTPATIENT)
Age: 74
Discharge: HOME | End: 2024-08-22
Payer: MEDICARE

## 2024-08-22 LAB
25(OH)D3 SERPL-MCNC: 67.7 NG/ML (ref 30–100)
ALBUMIN SERPL-MCNC: 4.6 G/DL (ref 3.5–5.2)
ALP SERPL-CCNC: 70 U/L (ref 35–104)
ALT SERPL-CCNC: 21 U/L (ref 0–32)
ANION GAP SERPL CALCULATED.3IONS-SCNC: 13 MMOL/L (ref 7–16)
AST SERPL-CCNC: 26 U/L (ref 0–31)
BILIRUB SERPL-MCNC: 0.8 MG/DL (ref 0–1.2)
BUN SERPL-MCNC: 18 MG/DL (ref 6–23)
CALCIUM SERPL-MCNC: 9.4 MG/DL (ref 8.6–10.2)
CHLORIDE SERPL-SCNC: 102 MMOL/L (ref 98–107)
CHOLEST SERPL-MCNC: 194 MG/DL
CO2 SERPL-SCNC: 26 MMOL/L (ref 22–29)
CREAT SERPL-MCNC: 0.9 MG/DL (ref 0.5–1)
ERYTHROCYTE [DISTWIDTH] IN BLOOD BY AUTOMATED COUNT: 12.1 % (ref 12–16)
GFR, ESTIMATED: 69 ML/MIN/1.73M2
GLUCOSE SERPL-MCNC: 101 MG/DL (ref 74–99)
HCT VFR BLD AUTO: 42 % (ref 34–48)
HDLC SERPL-MCNC: 57 MG/DL
HGB BLD-MCNC: 13.8 G/DL (ref 11.5–15.5)
LDLC SERPL CALC-MCNC: 116 MG/DL
MCH RBC QN AUTO: 31 PG (ref 26–35)
MCHC RBC AUTO-ENTMCNC: 32.9 G/DL (ref 32–34.5)
MCV RBC AUTO: 94.4 FL (ref 80–99.9)
PLATELET # BLD AUTO: 273 K/UL (ref 130–450)
PMV BLD AUTO: 10.4 FL (ref 7–12)
POTASSIUM SERPL-SCNC: 3.5 MMOL/L (ref 3.5–5)
PROT SERPL-MCNC: 8.3 G/DL (ref 6.4–8.3)
RBC # BLD AUTO: 4.45 M/UL (ref 3.5–5.5)
SODIUM SERPL-SCNC: 141 MMOL/L (ref 132–146)
TRIGL SERPL-MCNC: 106 MG/DL
TSH SERPL DL<=0.05 MIU/L-ACNC: 1.36 UIU/ML (ref 0.27–4.2)
VLDLC SERPL CALC-MCNC: 21 MG/DL
WBC OTHER # BLD: 7.5 K/UL (ref 4.5–11.5)

## 2024-08-22 PROCEDURE — 80053 COMPREHEN METABOLIC PANEL: CPT

## 2024-08-22 PROCEDURE — 85027 COMPLETE CBC AUTOMATED: CPT

## 2024-08-22 PROCEDURE — 80061 LIPID PANEL: CPT

## 2024-08-22 PROCEDURE — 84443 ASSAY THYROID STIM HORMONE: CPT

## 2024-08-22 PROCEDURE — 36415 COLL VENOUS BLD VENIPUNCTURE: CPT

## 2024-08-22 PROCEDURE — 82306 VITAMIN D 25 HYDROXY: CPT

## 2024-09-13 ENCOUNTER — HOSPITAL ENCOUNTER (OUTPATIENT)
Dept: INFUSION THERAPY | Age: 74
Setting detail: INFUSION SERIES
Discharge: HOME | End: 2024-09-13
Payer: MEDICARE

## 2024-09-13 VITALS
RESPIRATION RATE: 24 BRPM | DIASTOLIC BLOOD PRESSURE: 72 MMHG | SYSTOLIC BLOOD PRESSURE: 176 MMHG | OXYGEN SATURATION: 97 % | TEMPERATURE: 98 F | HEART RATE: 70 BPM

## 2024-09-13 DIAGNOSIS — M81.0 AGE-RELATED OSTEOPOROSIS WITHOUT CURRENT PATHOLOGICAL FRACTURE: Primary | ICD-10-CM

## 2024-09-13 PROCEDURE — 96372 THER/PROPH/DIAG INJ SC/IM: CPT

## 2024-09-13 PROCEDURE — 6360000002 HC RX W HCPCS: Performed by: INTERNAL MEDICINE

## 2024-09-13 RX ADMIN — DENOSUMAB 60 MG: 60 INJECTION SUBCUTANEOUS at 10:37

## 2024-12-16 ENCOUNTER — HOSPITAL ENCOUNTER (OUTPATIENT)
Dept: MAMMOGRAPHY | Age: 74
Discharge: HOME | End: 2024-12-18
Attending: INTERNAL MEDICINE
Payer: MEDICARE

## 2024-12-16 DIAGNOSIS — M81.0 AGE-RELATED OSTEOPOROSIS WITHOUT CURRENT PATHOLOGICAL FRACTURE: ICD-10-CM

## 2024-12-16 PROCEDURE — 77080 DXA BONE DENSITY AXIAL: CPT

## 2025-03-14 ENCOUNTER — HOSPITAL ENCOUNTER (OUTPATIENT)
Dept: INFUSION THERAPY | Age: 75
Setting detail: INFUSION SERIES
Discharge: HOME | End: 2025-03-14
Payer: MEDICARE

## 2025-03-14 VITALS
DIASTOLIC BLOOD PRESSURE: 67 MMHG | HEART RATE: 74 BPM | SYSTOLIC BLOOD PRESSURE: 150 MMHG | RESPIRATION RATE: 22 BRPM | TEMPERATURE: 98 F | OXYGEN SATURATION: 100 %

## 2025-03-14 DIAGNOSIS — M81.0 AGE-RELATED OSTEOPOROSIS WITHOUT CURRENT PATHOLOGICAL FRACTURE: Primary | ICD-10-CM

## 2025-03-14 PROCEDURE — 96372 THER/PROPH/DIAG INJ SC/IM: CPT

## 2025-03-14 PROCEDURE — 6360000002 HC RX W HCPCS: Performed by: INTERNAL MEDICINE

## 2025-03-14 RX ADMIN — DENOSUMAB 60 MG: 60 INJECTION SUBCUTANEOUS at 11:10

## 2025-04-15 ENCOUNTER — OFFICE VISIT (OUTPATIENT)
Dept: PODIATRY | Age: 75
End: 2025-04-15
Payer: MEDICARE

## 2025-04-15 VITALS — BODY MASS INDEX: 37.56 KG/M2 | HEIGHT: 64 IN | WEIGHT: 220 LBS

## 2025-04-15 DIAGNOSIS — M65.972 SYNOVITIS OF LEFT ANKLE: ICD-10-CM

## 2025-04-15 DIAGNOSIS — I89.0 LYMPHEDEMA OF BOTH LOWER EXTREMITIES: ICD-10-CM

## 2025-04-15 DIAGNOSIS — R26.2 DIFFICULTY WALKING: ICD-10-CM

## 2025-04-15 DIAGNOSIS — M19.172 POST-TRAUMATIC ARTHRITIS OF ANKLE, LEFT: Primary | ICD-10-CM

## 2025-04-15 PROCEDURE — 1159F MED LIST DOCD IN RCRD: CPT | Performed by: PODIATRIST

## 2025-04-15 PROCEDURE — G8427 DOCREV CUR MEDS BY ELIG CLIN: HCPCS | Performed by: PODIATRIST

## 2025-04-15 PROCEDURE — 1090F PRES/ABSN URINE INCON ASSESS: CPT | Performed by: PODIATRIST

## 2025-04-15 PROCEDURE — 1123F ACP DISCUSS/DSCN MKR DOCD: CPT | Performed by: PODIATRIST

## 2025-04-15 PROCEDURE — G8399 PT W/DXA RESULTS DOCUMENT: HCPCS | Performed by: PODIATRIST

## 2025-04-15 PROCEDURE — 3017F COLORECTAL CA SCREEN DOC REV: CPT | Performed by: PODIATRIST

## 2025-04-15 PROCEDURE — 1036F TOBACCO NON-USER: CPT | Performed by: PODIATRIST

## 2025-04-15 PROCEDURE — G8417 CALC BMI ABV UP PARAM F/U: HCPCS | Performed by: PODIATRIST

## 2025-04-15 PROCEDURE — 99213 OFFICE O/P EST LOW 20 MIN: CPT | Performed by: PODIATRIST

## 2025-04-15 NOTE — PROGRESS NOTES
Patient is in today for follow up of left ankle. Patient was seen last May regarding her left ankle. Pcp is Juliano Nelson MD  Last ov 12/16/24

## 2025-04-15 NOTE — PROGRESS NOTES
4/15/25     Marisela Guillaume    : 1950   Sex: female    Age: 75 y.o.    Patient's PCP/Provider is:  Juliano Nelson MD    Subjective:  Patient is seen today for follow-up regarding continued evaluation regarding chronic lymphedema issues to both lower extremities.  Patient still dealing with the traumatic arthritic issues from the previous ankle fracture repair.  Patient denies any additional abnormalities at this time.    Chief Complaint   Patient presents with    Ankle Pain     Left ankle        ROS:  Const: Positives and pertinent negatives as per HPI.    Musculo: Denies symptoms other than stated above.  Neuro: Denies symptoms other than stated above.  Skin: Denies symptoms other than stated above.    Current Medications:    Current Outpatient Medications:     budesonide-formoterol (SYMBICORT) 160-4.5 MCG/ACT AERO, Inhale 2 puffs into the lungs in the morning and 2 puffs in the evening. .  Rinse mouth and spit after each use.., Disp: 10.2 g, Rfl: 1    albuterol-ipratropium (COMBIVENT RESPIMAT)  MCG/ACT AERS inhaler, Inhale 1 puff into the lungs every 4 hours as needed for Wheezing, Disp: 4 g, Rfl: 1    guaiFENesin-dextromethorphan (ROBITUSSIN DM) 100-10 MG/5ML syrup, Take 10 mLs by mouth 4 times daily as needed for Cough, Disp: 473 mL, Rfl: 0    Omega-3 Fatty Acids (FISH OIL) 1000 MG capsule, Take 1 capsule by mouth daily, Disp: , Rfl:     denosumab (PROLIA) 60 MG/ML SOSY SC injection, Inject 1 mL into the skin every 6 months, Disp: , Rfl:     senna (SENOKOT) 8.6 MG tablet, Take 1 tablet by mouth daily as needed for Constipation, Disp: , Rfl:     amLODIPine (NORVASC) 10 MG tablet, Take 1 tablet by mouth daily, Disp: , Rfl:     calcium carbonate 600 MG TABS tablet, Take 1 tablet by mouth daily, Disp: , Rfl:     Multiple Vitamins-Minerals (CENTRUM SILVER PO), Take 1 tablet by mouth daily, Disp: , Rfl:     potassium chloride (MICRO-K) 10 MEQ extended release capsule, Take 1 capsule by mouth 2 times daily

## 2025-07-02 ENCOUNTER — PROCEDURE VISIT (OUTPATIENT)
Dept: PODIATRY | Age: 75
End: 2025-07-02
Payer: MEDICARE

## 2025-07-02 VITALS
BODY MASS INDEX: 38.55 KG/M2 | DIASTOLIC BLOOD PRESSURE: 82 MMHG | WEIGHT: 228 LBS | SYSTOLIC BLOOD PRESSURE: 143 MMHG | TEMPERATURE: 97.1 F

## 2025-07-02 DIAGNOSIS — I89.0 LYMPHEDEMA OF BOTH LOWER EXTREMITIES: ICD-10-CM

## 2025-07-02 DIAGNOSIS — R26.2 DIFFICULTY WALKING: ICD-10-CM

## 2025-07-02 DIAGNOSIS — B35.1 ONYCHOMYCOSIS: Primary | ICD-10-CM

## 2025-07-02 DIAGNOSIS — M19.172 POST-TRAUMATIC ARTHRITIS OF ANKLE, LEFT: ICD-10-CM

## 2025-07-02 DIAGNOSIS — I73.9 PVD (PERIPHERAL VASCULAR DISEASE): ICD-10-CM

## 2025-07-02 PROCEDURE — 1123F ACP DISCUSS/DSCN MKR DOCD: CPT | Performed by: PODIATRIST

## 2025-07-02 PROCEDURE — 99213 OFFICE O/P EST LOW 20 MIN: CPT | Performed by: PODIATRIST

## 2025-07-02 PROCEDURE — 3017F COLORECTAL CA SCREEN DOC REV: CPT | Performed by: PODIATRIST

## 2025-07-02 PROCEDURE — G8399 PT W/DXA RESULTS DOCUMENT: HCPCS | Performed by: PODIATRIST

## 2025-07-02 PROCEDURE — G8427 DOCREV CUR MEDS BY ELIG CLIN: HCPCS | Performed by: PODIATRIST

## 2025-07-02 PROCEDURE — G8417 CALC BMI ABV UP PARAM F/U: HCPCS | Performed by: PODIATRIST

## 2025-07-02 PROCEDURE — 1090F PRES/ABSN URINE INCON ASSESS: CPT | Performed by: PODIATRIST

## 2025-07-02 PROCEDURE — 1159F MED LIST DOCD IN RCRD: CPT | Performed by: PODIATRIST

## 2025-07-02 PROCEDURE — 11721 DEBRIDE NAIL 6 OR MORE: CPT | Performed by: PODIATRIST

## 2025-07-02 PROCEDURE — 1036F TOBACCO NON-USER: CPT | Performed by: PODIATRIST

## 2025-07-02 NOTE — PROGRESS NOTES
Juliano Nelson MD  LOV 6/27/25  
  Procedure Laterality Date    ANKLE FRACTURE SURGERY Left 7/20/2022    LEFT ANKLE EXTERNAL FIXATOR performed by Shamar Díaz DO at [facility] OR    ANKLE FRACTURE SURGERY Left 7/25/2022    LEFT ANKLE OPEN REDUCTION INTERNAL FIXATION **DO NOT CHANGE TIME** performed by Shamar Díaz DO at [facility] OR    HYSTERECTOMY (CERVIX STATUS UNKNOWN)       Past Medical History:   Diagnosis Date    Hypertension     PONV (postoperative nausea and vomiting)        Vitals:    07/02/25 1150   BP: (!) 143/82   BP Site: Right Upper Arm   Temp: 97.1 °F (36.2 °C)   TempSrc: Temporal   Weight: 103.4 kg (228 lb)       Exam:  Pedal pulses diminished to palpation bilateral foot.  At this time the nail/s 1, 2, 5 right foot and nail/s 1, 2, 5 left foot are noted to be thickened, dystrophic and discolored with subungual debris present.  Tenderness noted to palpation.  Diminished hair growth is noted to both lower extremities.  Lymphedema issues noted with mild varicosities and stasis skin changes present bilaterally.  Coolness is noted to the digital regions to palpation.  Capillary fill time delayed digital areas bilateral foot.  No heel fissuring or macerations of the web spaces.  No plantar calluses and/or ulcerative areas are noted.  Patient is having difficulty with gait/walking.             Plan Per Assessment  Marisela was seen today for toe pain.    Diagnoses and all orders for this visit:    Onychomycosis    Lymphedema of both lower extremities    PVD (peripheral vascular disease)    Post-traumatic arthritis of ankle, left    Difficulty walking        1. Evaluation and Management  2. Manual and electrical debridement of the mycotic nails was performed for thickness and length to prevent injection and/or ulceration.  3. Discussed additional lymphedema lower extremity care techniques with patient today.  We did discuss continued use of her compression garments with daily activities.  She is going to check with the therapy facility on availability

## 2025-09-02 ENCOUNTER — PROCEDURE VISIT (OUTPATIENT)
Dept: PODIATRY | Age: 75
End: 2025-09-02
Payer: MEDICARE

## 2025-09-02 VITALS — HEART RATE: 80 BPM | DIASTOLIC BLOOD PRESSURE: 84 MMHG | SYSTOLIC BLOOD PRESSURE: 146 MMHG | TEMPERATURE: 97.5 F

## 2025-09-02 DIAGNOSIS — M19.172 POST-TRAUMATIC ARTHRITIS OF ANKLE, LEFT: ICD-10-CM

## 2025-09-02 DIAGNOSIS — I89.0 LYMPHEDEMA OF BOTH LOWER EXTREMITIES: ICD-10-CM

## 2025-09-02 DIAGNOSIS — R26.2 DIFFICULTY WALKING: ICD-10-CM

## 2025-09-02 DIAGNOSIS — I73.9 PVD (PERIPHERAL VASCULAR DISEASE): ICD-10-CM

## 2025-09-02 DIAGNOSIS — B35.1 ONYCHOMYCOSIS: Primary | ICD-10-CM

## 2025-09-02 PROCEDURE — 1123F ACP DISCUSS/DSCN MKR DOCD: CPT | Performed by: PODIATRIST

## 2025-09-02 PROCEDURE — 1159F MED LIST DOCD IN RCRD: CPT | Performed by: PODIATRIST

## 2025-09-02 PROCEDURE — G8427 DOCREV CUR MEDS BY ELIG CLIN: HCPCS | Performed by: PODIATRIST

## 2025-09-02 PROCEDURE — 1090F PRES/ABSN URINE INCON ASSESS: CPT | Performed by: PODIATRIST

## 2025-09-02 PROCEDURE — 99213 OFFICE O/P EST LOW 20 MIN: CPT | Performed by: PODIATRIST

## 2025-09-02 PROCEDURE — G8399 PT W/DXA RESULTS DOCUMENT: HCPCS | Performed by: PODIATRIST

## 2025-09-02 PROCEDURE — G8417 CALC BMI ABV UP PARAM F/U: HCPCS | Performed by: PODIATRIST

## 2025-09-02 PROCEDURE — 11721 DEBRIDE NAIL 6 OR MORE: CPT | Performed by: PODIATRIST

## 2025-09-02 PROCEDURE — 1036F TOBACCO NON-USER: CPT | Performed by: PODIATRIST

## 2025-09-02 PROCEDURE — 3017F COLORECTAL CA SCREEN DOC REV: CPT | Performed by: PODIATRIST

## 2025-09-02 RX ORDER — MELOXICAM 15 MG/1
15 TABLET ORAL DAILY
COMMUNITY
Start: 2025-07-30

## 2025-09-03 ENCOUNTER — TELEPHONE (OUTPATIENT)
Dept: PODIATRY | Age: 75
End: 2025-09-03

## (undated) DEVICE — ELECTRODE PT RET AD L9FT HI MOIST COND ADH HYDRGEL CORDED

## (undated) DEVICE — BIT DRL L140MM DIA2MM QUIK CPL 3 FLUT CALIB DEPTH MRK W/O

## (undated) DEVICE — NDL CNTR 40CT FM MAG: Brand: MEDLINE INDUSTRIES, INC.

## (undated) DEVICE — Z DISCONTINUED USE 2275686 GLOVE SURG SZ 8 L12IN FNGR THK13MIL WHT ISOLEX POLYISOPRENE

## (undated) DEVICE — GOWN,SIRUS,FABRNF,XL,20/CS: Brand: MEDLINE

## (undated) DEVICE — BANDAGE COMPR W6INXL5YD SELF ADH COHESIVE CO FLX

## (undated) DEVICE — GLOVE ORANGE PI 8   MSG9080

## (undated) DEVICE — 3M™ STERI-DRAPE™ U-DRAPE 1015: Brand: STERI-DRAPE™

## (undated) DEVICE — ROD EXT FIX L350MM DIA11MM C FBR MR CONDITIONAL

## (undated) DEVICE — CYSTO/BLADDER IRRIGATION SET, REGULATING CLAMP

## (undated) DEVICE — INTENDED FOR TISSUE SEPARATION, AND OTHER PROCEDURES THAT REQUIRE A SHARP SURGICAL BLADE TO PUNCTURE OR CUT.: Brand: BARD-PARKER ® STAINLESS STEEL BLADES

## (undated) DEVICE — COVER,LIGHT HANDLE,FLX,1/PK: Brand: MEDLINE INDUSTRIES, INC.

## (undated) DEVICE — BANDAGE,ELASTIC,ESMARK,STERILE,4"X9',LF: Brand: MEDLINE

## (undated) DEVICE — NEEDLE HYPO 25GA L1.5IN BLU POLYPR HUB S STL REG BVL STR

## (undated) DEVICE — SOLUTION IV 1000ML 0.9% SOD CHL PH 5 INJ USP VIAFLX PLAS

## (undated) DEVICE — SCREW EXT FIX L80MM DIA4MM THRD DIA3MM S STL SELF DRL BLNT

## (undated) DEVICE — STRIP,CLOSURE,WOUND,MEDI-STRIP,1/4X3: Brand: MEDLINE

## (undated) DEVICE — SYRINGE MED 10ML TRNSLUC BRL PLUNG BLK MRK POLYPR CTRL

## (undated) DEVICE — SOLUTION IV IRRIG POUR BRL 0.9% SODIUM CHL 2F7124

## (undated) DEVICE — PADDING,UNDERCAST,COTTON, 4"X4YD STERILE: Brand: MEDLINE

## (undated) DEVICE — DOUBLE BASIN SET: Brand: MEDLINE INDUSTRIES, INC.

## (undated) DEVICE — Z DISCONTINUED USE 2272124 DRAPE SURG XL N INVASIVE 2 LAYR DISP

## (undated) DEVICE — SCREW EXT FIX L150MM DIA5MM THRD L150MM S STL SELF DRL MR

## (undated) DEVICE — SHEET SUPPORT

## (undated) DEVICE — PITCHER PT 1200ML W HNDL CSR WRP

## (undated) DEVICE — APPLICATOR MEDICATED 26 CC SOLUTION HI LT ORNG CHLORAPREP

## (undated) DEVICE — SHEET,DRAPE,40X58,STERILE: Brand: MEDLINE

## (undated) DEVICE — DRESSING GZ W1XL8IN COT XRFRM N ADH OVERWRAP CURAD

## (undated) DEVICE — PACK,EXTREMITY,ORTHOMAX,5/CS: Brand: MEDLINE

## (undated) DEVICE — PENCIL ES L3M BTTN SWCH HOLSTER W/ BLDE ELECTRD EDGE

## (undated) DEVICE — SOLUTION IV IRRIG 500ML 0.9% SODIUM CHL 2F7123

## (undated) DEVICE — MARKER,SKIN,WI/RULER AND LABELS: Brand: MEDLINE

## (undated) DEVICE — BIT DRL L180MM DIA2.5MM G QUIK CPL W/O STP REUSE

## (undated) DEVICE — CLAMP EXT FIX DIA8/11MM COMB CLP ON SELF HLD

## (undated) DEVICE — MEDI-VAC YANKAUER SUCTION HANDLE: Brand: CARDINAL HEALTH

## (undated) DEVICE — TUBING, SUCTION, 1/4" X 10', STRAIGHT: Brand: MEDLINE

## (undated) DEVICE — BANDAGE COMPR M W6INXL10YD WHT BGE VELC E MTRX HK AND LOOP

## (undated) DEVICE — CLAMP EXT FIX L PIN 6 POS MAG RESONANCE CONDITIONAL

## (undated) DEVICE — SOLUTION SCRB 1% POVIDONE IOD BRN ANTIMIC SKIN CLN

## (undated) DEVICE — DRAPE 64X41IN RADIOLOGY C ARM EQUIP STER

## (undated) DEVICE — 3M™ IOBAN™ 2 ANTIMICROBIAL INCISE DRAPE 6650EZ: Brand: IOBAN™ 2

## (undated) DEVICE — SET MAJOR INSTR ORTHO

## (undated) DEVICE — GAUZE,SPONGE,4"X4",16PLY,STRL,LF,10/TRAY: Brand: MEDLINE

## (undated) DEVICE — POST EXT FIX DIA11MM 30DEG OUTRIG MAG RESONANCE CONDITIONAL

## (undated) DEVICE — TOWEL,OR,DSP,ST,BLUE,STD,6/PK,12PK/CS: Brand: MEDLINE

## (undated) DEVICE — Device

## (undated) DEVICE — ROD EXT FIX L150MM DIA11MM C FBR MR CONDITIONAL

## (undated) DEVICE — ROD EXT FIX L200MM DIA11MM C FBR MAG RESONANCE CONDITIONAL

## (undated) DEVICE — SYRINGE IRRIG 60ML SFT PLIABLE BLB EZ TO GRP 1 HND USE W/

## (undated) DEVICE — GUIDEWIRE ORTH L150MM DIA1.25MM S STL NTHRD FOR 4MM CANN

## (undated) DEVICE — BANDAGE COMPR L W4INXL11YD 100% COT WVN E DBL LEN CLP CLSR

## (undated) DEVICE — GOWN,SIRUS,POLYRNF,BRTHSLV,XLN/XL,20/CS: Brand: MEDLINE

## (undated) DEVICE — BIT DRL L110MM DIA2.5MM G QUIK CPL W/O STP REUSE

## (undated) DEVICE — SPONGE,LAP,12"X12",XR,ST,5/PK,40PK/CS: Brand: MEDLINE

## (undated) DEVICE — BANDAGE COMPR W4INXL5YD WHT BGE POLY COT M E WRP WV HK AND

## (undated) DEVICE — ROD EXT FIX L300MM DIA11MM C FBR MR CONDITIONAL

## (undated) DEVICE — SOLUTION PREP POVIDONE IOD FOR SKIN MUCOUS MEM PRIOR TO

## (undated) DEVICE — BIT DRL L160MM DIA2.7MM CANN QUIK CPL ADJ STP REUSE FOR

## (undated) DEVICE — BANDAGE COMPR W6INXL12FT SMOOTH FOR LIMB EXSANG ESMARCH

## (undated) DEVICE — BIT DRL L200MM DIA2.8MM CALIB L100MM FOR 3.5MM VA LCP PROX

## (undated) DEVICE — CLAMP EXT FIX L TI ALLY COMB CLP ON SELF HLD

## (undated) DEVICE — PIN FIX L225MM DIA6MM S STL FOR L EXT FIX SET